# Patient Record
Sex: MALE | Race: WHITE | URBAN - METROPOLITAN AREA
[De-identification: names, ages, dates, MRNs, and addresses within clinical notes are randomized per-mention and may not be internally consistent; named-entity substitution may affect disease eponyms.]

---

## 2017-04-14 ENCOUNTER — TELEPHONE (OUTPATIENT)
Dept: SLEEP MEDICINE | Age: 57
End: 2017-04-14

## 2017-04-14 ENCOUNTER — DOCUMENTATION ONLY (OUTPATIENT)
Dept: SLEEP MEDICINE | Age: 57
End: 2017-04-14

## 2017-04-14 DIAGNOSIS — G47.33 OSA (OBSTRUCTIVE SLEEP APNEA): Primary | ICD-10-CM

## 2017-04-14 NOTE — TELEPHONE ENCOUNTER
Patient called dme advised he will need a repair/replace order to get a part for his cpap, please advise.  Thank you, Lindsay

## 2017-04-17 ENCOUNTER — DOCUMENTATION ONLY (OUTPATIENT)
Dept: SLEEP MEDICINE | Age: 57
End: 2017-04-17

## 2017-05-22 ENCOUNTER — DOCUMENTATION ONLY (OUTPATIENT)
Dept: SLEEP MEDICINE | Age: 57
End: 2017-05-22

## 2017-05-22 DIAGNOSIS — G47.33 OSA (OBSTRUCTIVE SLEEP APNEA): Primary | ICD-10-CM

## 2017-05-23 ENCOUNTER — DOCUMENTATION ONLY (OUTPATIENT)
Dept: SLEEP MEDICINE | Age: 57
End: 2017-05-23

## 2017-06-27 ENCOUNTER — DOCUMENTATION ONLY (OUTPATIENT)
Dept: SLEEP MEDICINE | Age: 57
End: 2017-06-27

## 2017-06-27 DIAGNOSIS — G47.33 OSA (OBSTRUCTIVE SLEEP APNEA): Primary | ICD-10-CM

## 2017-07-28 ENCOUNTER — DOCUMENTATION ONLY (OUTPATIENT)
Dept: SLEEP MEDICINE | Age: 57
End: 2017-07-28

## 2017-07-28 DIAGNOSIS — G47.33 OSA (OBSTRUCTIVE SLEEP APNEA): Primary | ICD-10-CM

## 2017-10-01 ENCOUNTER — HOSPITAL ENCOUNTER (INPATIENT)
Age: 57
LOS: 2 days | Discharge: HOME | DRG: 247 | End: 2017-10-03
Attending: EMERGENCY MEDICINE | Admitting: SPECIALIST
Payer: COMMERCIAL

## 2017-10-01 ENCOUNTER — APPOINTMENT (OUTPATIENT)
Dept: GENERAL RADIOLOGY | Age: 57
DRG: 247 | End: 2017-10-01
Attending: EMERGENCY MEDICINE
Payer: COMMERCIAL

## 2017-10-01 DIAGNOSIS — I21.19 ACUTE MYOCARDIAL INFARCTION OF INFERIOR WALL, INITIAL EPISODE OF CARE (HCC): Primary | ICD-10-CM

## 2017-10-01 PROBLEM — I21.3 STEMI (ST ELEVATION MYOCARDIAL INFARCTION) (HCC): Status: ACTIVE | Noted: 2017-10-01

## 2017-10-01 LAB
ANION GAP BLD CALC-SCNC: 17 MMOL/L (ref 5–15)
APTT PPP: 28 SEC (ref 22.1–32.5)
BASOPHILS # BLD: 0 K/UL (ref 0–0.1)
BASOPHILS NFR BLD: 0 % (ref 0–1)
BUN BLD-MCNC: 17 MG/DL (ref 9–20)
CA-I BLD-MCNC: 1.18 MMOL/L (ref 1.12–1.32)
CHLORIDE BLD-SCNC: 103 MMOL/L (ref 98–107)
CO2 BLD-SCNC: 27 MMOL/L (ref 21–32)
CREAT BLD-MCNC: 1.4 MG/DL (ref 0.6–1.3)
EOSINOPHIL # BLD: 0.3 K/UL (ref 0–0.4)
EOSINOPHIL NFR BLD: 3 % (ref 0–7)
ERYTHROCYTE [DISTWIDTH] IN BLOOD BY AUTOMATED COUNT: 13.1 % (ref 11.5–14.5)
GLUCOSE BLD-MCNC: 118 MG/DL (ref 65–100)
HCT VFR BLD AUTO: 38.2 % (ref 36.6–50.3)
HCT VFR BLD CALC: 38 % (ref 36.6–50.3)
HGB BLD-MCNC: 12.6 G/DL (ref 12.1–17)
HGB BLD-MCNC: 12.9 GM/DL (ref 12.1–17)
INR PPP: 0.9 (ref 0.9–1.1)
LYMPHOCYTES # BLD: 2.1 K/UL (ref 0.8–3.5)
LYMPHOCYTES NFR BLD: 19 % (ref 12–49)
MCH RBC QN AUTO: 29.2 PG (ref 26–34)
MCHC RBC AUTO-ENTMCNC: 33 G/DL (ref 30–36.5)
MCV RBC AUTO: 88.6 FL (ref 80–99)
MONOCYTES # BLD: 1.1 K/UL (ref 0–1)
MONOCYTES NFR BLD: 10 % (ref 5–13)
NEUTS SEG # BLD: 7.3 K/UL (ref 1.8–8)
NEUTS SEG NFR BLD: 68 % (ref 32–75)
PLATELET # BLD AUTO: 227 K/UL (ref 150–400)
POTASSIUM BLD-SCNC: 3.6 MMOL/L (ref 3.5–5.1)
PROTHROMBIN TIME: 9.5 SEC (ref 9–11.1)
RBC # BLD AUTO: 4.31 M/UL (ref 4.1–5.7)
SERVICE CMNT-IMP: ABNORMAL
SODIUM BLD-SCNC: 142 MMOL/L (ref 136–145)
THERAPEUTIC RANGE,PTTT: NORMAL SECS (ref 58–77)
TROPONIN I BLD-MCNC: 6.13 NG/ML (ref 0–0.08)
WBC # BLD AUTO: 10.8 K/UL (ref 4.1–11.1)

## 2017-10-01 PROCEDURE — 74011250636 HC RX REV CODE- 250/636: Performed by: EMERGENCY MEDICINE

## 2017-10-01 PROCEDURE — 85610 PROTHROMBIN TIME: CPT | Performed by: EMERGENCY MEDICINE

## 2017-10-01 PROCEDURE — 65610000006 HC RM INTENSIVE CARE

## 2017-10-01 PROCEDURE — 77030018729 HC ELECTRD DEFIB PAD CARD -B

## 2017-10-01 PROCEDURE — 96375 TX/PRO/DX INJ NEW DRUG ADDON: CPT

## 2017-10-01 PROCEDURE — C1725 CATH, TRANSLUMIN NON-LASER: HCPCS

## 2017-10-01 PROCEDURE — 74011250636 HC RX REV CODE- 250/636: Performed by: SPECIALIST

## 2017-10-01 PROCEDURE — 93454 CORONARY ARTERY ANGIO S&I: CPT

## 2017-10-01 PROCEDURE — 93005 ELECTROCARDIOGRAM TRACING: CPT

## 2017-10-01 PROCEDURE — 85730 THROMBOPLASTIN TIME PARTIAL: CPT | Performed by: EMERGENCY MEDICINE

## 2017-10-01 PROCEDURE — C1894 INTRO/SHEATH, NON-LASER: HCPCS

## 2017-10-01 PROCEDURE — C1769 GUIDE WIRE: HCPCS

## 2017-10-01 PROCEDURE — B2111ZZ FLUOROSCOPY OF MULTIPLE CORONARY ARTERIES USING LOW OSMOLAR CONTRAST: ICD-10-PCS | Performed by: SPECIALIST

## 2017-10-01 PROCEDURE — 77030008543 HC TBNG MON PRSS MRTM -A

## 2017-10-01 PROCEDURE — 96374 THER/PROPH/DIAG INJ IV PUSH: CPT

## 2017-10-01 PROCEDURE — 74011250637 HC RX REV CODE- 250/637: Performed by: SPECIALIST

## 2017-10-01 PROCEDURE — 77030013797 HC KT TRNSDUC PRSSR EDWD -A

## 2017-10-01 PROCEDURE — 80047 BASIC METABLC PNL IONIZED CA: CPT

## 2017-10-01 PROCEDURE — 77030004532 HC CATH ANGI DX IMP BSC -A

## 2017-10-01 PROCEDURE — C1887 CATHETER, GUIDING: HCPCS

## 2017-10-01 PROCEDURE — 84484 ASSAY OF TROPONIN QUANT: CPT

## 2017-10-01 PROCEDURE — 74011636320 HC RX REV CODE- 636/320: Performed by: SPECIALIST

## 2017-10-01 PROCEDURE — 99285 EMERGENCY DEPT VISIT HI MDM: CPT

## 2017-10-01 PROCEDURE — 74011000250 HC RX REV CODE- 250: Performed by: SPECIALIST

## 2017-10-01 PROCEDURE — 96361 HYDRATE IV INFUSION ADD-ON: CPT

## 2017-10-01 PROCEDURE — 94762 N-INVAS EAR/PLS OXIMTRY CONT: CPT

## 2017-10-01 PROCEDURE — 74011250637 HC RX REV CODE- 250/637: Performed by: EMERGENCY MEDICINE

## 2017-10-01 PROCEDURE — 85025 COMPLETE CBC W/AUTO DIFF WBC: CPT | Performed by: EMERGENCY MEDICINE

## 2017-10-01 PROCEDURE — 77030000299 HC FEMSTP COMP GLD STJU -B

## 2017-10-01 PROCEDURE — 36415 COLL VENOUS BLD VENIPUNCTURE: CPT | Performed by: EMERGENCY MEDICINE

## 2017-10-01 PROCEDURE — 74011000258 HC RX REV CODE- 258: Performed by: SPECIALIST

## 2017-10-01 PROCEDURE — 74011000250 HC RX REV CODE- 250: Performed by: EMERGENCY MEDICINE

## 2017-10-01 PROCEDURE — 74011250637 HC RX REV CODE- 250/637: Performed by: INTERNAL MEDICINE

## 2017-10-01 PROCEDURE — 027035Z DILATION OF CORONARY ARTERY, ONE ARTERY WITH TWO DRUG-ELUTING INTRALUMINAL DEVICES, PERCUTANEOUS APPROACH: ICD-10-PCS | Performed by: SPECIALIST

## 2017-10-01 PROCEDURE — C1874 STENT, COATED/COV W/DEL SYS: HCPCS

## 2017-10-01 RX ORDER — GUAIFENESIN 100 MG/5ML
324 LIQUID (ML) ORAL
Status: COMPLETED | OUTPATIENT
Start: 2017-10-01 | End: 2017-10-01

## 2017-10-01 RX ORDER — SODIUM CHLORIDE 0.9 % (FLUSH) 0.9 %
5-10 SYRINGE (ML) INJECTION AS NEEDED
Status: DISCONTINUED | OUTPATIENT
Start: 2017-10-01 | End: 2017-10-03 | Stop reason: HOSPADM

## 2017-10-01 RX ORDER — HEPARIN SODIUM 5000 [USP'U]/ML
5000 INJECTION, SOLUTION INTRAVENOUS; SUBCUTANEOUS
Status: COMPLETED | OUTPATIENT
Start: 2017-10-01 | End: 2017-10-01

## 2017-10-01 RX ORDER — GUAIFENESIN 100 MG/5ML
81 LIQUID (ML) ORAL DAILY
Status: DISCONTINUED | OUTPATIENT
Start: 2017-10-02 | End: 2017-10-01 | Stop reason: HOSPADM

## 2017-10-01 RX ORDER — ATROPINE SULFATE 0.1 MG/ML
.4-1 INJECTION INTRAVENOUS
Status: DISCONTINUED | OUTPATIENT
Start: 2017-10-01 | End: 2017-10-03 | Stop reason: HOSPADM

## 2017-10-01 RX ORDER — CLOPIDOGREL BISULFATE 75 MG/1
600 TABLET ORAL ONCE
Status: COMPLETED | OUTPATIENT
Start: 2017-10-01 | End: 2017-10-01

## 2017-10-01 RX ORDER — ATORVASTATIN CALCIUM 20 MG/1
80 TABLET, FILM COATED ORAL
Status: DISCONTINUED | OUTPATIENT
Start: 2017-10-01 | End: 2017-10-03 | Stop reason: HOSPADM

## 2017-10-01 RX ORDER — FENTANYL CITRATE 50 UG/ML
25-200 INJECTION, SOLUTION INTRAMUSCULAR; INTRAVENOUS
Status: DISCONTINUED | OUTPATIENT
Start: 2017-10-01 | End: 2017-10-01 | Stop reason: HOSPADM

## 2017-10-01 RX ORDER — ASPIRIN 325 MG
325 TABLET ORAL
Status: DISCONTINUED | OUTPATIENT
Start: 2017-10-01 | End: 2017-10-01

## 2017-10-01 RX ORDER — CLOPIDOGREL BISULFATE 75 MG/1
75 TABLET ORAL DAILY
Status: DISCONTINUED | OUTPATIENT
Start: 2017-10-02 | End: 2017-10-03 | Stop reason: HOSPADM

## 2017-10-01 RX ORDER — LIDOCAINE HYDROCHLORIDE 10 MG/ML
1-20 INJECTION INFILTRATION; PERINEURAL
Status: DISCONTINUED | OUTPATIENT
Start: 2017-10-01 | End: 2017-10-01 | Stop reason: HOSPADM

## 2017-10-01 RX ORDER — ATORVASTATIN CALCIUM 20 MG/1
20 TABLET, FILM COATED ORAL
Status: DISCONTINUED | OUTPATIENT
Start: 2017-10-01 | End: 2017-10-01

## 2017-10-01 RX ORDER — SODIUM CHLORIDE 0.9 % (FLUSH) 0.9 %
5-10 SYRINGE (ML) INJECTION EVERY 8 HOURS
Status: DISCONTINUED | OUTPATIENT
Start: 2017-10-01 | End: 2017-10-03 | Stop reason: HOSPADM

## 2017-10-01 RX ORDER — MIDAZOLAM HYDROCHLORIDE 1 MG/ML
.5-1 INJECTION, SOLUTION INTRAMUSCULAR; INTRAVENOUS
Status: DISCONTINUED | OUTPATIENT
Start: 2017-10-01 | End: 2017-10-01 | Stop reason: HOSPADM

## 2017-10-01 RX ORDER — METOPROLOL TARTRATE 5 MG/5ML
5 INJECTION INTRAVENOUS
Status: COMPLETED | OUTPATIENT
Start: 2017-10-01 | End: 2017-10-01

## 2017-10-01 RX ORDER — GUAIFENESIN 100 MG/5ML
81 LIQUID (ML) ORAL DAILY
Status: DISCONTINUED | OUTPATIENT
Start: 2017-10-02 | End: 2017-10-03 | Stop reason: HOSPADM

## 2017-10-01 RX ORDER — HEPARIN SODIUM 200 [USP'U]/100ML
500 INJECTION, SOLUTION INTRAVENOUS
Status: DISCONTINUED | OUTPATIENT
Start: 2017-10-01 | End: 2017-10-01 | Stop reason: HOSPADM

## 2017-10-01 RX ORDER — METOPROLOL TARTRATE 25 MG/1
25 TABLET, FILM COATED ORAL EVERY 6 HOURS
Status: DISCONTINUED | OUTPATIENT
Start: 2017-10-02 | End: 2017-10-02

## 2017-10-01 RX ORDER — NITROGLYCERIN 0.4 MG/1
0.4 TABLET SUBLINGUAL
Status: DISPENSED | OUTPATIENT
Start: 2017-10-01 | End: 2017-10-01

## 2017-10-01 RX ORDER — ATORVASTATIN CALCIUM 20 MG/1
80 TABLET, FILM COATED ORAL
Status: DISCONTINUED | OUTPATIENT
Start: 2017-10-02 | End: 2017-10-01

## 2017-10-01 RX ADMIN — ATORVASTATIN CALCIUM 80 MG: 20 TABLET, FILM COATED ORAL at 22:39

## 2017-10-01 RX ADMIN — MIDAZOLAM HYDROCHLORIDE 3 MG: 1 INJECTION, SOLUTION INTRAMUSCULAR; INTRAVENOUS at 19:50

## 2017-10-01 RX ADMIN — HEPARIN SODIUM IN SODIUM CHLORIDE 1000 UNITS: 200 INJECTION INTRAVENOUS at 19:50

## 2017-10-01 RX ADMIN — FENTANYL CITRATE 25 MCG: 50 INJECTION, SOLUTION INTRAMUSCULAR; INTRAVENOUS at 20:38

## 2017-10-01 RX ADMIN — IOPAMIDOL 50 ML: 755 INJECTION, SOLUTION INTRAVENOUS at 20:10

## 2017-10-01 RX ADMIN — CLOPIDOGREL 600 MG: 75 TABLET, FILM COATED ORAL at 20:38

## 2017-10-01 RX ADMIN — METOPROLOL TARTRATE 5 MG: 5 INJECTION INTRAVENOUS at 19:06

## 2017-10-01 RX ADMIN — LIDOCAINE HYDROCHLORIDE 20 ML: 10 INJECTION, SOLUTION INFILTRATION; PERINEURAL at 19:49

## 2017-10-01 RX ADMIN — NITROGLYCERIN 0.4 MG: 0.4 TABLET SUBLINGUAL at 18:58

## 2017-10-01 RX ADMIN — BIVALIRUDIN 1.75 MG/KG/HR: 250 INJECTION, POWDER, LYOPHILIZED, FOR SOLUTION INTRAVENOUS at 20:30

## 2017-10-01 RX ADMIN — MIDAZOLAM HYDROCHLORIDE 1 MG: 1 INJECTION, SOLUTION INTRAMUSCULAR; INTRAVENOUS at 20:12

## 2017-10-01 RX ADMIN — SODIUM CHLORIDE 1000 ML: 900 INJECTION, SOLUTION INTRAVENOUS at 18:55

## 2017-10-01 RX ADMIN — MIDAZOLAM HYDROCHLORIDE 1 MG: 1 INJECTION, SOLUTION INTRAMUSCULAR; INTRAVENOUS at 20:38

## 2017-10-01 RX ADMIN — ASPIRIN 81 MG CHEWABLE TABLET 324 MG: 81 TABLET CHEWABLE at 19:05

## 2017-10-01 RX ADMIN — FENTANYL CITRATE 50 MCG: 50 INJECTION, SOLUTION INTRAMUSCULAR; INTRAVENOUS at 19:50

## 2017-10-01 RX ADMIN — HEPARIN SODIUM 5000 UNITS: 5000 INJECTION, SOLUTION INTRAVENOUS; SUBCUTANEOUS at 19:12

## 2017-10-01 RX ADMIN — FENTANYL CITRATE 50 MCG: 50 INJECTION, SOLUTION INTRAMUSCULAR; INTRAVENOUS at 19:53

## 2017-10-01 RX ADMIN — MIDAZOLAM HYDROCHLORIDE 1 MG: 1 INJECTION, SOLUTION INTRAMUSCULAR; INTRAVENOUS at 19:59

## 2017-10-01 RX ADMIN — BIVALIRUDIN 1.75 MG/KG/HR: 250 INJECTION, POWDER, LYOPHILIZED, FOR SOLUTION INTRAVENOUS at 20:07

## 2017-10-01 RX ADMIN — IOPAMIDOL 220 ML: 755 INJECTION, SOLUTION INTRAVENOUS at 20:47

## 2017-10-01 RX ADMIN — MIDAZOLAM HYDROCHLORIDE 1 MG: 1 INJECTION, SOLUTION INTRAMUSCULAR; INTRAVENOUS at 19:53

## 2017-10-01 NOTE — H&P
Date of  Admission: 10/1/2017  6:50 PM     Glenys Scott is a 65 y.o. male admitted for STEMI (ST elevation myocardial infarction) ([facility]  Subjective: pt with stuttering chest and epigastric pain for couple days, worse this afternoon while walking. Came to er where ekg shows acute inferior mi. Untreated htn. No known cardiac disease    denies dyspnea, palpitations, syncope, orthopnea, paroxysmal nocturnal dyspnea, exertional chest pressure/discomfort, claudication, lower extremity edema. Cardiac risk factors: male gender, hypertension. Assessment/Plan: currently hemodynamically stable. Has received iv heparin and aspirin, iv metoprolol. Recommend urgent cath and pci if necessary. Procedure, risks, benefits, alternatives and potential outcomes explained to patient and questions answered. No contraindication to dapt    Patient Active Problem List    Diagnosis Date Noted    STEMI (ST elevation myocardial infarction) ([facility] 10/01/2017      Mack Kidd MD  Past Medical History:   Diagnosis Date    HTN (hypertension)     Sleep apnea       History reviewed. No pertinent surgical history.   Allergies   Allergen Reactions    Ampicillin Other (comments)     \"spots throughout body\"        Family History   Problem Relation Age of Onset    Heart Disease Father      cabg in his 66's      Current Facility-Administered Medications   Medication Dose Route Frequency    sodium chloride (NS) flush 5-10 mL  5-10 mL IntraVENous PRN    sodium chloride 0.9 % bolus infusion 1,000 mL  1,000 mL IntraVENous CONTINUOUS    lidocaine (XYLOCAINE) 10 mg/mL (1 %) injection 1-20 mL  1-20 mL SubCUTAneous Multiple    fentaNYL citrate (PF) injection  mcg   mcg IntraVENous Multiple    nitroglycerin 100 mcg/ml compounded injection  1-10 mL IntraCORONary Multiple    atropine injection 0.4-1 mg  0.4-1 mg IntraVENous Multiple    iopamidol (ISOVUE-370) 76 % injection 100-200 mL  100-200 mL IntraarTERial Multiple    iopamidol (ISOVUE-370) 76 % injection 50 mL  50 mL Other RAD ONCE    bivalirudin (ANGIOMAX) BOLUS 64.5 mg  0.75 mg/kg IntraVENous ONCE    Followed by   Micha Marlyn bivalirudin (ANGIOMAX) 250 mg in 0.9% sodium chloride (MBP/ADV) 50 mL  1.75 mg/kg/hr IntraVENous CONTINUOUS    midazolam (VERSED) injection 0.5-10 mg  0.5-10 mg IntraVENous Multiple    heparinized saline 2 units/mL infusion 1,000 Units  500 mL IntraSHEAth Multiple    heparinized saline 2 units/mL infusion 1,000 Units  500 mL IntraarTERial Multiple         Review of Symptoms:  A comprehensive review of systems was negative except for that written in the HPI. Physical Exam    Visit Vitals    /89    Pulse 93    Temp 99.4 °F (37.4 °C)    Resp 13    Ht 5' 10\" (1.778 m)    Wt 190 lb (86.2 kg)    SpO2 98%    BMI 27.26 kg/m2     Neck: supple, symmetrical, trachea midline, no adenopathy, no carotid bruit and no JVD  Heart: regular rate and rhythm, S1, S2 normal, no murmur, click, rub or gallop  Lungs: clear to auscultation bilaterally  Abdomen: soft, non-tender.  Bowel sounds normal. No masses,  no organomegaly  Extremities: extremities normal, atraumatic, no cyanosis or edema  Pulses: 2+ and symmetric  Neurologic: Grossly normal    Cardiographics    Telemetry: normal sinus rhythm  ECG: normal sinus rhythm, acute inf mi  Echocardiogram: Not done    Recent radiology, intake/output and wt reviewed    Labs:   Recent Results (from the past 24 hour(s))   EKG, 12 LEAD, INITIAL    Collection Time: 10/01/17  6:45 PM   Result Value Ref Range    Ventricular Rate 96 BPM    Atrial Rate 96 BPM    P-R Interval 150 ms    QRS Duration 96 ms    Q-T Interval 358 ms    QTC Calculation (Bezet) 452 ms    Calculated P Axis 62 degrees    Calculated R Axis 27 degrees    Calculated T Axis 94 degrees    Diagnosis       Normal sinus rhythm  Inferior infarct , possibly acute  Marked ST abnormality, possible anterolateral subendocardial injury  ** ** ACUTE MI / STEMI ** **  Consider right ventricular involvement in acute inferior infarct  Abnormal ECG  No previous ECGs available     POC TROPONIN-I    Collection Time: 10/01/17  6:58 PM   Result Value Ref Range    Troponin-I (POC) 6.13 (H) 0.00 - 0.08 ng/mL   CBC WITH AUTOMATED DIFF    Collection Time: 10/01/17  6:59 PM   Result Value Ref Range    WBC 10.8 4.1 - 11.1 K/uL    RBC 4.31 4.10 - 5.70 M/uL    HGB 12.6 12.1 - 17.0 g/dL    HCT 38.2 36.6 - 50.3 %    MCV 88.6 80.0 - 99.0 FL    MCH 29.2 26.0 - 34.0 PG    MCHC 33.0 30.0 - 36.5 g/dL    RDW 13.1 11.5 - 14.5 %    PLATELET 079 015 - 507 K/uL    NEUTROPHILS 68 32 - 75 %    LYMPHOCYTES 19 12 - 49 %    MONOCYTES 10 5 - 13 %    EOSINOPHILS 3 0 - 7 %    BASOPHILS 0 0 - 1 %    ABS. NEUTROPHILS 7.3 1.8 - 8.0 K/UL    ABS. LYMPHOCYTES 2.1 0.8 - 3.5 K/UL    ABS. MONOCYTES 1.1 (H) 0.0 - 1.0 K/UL    ABS. EOSINOPHILS 0.3 0.0 - 0.4 K/UL    ABS. BASOPHILS 0.0 0.0 - 0.1 K/UL   PTT    Collection Time: 10/01/17  6:59 PM   Result Value Ref Range    aPTT 28.0 22.1 - 32.5 sec    aPTT, therapeutic range     58.0 - 77.0 SECS   PROTHROMBIN TIME + INR    Collection Time: 10/01/17  6:59 PM   Result Value Ref Range    INR 0.9 0.9 - 1.1      Prothrombin time 9.5 9.0 - 11.1 sec   POC CHEM8    Collection Time: 10/01/17  6:59 PM   Result Value Ref Range    Calcium, ionized (POC) 1.18 1.12 - 1.32 MMOL/L    Sodium (POC) 142 136 - 145 MMOL/L    Potassium (POC) 3.6 3.5 - 5.1 MMOL/L    Chloride (POC) 103 98 - 107 MMOL/L    CO2 (POC) 27 21 - 32 MMOL/L    Anion gap (POC) 17 (H) 5 - 15 mmol/L    Glucose (POC) 118 (H) 65 - 100 MG/DL    BUN (POC) 17 9 - 20 MG/DL    Creatinine (POC) 1.4 (H) 0.6 - 1.3 MG/DL    GFRAA, POC >60 >60 ml/min/1.73m2    GFRNA, POC 52 (L) >60 ml/min/1.73m2    Hemoglobin (POC) 12.9 12.1 - 17.0 GM/DL    Hematocrit (POC) 38 36.6 - 50.3 %    Comment Comment Not Indicated.

## 2017-10-01 NOTE — IP AVS SNAPSHOT
Ruchi Ho 
 
 
 [address] [facility] 
[phone] Patient: Aleksandra Rodriguez MRN: [id number] :1960 You are allergic to the following Allergen Reactions Ampicillin Other (comments) \"spots throughout body\" Recent Documentation Height Weight BMI Smoking Status 1.778 m 87.5 kg 27.68 kg/m2 Light Tobacco Smoker Emergency Contacts Name Discharge Info Relation Home Work Mobile Abby Miller  Spouse [3] [phone] About your hospitalization You were admitted on:  2017 You last received care in the:  [facility] 3 PROG CARE TELE 2 You were discharged on:  October 3, 2017 Unit phone number:  [phone] Why you were hospitalized Your primary diagnosis was:  Not on File Your diagnoses also included:  Stemi (St Elevation Myocardial Infarction) (Hcc), Coronary Artery Disease Involving Native Coronary Artery Providers Seen During Your Hospitalizations Provider Role Specialty Primary office phone Marlo Echeverria MD Attending Provider Emergency Medicine [phone] Ruben Contreras MD Attending Provider Cardiology [phone] Lili Canales MD Attending Provider Cardiology [phone] Your Primary Care Physician (PCP) Primary Care Physician Office Phone Office Fax Stefanie Galdamez [phone] Follow-up Information Follow up With Details Comments Contact Info Lili Canales MD On 10/6/2017 2 pm  [address] [place] [address] [facility] 
[phone] Lisa Marques MD   [address] [place] [address] [address] [place] [address] 
[phone] Your Appointments 2017  2:00 PM EDT  
ESTABLISHED PATIENT with Lili Canales MD  
[facility] ([facility]) [address] [place] [address] [facility]  
[phone] Current Discharge Medication List  
  
START taking these medications Dose & Instructions Dispensing Information Comments Morning Noon Evening Bedtime  
 aspirin 81 mg chewable tablet Your last dose was: Your next dose is:    
   
   
 Dose:  81 mg Take 1 Tab by mouth daily. Refills:  0  
     
   
   
   
  
 atorvastatin 80 mg tablet Commonly known as:  LIPITOR Your last dose was: Your next dose is:    
   
   
 Dose:  80 mg Take 1 Tab by mouth nightly. Quantity:  30 Tab Refills:  1  
     
   
   
   
  
 clopidogrel 75 mg Tab Commonly known as:  PLAVIX Your last dose was: Your next dose is:    
   
   
 Dose:  75 mg Take 1 Tab by mouth daily. Quantity:  30 Tab Refills:  11  
     
   
   
   
  
 isosorbide mononitrate ER 30 mg tablet Commonly known as:  IMDUR Your last dose was: Your next dose is:    
   
   
 Dose:  30 mg Take 1 Tab by mouth daily. Quantity:  30 Tab Refills:  3  
     
   
   
   
  
 lisinopril 5 mg tablet Commonly known as:  Ancelmo Lights Your last dose was: Your next dose is:    
   
   
 Dose:  5 mg Take 1 Tab by mouth daily. Quantity:  30 Tab Refills:  3  
     
   
   
   
  
 metoprolol tartrate 25 mg tablet Commonly known as:  LOPRESSOR Your last dose was: Your next dose is:    
   
   
 Dose:  12.5 mg Take 0.5 Tabs by mouth two (2) times a day. Quantity:  60 Tab Refills:  3 Where to Get Your Medications These medications were sent to [address]  [address], [place] [address] Phone:  [phone]  
  atorvastatin 80 mg tablet  
 clopidogrel 75 mg Tab  
 isosorbide mononitrate ER 30 mg tablet  
 lisinopril 5 mg tablet  
 metoprolol tartrate 25 mg tablet Discharge Instructions [facility] Office: [address] [facility] [place], [address] [facility] [address] [facility] [facility] 
    [phone] [facility] office: [facility]  
                           [phone] Patient Discharge Instructions Balod Hinson / [id number] : 1960 Admitted 10/1/2017 Discharged: 10/3/2017 Cardiologist:  Dr Henok Day     PCP:  Satish Faulkner MD 
 
Diagnosis:  
Patient Active Problem List  
Diagnosis Code  STEMI (ST elevation myocardial infarction) (Former[facility]) I21.3  Coronary artery disease involving native coronary artery I25.10 Procedures/Test: ECHO: Pump function (LVEF) normal 55-60% CATH: Drug stent placed in RCA · It is important that you take the medication exactly as they are prescribed. · Keep your medication in the bottles provided by the pharmacist and keep a list of the medication names, dosages, and times to be taken in your wallet. · Do not take other medications without consulting your doctor. BRING ALL of YOUR MEDICINES TO YOUR OFFICE VISIT with Dr. Tatianna Johnson 
 
Cardiac Catheterization  Discharge Instructions *Check the puncture site frequently for swelling or bleeding. If you see any bleeding, lie down and apply pressure over the area with a clean town or washcloth. Notify your doctor for any redness, swelling, drainage or oozing from the puncture site. Notify your doctor for any fever or chills. *If the leg or arm with the puncture becomes cold, numb or painful, call Dr Tatianna Johnson [phone] *Activity should be limited for the next 48 hours. Climb stairs as little as possible and avoid any stooping, bending or strenuous activity for 48 hours. No strenuous activity or heavy lifting over 10 lbs. for 7 days. Do not return to work until you see Dr Tatianna Johnson in the office. · You may take a shower 24 hours after your cardiac catheterization.   Be sure to get the dressing wet and then remove it; gently wash the area with warm soapy water. Pat dry and leave open to air. To help prevent infections, be sure to keep the cath site clean and dry. No lotions, creams, powders, ointments, etc. in the cath site for approximately 1 week. · Do not take a tub bath, get in a hot tub or swimming pool for approximately 5 days or until the cath site is completely healed. · Drink plenty of fluids for 24-48 hours after your cath to flush the contrast dye from your kidneys. No alcoholic beverages for 24 hours. You may resume your previous diet (low fat, low cholesterol) after your cath. · Do not drive or operate heavy machinery for 48 hours. · You may resume your usual diet. Drink more fluids than usual. 
· Have a responsible person drive you home and stay with you for at least 24 hours after your heart catheterization/angiography. · *After your cath, some bruising or discomfort is common during the healing process. Tylenol, 1-2 tablets every 6 hours as needed, is recommended if you experience any discomfort. If you experience any signs or symptoms of infection such as fever, chills, or poorly healing incision, persistent tenderness or swelling in the groin, redness and/or warmth to the touch, numbness, significant tingling or pain at the groin site or affected extremity, rash, drainage from the cath site, or if the leg feels tight or swollen, call your physician right away. ? If bleeding at the cath site occurs, take a clean gauze pad and apply direct pressure to the groin just above the puncture site. Call 911 immediately, and continue to apply direct pressure until an ambulance gets to your location. Diet: American Heart association, low fat, 1500 mg sodium. Lifestyle changes Do not smoke: go to : PrimeLetters.ca. aspx for text reminders Consider a Vegan diet- read Reversing and Preventing Heart Disease by Dr. J Carlos Saucedo. Watch [place] over NIKE a heart-healthy diet that is low in cholesterol, saturated fat, and salt, and is full of fruits, vegetables and whole-grains. Eat at least two servings of fish each week. You may get more details about how to eat healthy, but these tips can help you get started. Www.aha.com When should you call for help? Call 911 anytime you think you may need emergency care. For example, call if: 
You have signs of a heart attack. These may include: 
Chest pain or pressure. Sweating. Shortness of breath. Nausea or vomiting. Pain that spreads from the chest to the neck, jaw, or one or both shoulders or arms. Dizziness or lightheadedness. A fast or irregular pulse. After calling 911, chew 1 adult-strength aspirin. Wait for an ambulance. Do not try to drive yourself. You passed out (lost consciousness). You feel like you are having another heart attack. Call your doctor now or seek immediate medical care if: 
You have had any chest pain, even if it has gone away. You have new or increased shortness of breath. You are dizzy or lightheaded, or you feel like you may faint. Watch closely for changes in your health, and be sure to contact your doctor if you have any problem Information obtained by : 
I understand that if any problems occur once I am at home I am to contact my physician. I understand and acknowledge receipt of the instructions indicated above. R.N.'s Signature                                                                  Date/Time Patient or Representative Signature Date/Time Discharge Instructions Attachments/References ASPIRIN: HEART ATTACK AND STROKE PREVENTION (ENGLISH) MEFS - CLOPIDOGREL (PLAVIX) - (BY MOUTH) (ENGLISH) MEFS - ATORVASTATIN (LIPITOR) - (BY MOUTH) (ENGLISH) MEFS - METOPROLOL (LOPRESSOR, TOPROL XL) - (BY MOUTH) (ENGLISH) MEFS - ISOSORBIDE MONONITRATE (IMDUR, IMDUR ER, ISMO) - (BY MOUTH) (ENGLISH) MEFS - LISINOPRIL (PRINIVIL, ZESTRIL) - (BY MOUTH) (ENGLISH) Discharge Orders None [facility] Announcement We are excited to announce that we are making your provider's discharge notes available to you in Lover.ly. You will see these notes when they are completed and signed by the physician that discharged you from your recent hospital stay. If you have any questions or concerns about any information you see in Lover.ly, please call the Health Information Department where you were seen or reach out to your Primary Care Provider for more information about your plan of care. Introducing [facility]! [facility] introduces Lover.ly patient portal. Now you can access parts of your medical record, email your doctor's office, and request medication refills online. 1. In your internet browser, go to [email or web address] 2. Click on the First Time User? Click Here link in the Sign In box. You will see the New Member Sign Up page. 3. Enter your Lover.ly Access Code exactly as it appears below. You will not need to use this code after youve completed the sign-up process. If you do not sign up before the expiration date, you must request a new code. · Lover.ly Access Code: [email or web address] Expires: 12/31/2017  2:10 PM 
 
4. Enter the last four digits of your Social Security Number (xxxx) and Date of Birth (mm/dd/yyyy) as indicated and click Submit. You will be taken to the next sign-up page. 5. Create a Lover.ly ID.  This will be your Lover.ly login ID and cannot be changed, so think of one that is secure and easy to remember. 6. Create a Merge Social password. You can change your password at any time. 7. Enter your Password Reset Question and Answer. This can be used at a later time if you forget your password. 8. Enter your e-mail address. You will receive e-mail notification when new information is available in [address] 9. Click Sign Up. You can now view and download portions of your medical record. 10. Click the Download Summary menu link to download a portable copy of your medical information. If you have questions, please visit the Frequently Asked Questions section of the Merge Social website. Remember, Merge Social is NOT to be used for urgent needs. For medical emergencies, dial 911. Now available from your iPhone and Android! General Information Please provide this summary of care documentation to your next provider. Patient Signature:  ____________________________________________________________ Date:  ____________________________________________________________  
  
Rashida Tyler Provider Signature:  ____________________________________________________________ Date:  ____________________________________________________________ More Information Taking Aspirin to Prevent Heart Attack and Stroke: Care Instructions Your Care Instructions Aspirin acts as a \"blood thinner. \" It prevents blood clots from forming. When taken during and after a heart attack, it can reduce your chance of dying. And it's used if you have a stent in your coronary artery. Also, aspirin helps certain people lower their risk of a heart attack or stroke. Be sure you know what dose of aspirin to take and how often to take it. Low-dose aspirin is typically 81 mg. But the dose for daily aspirin can range from 81 mg to 325 mg. Taking aspirin every day can cause bleeding.  It may not be safe if you have stomach ulcers. And it may not be safe if you have high blood pressure that is not controlled. If you take aspirin pills every day, do not take ones that have other ingredients such as caffeine or sodium. Before you start to take aspirin, tell your doctor all the medicines, vitamins, herbal products, and supplements you take. Follow-up care is a key part of your treatment and safety. Be sure to make and go to all appointments, and call your doctor if you are having problems. It's also a good idea to know your test results and keep a list of the medicines you take. How can you care for yourself at home? · Take aspirin with a full glass of water unless your doctor tells you not to. Do not lie down right after you take it. · If you have a stent in your coronary artery, take your aspirin as your heart doctor says to. If another doctor says to stop taking the aspirin for any reason, talk to your heart doctor before you stop. · Do not chew or crush the coated or sustained-release forms of aspirin. · Ask your doctor if you can drink alcohol while you take aspirin. And ask how much you can drink. Too much alcohol with aspirin can cause stomach bleeding. · Do not take aspirin if you are pregnant, unless your doctor says it is okay. · Keep all aspirin out of children's reach. · Throw aspirin away if it starts to smell like vinegar. · Do not take aspirin if you have gout or if you take prescription blood thinners, unless your doctor has told you to. · Do not take prescription or over-the-counter medicines, vitamins, herbal products, or supplements without talking to your doctor first. Mariya Salts the label before you take another over-the-counter medicine. Many contain aspirin. So they could cause you to take too much aspirin. · Talk with your doctor before you take a pain medicine. Ask which type of medicine you can take and how to take it safely with aspirin. · Tell your doctor or dentist before a surgery or procedure that you take aspirin. He or she will tell you if you should stop taking aspirin before your surgery or procedure. Make sure that you understand exactly what your doctor wants you to do. Where can you learn more? Go to http://neema-trena.info/. Enter H363 in the search box to learn more about \"Taking Aspirin to Prevent Heart Attack and Stroke: Care Instructions. \" Current as of: April 3, 2017 Content Version: 11.3 © 1996-5137 [employer]. Care instructions adapted under license by marshallindex (which disclaims liability or warranty for this information). If you have questions about a medical condition or this instruction, always ask your healthcare professional. [address] any warranty or liability for your use of this information. Clopidogrel (Plavix) - (By mouth) Why this medicine is used:  
Helps prevent stroke, heart attack, and other heart problems. Contact a nurse or doctor right away if you have: 
· Sudden or severe headache · Bloody vomit or vomit that looks like coffee grounds; bloody or black, tarry stools · Bleeding that does not stop or bruises that do not heal 
· Dark urine or pale stools, nausea, loss of appetite, stomach pain, · Yellow skin or eyes Common side effects: · Minor bleeding or bruising © 2017 [address] Information is for End User's use only and may not be sold, redistributed or otherwise used for commercial purposes. Atorvastatin (Lipitor) - (By mouth) Why this medicine is used:  
Treats high cholesterol and triglyceride levels. Reduces the risk of angina, stroke, heart attack, or certain heart and blood vessel problems. Contact a nurse or doctor right away if you have: · Severe headache, confusion, trouble speaking · Dark urine or pale stools · Yellow skin or eyes · Nausea, vomiting, loss of appetite, stomach pain · Muscle pain, tenderness, or weakness; unusual tiredness Common side effects: 
· Diarrhea · Joint pain © 2017 [address] Information is for End User's use only and may not be sold, redistributed or otherwise used for commercial purposes. Metoprolol (Lopressor, Toprol XL) - (By mouth) Why this medicine is used:  
Treats high blood pressure, chest pain, and heart failure. Contact a nurse or doctor right away if you have: · Lightheadedness, dizziness, fainting · Rapid weight gain; swelling in your hands, ankles, or feet Common side effects: · Mild dizziness · Tiredness © 2017 [address] Information is for End User's use only and may not be sold, redistributed or otherwise used for commercial purposes. Isosorbide Mononitrate (Imdur, Imdur ER, Ismo) - (By mouth) Why this medicine is used:  
Prevents angina. Contact a nurse or doctor right away if you have: · Severe or ongoing dizziness, lightheadedness, fainting · Slow heartbeat, new or increased chest pain Common side effects: · Mild dizziness, lightheadedness · Headache · Feeling of warmth, redness of the face, neck, arms, and upper chest 
© 2017 [address] Information is for End User's use only and may not be sold, redistributed or otherwise used for commercial purposes. Lisinopril (Prinivil, Zestril) - (By mouth) Why this medicine is used:  
Treats high blood pressure and heart failure. Contact a nurse or doctor right away if you have: · Blistering, peeling, red skin rash · Change in how much or how often you urinate · Confusion, weakness, uneven heartbeat, trouble breathing · Lightheadedness, dizziness, fainting · Numbness or tingling in your hands, feet, or lips Common side effects: · Dry cough © 2017 [address] Information is for End User's use only and may not be sold, redistributed or otherwise used for commercial purposes.

## 2017-10-01 NOTE — IP AVS SNAPSHOT
[address] [facility] 
 
 
 [address] 
[phone] Patient: Shawna Engle MRN: [id number] :1960 Current Discharge Medication List  
  
START taking these medications Dose & Instructions Dispensing Information Comments Morning Noon Evening Bedtime  
 aspirin 81 mg chewable tablet Your last dose was: Your next dose is:    
   
   
 Dose:  81 mg Take 1 Tab by mouth daily. Refills:  0  
     
   
   
   
  
 atorvastatin 80 mg tablet Commonly known as:  LIPITOR Your last dose was: Your next dose is:    
   
   
 Dose:  80 mg Take 1 Tab by mouth nightly. Quantity:  30 Tab Refills:  1  
     
   
   
   
  
 clopidogrel 75 mg Tab Commonly known as:  PLAVIX Your last dose was: Your next dose is:    
   
   
 Dose:  75 mg Take 1 Tab by mouth daily. Quantity:  30 Tab Refills:  11  
     
   
   
   
  
 isosorbide mononitrate ER 30 mg tablet Commonly known as:  IMDUR Your last dose was: Your next dose is:    
   
   
 Dose:  30 mg Take 1 Tab by mouth daily. Quantity:  30 Tab Refills:  3  
     
   
   
   
  
 lisinopril 5 mg tablet Commonly known as:  Margaret Cho Your last dose was: Your next dose is:    
   
   
 Dose:  5 mg Take 1 Tab by mouth daily. Quantity:  30 Tab Refills:  3  
     
   
   
   
  
 metoprolol tartrate 25 mg tablet Commonly known as:  LOPRESSOR Your last dose was: Your next dose is:    
   
   
 Dose:  12.5 mg Take 0.5 Tabs by mouth two (2) times a day. Quantity:  60 Tab Refills:  3 Where to Get Your Medications These medications were sent to [address], [place]  [address], [place] [address] Phone:  [phone]  
  atorvastatin 80 mg tablet  
 clopidogrel 75 mg Tab isosorbide mononitrate ER 30 mg tablet  
 lisinopril 5 mg tablet  
 metoprolol tartrate 25 mg tablet

## 2017-10-01 NOTE — ED PROVIDER NOTES
HPI Comments: Patient is a very pleasant 44-year-old white male who presents to the emergency department with a history of stuttering chest pain for the past 3 days. He states that he developed intermittent chest heaviness that is nonradiating and accompanied with mild shortness of breath. He denies nausea, diaphoresis, lightheadedness, syncope. He has no prior history of cardiac disease. He has hypertension which is controlled with diet and exercise. He is a nonsmoker. Today patient went for a walk and developed chest pain again. He presents with roughly 3 hours of chest pain which is currently 3/10 in severity. The history is provided by the patient. Past Medical History:   Diagnosis Date    HTN (hypertension)     Sleep apnea        History reviewed. No pertinent surgical history. History reviewed. No pertinent family history. Social History     Social History    Marital status:      Spouse name: N/A    Number of children: N/A    Years of education: N/A     Occupational History    Not on file. Social History Main Topics    Smoking status: Never Smoker    Smokeless tobacco: Not on file    Alcohol use No    Drug use: Not on file    Sexual activity: Not on file     Other Topics Concern    Not on file     Social History Narrative         ALLERGIES: Ampicillin    Review of Systems   Constitutional: Negative. Negative for appetite change, fever and unexpected weight change. HENT: Negative. Negative for ear pain, hearing loss, nosebleeds, rhinorrhea, sore throat and trouble swallowing. Respiratory: Positive for shortness of breath. Negative for cough and chest tightness. Cardiovascular: Positive for chest pain. Negative for palpitations. Gastrointestinal: Negative. Negative for abdominal distention, abdominal pain, blood in stool and vomiting. Endocrine: Negative. Genitourinary: Negative for dysuria and hematuria. Musculoskeletal: Negative.   Negative for back pain and myalgias. Skin: Negative. Negative for rash. Allergic/Immunologic: Negative. Neurological: Negative. Negative for dizziness, syncope, weakness and numbness. Hematological: Negative. Psychiatric/Behavioral: Negative. All other systems reviewed and are negative. Vitals:    10/01/17 1911 10/01/17 1912 10/01/17 1915 10/01/17 1918   BP: (!) 162/92  (!) 161/94    Pulse: 88 92 88 93   Resp: 15 12 16 13   Temp:       SpO2: 94% 95% 95% 98%   Weight:       Height:                Physical Exam   Constitutional: He is oriented to person, place, and time. He appears well-developed and well-nourished. He appears distressed. Slightly pale appearing, in mild acute distress. HENT:   Head: Normocephalic and atraumatic. Right Ear: External ear normal.   Left Ear: External ear normal.   Nose: Nose normal.   Mouth/Throat: Oropharynx is clear and moist.   Eyes: Conjunctivae and EOM are normal. Pupils are equal, round, and reactive to light. Neck: Normal range of motion. Neck supple. No JVD present. No thyromegaly present. Cardiovascular: Normal rate, regular rhythm, normal heart sounds and intact distal pulses. No murmur heard. Pulmonary/Chest: Effort normal and breath sounds normal. No respiratory distress. He has no wheezes. He has no rales. Abdominal: Soft. Bowel sounds are normal. He exhibits no distension. There is no tenderness. Musculoskeletal: Normal range of motion. He exhibits no edema. Neurological: He is alert and oriented to person, place, and time. No cranial nerve deficit. Skin: Skin is warm and dry. No rash noted. Psychiatric: He has a normal mood and affect. His behavior is normal. Thought content normal.   Vitals reviewed. MDM  Number of Diagnoses or Management Options  Acute myocardial infarction of inferior wall, initial episode of care [facility]):   Diagnosis management comments:   Upon arrival in the emergency department a code STEMI was called.  I discussed the case on the phone with Dr. Gilberto Chen, cardiologist on call. He is on his way to the hospital to take the patient to the cardiac Cath Lab for revascularization. Patient was hypertensive upon arrival to the emergency department, was given one sublingual nitroglycerin which diminished his pain to 1/10. He was given 3 baby aspirins to chew. He was given a dose of beta blockers. As well as a bolus of heparin. Point of care troponin is elevated. 7:25 PM  Dr. Yola Gil is at bedside along with the team from the cardiac catheter lab. Plan is to urgently go to the lab for revascularization procedure. Patient remains hemodynamically stable at this time and is understanding and in agreement with plan. Critical time spent on this patient independent of procedures 30 minutes. Amount and/or Complexity of Data Reviewed  Clinical lab tests: ordered and reviewed  Tests in the radiology section of CPT®: ordered and reviewed  Tests in the medicine section of CPT®: reviewed and ordered  Discuss the patient with other providers: yes  Independent visualization of images, tracings, or specimens: yes    Risk of Complications, Morbidity, and/or Mortality  Presenting problems: high  Diagnostic procedures: high  Management options: high    Patient Progress  Patient progress: stable    ED Course       Procedures    EKG interpretation: EKG was performed at 1845 in the emergency department. Rate is 96. It is a normal sinus rhythm with a normal axis. There is ST elevation in leads 3 and aVF with reciprocal changes laterally consistent with an acute inferior wall MI. There is no ectopy.

## 2017-10-01 NOTE — ED NOTES
Emergency Department Nursing Plan of Care       The Nursing Plan of Care is developed from the Nursing assessment and Emergency Department Attending provider initial evaluation. The plan of care may be reviewed in the ED Provider note.     The Plan of Care was developed with the following considerations:   Patient / Family readiness to learn indicated by:verbalized understanding  Persons(s) to be included in education: patient and family  Barriers to Learning/Limitations:No    Signed     Suzi Bright RN    10/1/2017   7:11 PM

## 2017-10-01 NOTE — PROGRESS NOTES
[facility]: MED RECONCILIATION    Comments/Recommendations:     Patient is awake and alert  Verifies allergies  The patient denies taking any medications prescription or OTC  The patient confirms he has high blood pressure but has been able to control this without medication    Allergies: Ampicillin    Prior to Admission Medications:     Medication Documentation Review Audit       Reviewed by Alberta Cole PHARMD (Pharmacist) on 10/01/17 at [address] [place] [address] Provider Last Dose Status Taking?                **No Medications to Display**                                Thank you,      Anne Bishop, PriyankaD, BCPS

## 2017-10-02 PROBLEM — I25.10 CORONARY ARTERY DISEASE INVOLVING NATIVE CORONARY ARTERY: Status: ACTIVE | Noted: 2017-10-02

## 2017-10-02 LAB
ANION GAP SERPL CALC-SCNC: 11 MMOL/L (ref 5–15)
ATRIAL RATE: 94 BPM
ATRIAL RATE: 96 BPM
BUN SERPL-MCNC: 16 MG/DL (ref 6–20)
BUN/CREAT SERPL: 12 (ref 12–20)
CALCIUM SERPL-MCNC: 8.5 MG/DL (ref 8.5–10.1)
CALCULATED P AXIS, ECG09: 60 DEGREES
CALCULATED P AXIS, ECG09: 62 DEGREES
CALCULATED R AXIS, ECG10: 27 DEGREES
CALCULATED R AXIS, ECG10: 29 DEGREES
CALCULATED T AXIS, ECG11: 100 DEGREES
CALCULATED T AXIS, ECG11: 94 DEGREES
CHLORIDE SERPL-SCNC: 106 MMOL/L (ref 97–108)
CHOLEST SERPL-MCNC: 151 MG/DL
CO2 SERPL-SCNC: 26 MMOL/L (ref 21–32)
CREAT SERPL-MCNC: 1.33 MG/DL (ref 0.7–1.3)
DIAGNOSIS, 93000: NORMAL
DIAGNOSIS, 93000: NORMAL
ERYTHROCYTE [DISTWIDTH] IN BLOOD BY AUTOMATED COUNT: 13.2 % (ref 11.5–14.5)
EST. AVERAGE GLUCOSE BLD GHB EST-MCNC: 123 MG/DL
GLUCOSE SERPL-MCNC: 127 MG/DL (ref 65–100)
HBA1C MFR BLD: 5.9 % (ref 4.2–6.3)
HCT VFR BLD AUTO: 35.3 % (ref 36.6–50.3)
HDLC SERPL-MCNC: 34 MG/DL
HDLC SERPL: 4.4 {RATIO} (ref 0–5)
HGB BLD-MCNC: 11.4 G/DL (ref 12.1–17)
LDLC SERPL CALC-MCNC: 85.4 MG/DL (ref 0–100)
LIPID PROFILE,FLP: ABNORMAL
MCH RBC QN AUTO: 28.9 PG (ref 26–34)
MCHC RBC AUTO-ENTMCNC: 32.3 G/DL (ref 30–36.5)
MCV RBC AUTO: 89.6 FL (ref 80–99)
P-R INTERVAL, ECG05: 150 MS
P-R INTERVAL, ECG05: 156 MS
PLATELET # BLD AUTO: 213 K/UL (ref 150–400)
POTASSIUM SERPL-SCNC: 4 MMOL/L (ref 3.5–5.1)
Q-T INTERVAL, ECG07: 358 MS
Q-T INTERVAL, ECG07: 370 MS
QRS DURATION, ECG06: 102 MS
QRS DURATION, ECG06: 96 MS
QTC CALCULATION (BEZET), ECG08: 452 MS
QTC CALCULATION (BEZET), ECG08: 462 MS
RBC # BLD AUTO: 3.94 M/UL (ref 4.1–5.7)
SODIUM SERPL-SCNC: 143 MMOL/L (ref 136–145)
TRIGL SERPL-MCNC: 158 MG/DL (ref ?–150)
TROPONIN I SERPL-MCNC: 32.22 NG/ML
TSH SERPL DL<=0.05 MIU/L-ACNC: 1.13 UIU/ML (ref 0.36–3.74)
VENTRICULAR RATE, ECG03: 94 BPM
VENTRICULAR RATE, ECG03: 96 BPM
VLDLC SERPL CALC-MCNC: 31.6 MG/DL
WBC # BLD AUTO: 9.4 K/UL (ref 4.1–11.1)

## 2017-10-02 PROCEDURE — 93306 TTE W/DOPPLER COMPLETE: CPT

## 2017-10-02 PROCEDURE — 65660000000 HC RM CCU STEPDOWN

## 2017-10-02 PROCEDURE — 84484 ASSAY OF TROPONIN QUANT: CPT | Performed by: SPECIALIST

## 2017-10-02 PROCEDURE — 77030027138 HC INCENT SPIROMETER -A

## 2017-10-02 PROCEDURE — 80048 BASIC METABOLIC PNL TOTAL CA: CPT | Performed by: SPECIALIST

## 2017-10-02 PROCEDURE — 74011250637 HC RX REV CODE- 250/637: Performed by: SPECIALIST

## 2017-10-02 PROCEDURE — 36415 COLL VENOUS BLD VENIPUNCTURE: CPT | Performed by: SPECIALIST

## 2017-10-02 PROCEDURE — 74011250637 HC RX REV CODE- 250/637: Performed by: INTERNAL MEDICINE

## 2017-10-02 PROCEDURE — 85027 COMPLETE CBC AUTOMATED: CPT | Performed by: SPECIALIST

## 2017-10-02 PROCEDURE — 84443 ASSAY THYROID STIM HORMONE: CPT | Performed by: SPECIALIST

## 2017-10-02 PROCEDURE — 80061 LIPID PANEL: CPT | Performed by: SPECIALIST

## 2017-10-02 PROCEDURE — 83036 HEMOGLOBIN GLYCOSYLATED A1C: CPT | Performed by: NURSE PRACTITIONER

## 2017-10-02 RX ORDER — METOPROLOL TARTRATE 25 MG/1
12.5 TABLET, FILM COATED ORAL 2 TIMES DAILY
Status: DISCONTINUED | OUTPATIENT
Start: 2017-10-02 | End: 2017-10-03 | Stop reason: HOSPADM

## 2017-10-02 RX ADMIN — METOPROLOL TARTRATE 12.5 MG: 25 TABLET ORAL at 17:29

## 2017-10-02 RX ADMIN — Medication 10 ML: at 22:19

## 2017-10-02 RX ADMIN — CLOPIDOGREL BISULFATE 75 MG: 75 TABLET ORAL at 09:02

## 2017-10-02 RX ADMIN — Medication 10 ML: at 22:20

## 2017-10-02 RX ADMIN — ATORVASTATIN CALCIUM 80 MG: 20 TABLET, FILM COATED ORAL at 22:18

## 2017-10-02 RX ADMIN — ASPIRIN 81 MG 81 MG: 81 TABLET ORAL at 09:02

## 2017-10-02 RX ADMIN — Medication 10 ML: at 17:30

## 2017-10-02 RX ADMIN — METOPROLOL TARTRATE 25 MG: 25 TABLET ORAL at 00:56

## 2017-10-02 NOTE — PROGRESS NOTES
TRANSFER - IN REPORT:    Verbal report received from [facility]) on Misael Nelson  being received from ICU(unit) for routine progression of care      Report consisted of patients Situation, Background, Assessment and   Recommendations(SBAR). Information from the following report(s) SBAR, Kardex, ED Summary, Procedure Summary, Intake/Output, MAR, Accordion, Recent Results, Med Rec Status and Cardiac Rhythm normal sinus rhythm was reviewed with the receiving nurse. Opportunity for questions and clarification was provided. Assessment completed upon patients arrival to unit and care assumed.

## 2017-10-02 NOTE — PROGRESS NOTES
Report received from [facility] in the cath lab. Per RN when patient arrived to floor, sheath  to be pulled 2 hours after angio max drip complete. Pt has sheath in right groin. No bleeding no hematoma. Angiomax still infusing on arrival to ICU. Pt is AAO. Pt family is at bedside. Pastoral Care and  to bedside. Dr. Antony Quinonez to bedside to update patient and family on situation and answer questions. Will continue to monitor. 0130 Pt sheath pulled. Pt achieved hemostasis at 0140. No bleeding no hematoma. Pt tolerated procedure. Instructed on keeping leg straight and lying flat for 2 more hours. 0630 Called troponin to Dr. Antony Quinonez. No new orders.

## 2017-10-02 NOTE — PROGRESS NOTES
NUTRITION education       Nutrition Assessment:   66 yo male admitted with STEMI, s/p cardiac cath. Visited pt this afternoon. Tolerating diet, appetite good. Labs/Meds reviewed. , Chol 151. Pt stated wife his meals and she is \"usually\" prepares healthy foods. Pt did admit to eating [address] cakes/cookies frequently. Discussed heart healthy dietary guidelines including food selection, portion control, & meal planning. Handouts provided for reference along with contact information for further questions/concerns. Encouraged pt to call if needed. Pt verbalized understanding of information discussed. Nutrition Diagnoses:  Nutrition/food-related knowledge deficit [facility] dietary guidelines AEB recent MI, eating high fat/calorie foods    Intervention:   1. Brief Nutrition education (E - 1.2) including identification of high calorie/fat/Na+ foods, reading food labels, and replacing with healthier choices    2. Nutrition Counseling (C-2) including strategies for behavior modification, goal setting, and planning    Monitoring/Evaluation: Pt expressed understanding of education topics and acknowledged ability in applying diet goals. Pt answered questions posed to demonstrate learning.      Pino Simmons RD

## 2017-10-02 NOTE — PROGRESS NOTES
0715 Bedside and Verbal shift change report given to Nguyen Serrano RN  (oncoming nurse) by Carmela Cowden, RN (offgoing nurse).  Report included the following information SBAR, MAR, Recent Results and Cardiac Rhythm SR.

## 2017-10-02 NOTE — PROGRESS NOTES
TRANSFER - OUT REPORT:    Verbal report given to Elmira Wooten RN(name) on Seniat Servin  being transferred to [facility](unit) for routine progression of care       Report consisted of patients Situation, Background, Assessment and   Recommendations(SBAR). Information from the following report(s) SBAR, MAR, Recent Results and Cardiac Rhythm NSR was reviewed with the receiving nurse. Lines:   Peripheral IV 10/01/17 Left Antecubital (Active)   Site Assessment Clean, dry, & intact 10/2/2017  8:00 AM   Phlebitis Assessment 0 10/2/2017  8:00 AM   Infiltration Assessment 0 10/2/2017  8:00 AM   Dressing Status Clean, dry, & intact 10/2/2017  8:00 AM   Dressing Type Transparent 10/2/2017  8:00 AM   Hub Color/Line Status Pink;Capped;Flushed 10/2/2017  8:00 AM   Alcohol Cap Used Yes 10/2/2017  8:00 AM       Peripheral IV 10/01/17 Right Antecubital (Active)   Site Assessment Clean, dry, & intact 10/2/2017  8:00 AM   Phlebitis Assessment 0 10/2/2017  8:00 AM   Infiltration Assessment 0 10/2/2017  8:00 AM   Dressing Status Clean, dry, & intact 10/2/2017  8:00 AM   Dressing Type Transparent 10/2/2017  8:00 AM   Hub Color/Line Status Pink;Flushed;Capped 10/2/2017  8:00 AM   Alcohol Cap Used Yes 10/2/2017  8:00 AM        Opportunity for questions and clarification was provided.       Patient transported with:   Monitor  Registered Nurse

## 2017-10-02 NOTE — PROGRESS NOTES
Follow up visit with . Dawit Velazquez and his wife in [facility]. They are very thankful for all of the care they have received here at [facility]. Provided continued spiritual presence and assurance of prayer.   Visited by: Juanjose Vogel [address] [facility] Terrie ([id number])

## 2017-10-02 NOTE — PROGRESS NOTES
Seen patient. Discussed with wife and daughters in the room. Many question regarding CAD, anatomy and future care. Answered all and provided reassurance. Hitesh Nolan MD, [facility]

## 2017-10-02 NOTE — PROGRESS NOTES
Spiritual Care Assessment/Progress Notes    Luana Torres [id number]  [id number]    1960  65 y.o.  male    Patient Telephone Number: [phone] (home)   Religion Affiliation: Lutheran   Language: English   Extended Emergency Contact Information  Primary Emergency Contact: Zeghabna  Home Phone: [phone]  Relation: Spouse   Patient Active Problem List    Diagnosis Date Noted    STEMI (ST elevation myocardial infarction) ([facility] [address] 10/01/2017        Date: 10/1/2017       Level of Religion/Spiritual Activity:  [x]         Involved in kiersten tradition/spiritual practice    []         Not involved in kiersten tradition/spiritual practice  [x]         Spiritually oriented    []         Claims no spiritual orientation    []         seeking spiritual identity  []         Feels alienated from Yazdanism practice/tradition  []         Feels angry about Yazdanism practice/tradition  [x]         Spirituality/Yazdanism tradition IS a resource for coping at this time.   []         Not able to assess due to medical condition    Services Provided Today:  []         crisis intervention    []         reading Scriptures  [x]         spiritual assessment    [x]         prayer  [x]         empathic listening/emotional support  []         rites and rituals (cite in comments)  [x]         life review     []         Yazdanism support  []         theological development   []         advocacy  []         ethical dialog     []         blessing  []         bereavement support    [x]         support to family  []         anticipatory grief support   []         help with AMD  []         spiritual guidance    []         meditation      Spiritual Care Needs  [x]         Emotional Support  [x]         Spiritual/Religion Care  []         Loss/Adjustment  []         Advocacy/Referral                /Ethics  []         No needs expressed at               this time  []         Other: (note in               comments)  [address]  [] Follow up visits with               pt/family  []         Provide materials  []         Schedule sacraments  []         Contact Community               Clergy  [x]         Follow up as needed  []         Other: (note in               comments)     Comments: Responded to request for  to the ER for Code Stemi. Mr. Toney Villalobos wife was present. He asked for a  as I walked in the room. He appeared to be comfortable with  presence. Escorted his wife Britany Finn to the waiting area on the 2nd floor and he received an emergent procedure. She contacted their three children, two daughters here in [place] and a son at [facility]. They have a strong Buddhist kiersten. Provided calming presence as she shared about their kiersten, life together, children and her concerns. Provided spiritual presence as the Doctor spoke with her after the procedure, escorted her to the ICU waiting area. Her daughters arrived. Touched base with Mr. Rima Benitez who was pleased thing went ok. Provided prayer and Advised of  Availability. Had his Buddhist changed from unknown to [address] with admissions department.     Visited by: Keagan Mcleod [address] [facility] Hossein Falcon ([id number])

## 2017-10-02 NOTE — PROGRESS NOTES
Problem: Patient Education: Go to Patient Education Activity  Goal: Patient/Family Education  Outcome: Laxmi Moore to patient bedside to discuss results of cath and answer questions that patient may have

## 2017-10-02 NOTE — PROGRESS NOTES
I met with patient discuss Case management role. I verified home address, phone. Pt states he lives in a two story home, with 4 entry steps with his wife. Pt drives, and lives with his wife Alan Canas [phone]. I confirmed pharmacy is [facility]   Phone: [phone] Fax: [phone]. Patient PCP is Mallorie De La Rosa. Feliciano Blum MD  Pt has never had home health, No DME. Care Management Interventions  PCP Verified by CM:  Yes  MyChart Signup: No  Discharge Durable Medical Equipment: No  Physical Therapy Consult: No  Occupational Therapy Consult: No  Speech Therapy Consult: No  Current Support Network: Lives with Spouse  Confirm Follow Up Transport: Family  Plan discussed with Pt/Family/Caregiver: Yes  Discharge Location  Discharge Placement: Home

## 2017-10-02 NOTE — PROCEDURES
Cardiac Catheterization Procedure Note   Patient: Misael Nelson  MRN: [id number]  SSN: [id number]   YOB: 1960 Age: 65 y.o. Sex: male    Date of Procedure: 10/1/2017   Pre-procedure Diagnosis: STEMI  Post-procedure Diagnosis: Coronary Artery Disease  Procedure: coronaries, riri times two to prox/mid rca  :  Dr. Riana Jensen MD    Assistant(s):  None  Anesthesia: Moderate Sedation   Estimated Blood Loss: Less than 10 mL   Specimens Removed: None  Findings: mod diffuse lm disease, mild to mod diffuse lad, 60% d1. 80% very prox om1/ramus, 60% mid om2, totally occluded early mid rca with left to right collateral, 70% mid pda. Total rca treated with overlapping 3 by 22 and 2 by 14mm riri to 12 duglas. Excellent result. Could not get wire into origin pda after multiple attempts. No lv gram because of contrast load and elevated creatinine.   Complications: None   Implants:  None  Signed by:  Riana Jensen MD  10/1/2017  8:58 PM

## 2017-10-02 NOTE — CARDIO/PULMONARY
Cardiac Wellness: 10/2/2017 @ 2493:  Received cardiac rehab consult for MI education. MI education folder with catheterization brochure to bedside of Quentin Garcia. Educated using teach back method. Reviewed MI diagnosis definition and purpose of intervention. Pt is aware of MI and stent placement to RCA. Provided stent card. Reviewed post cath instructions via right femoral site, with emphasis on temporary restrictions, signs/symptoms of infection, f/u with MD, what to do if bleeding occurs, and importance of taking all meds as prescribed. Identified pertinent CAD risk factors including HTN, age, male gender, job stress, JONH and encouraged lifestyle modifications. Reviewed importance of medication compliance and follow up appointments with cardiologist.  Discussed purpose of ASA, Plavix, Lipitor, metoprolol and potential side effects, signs and symptoms of angina and what to report to physician after discharge. Smoking history assessed. Patient is a former smoker from college days but has not smoked in many years. Emphasized value of cardiac rehab, discussed Cardiac Wellness Program and encouraged enrollment. Pt reported knowledge of importance in attending Cardiac Wellness but was concerned about time and job responsibilities conflicting. Quentin Garcia verbalized understanding with questions answered. Cardiac Rehab will follow.

## 2017-10-02 NOTE — PROGRESS NOTES
Cardiology Daily Progress Note      Saranya Mosquera is a 65 y.o. male admitted with CAD, STEMI, S/p RCA stent, residual PDA, LCX and LAD dz. Overnight had bradycardia with rate of 50. No VT or NSVT. Today am had to hold Lopressor due to hypotension and bradycardia. Echo pending. Trop peak 33.             Visit Vitals    /55 (BP 1 Location: Right arm, BP Patient Position: At rest)    Pulse 78    Temp 99 °F (37.2 °C)    Resp 21    Ht 5' 10\" (1.778 m)    Wt 202 lb (91.6 kg)    SpO2 94%    BMI 28.98 kg/m2       Intake/Output Summary (Last 24 hours) at 10/02/17 0820  Last data filed at 10/02/17 0352   Gross per 24 hour   Intake            68.45 ml   Output              800 ml   Net          -731.55 ml     General appearance - alert, well appearing, and in no distress  Mental status - affect appropriate to mood  Eyes - sclera anicteric, moist mucous membranes  Neck - supple, no significant adenopathy  Chest - clear to auscultation, no wheezes, rales or rhonchi  Heart - normal rate, regular rhythm, normal S1, S2, no murmurs, rubs, clicks or gallops  Abdomen - soft, nontender, nondistended, no masses or organomegaly  Extremities - peripheral pulses normal, no pedal edema    Current Facility-Administered Medications   Medication Dose Route Frequency    sodium chloride (NS) flush 5-10 mL  5-10 mL IntraVENous PRN    nitroglycerin 100 mcg/ml compounded injection  1-10 mL IntraCORONary Multiple    atropine injection 0.4-1 mg  0.4-1 mg IntraVENous Multiple    bivalirudin (ANGIOMAX) 250 mg in 0.9% sodium chloride (MBP/ADV) 50 mL  1.75 mg/kg/hr IntraVENous CONTINUOUS    sodium chloride (NS) flush 5-10 mL  5-10 mL IntraVENous Q8H    sodium chloride (NS) flush 5-10 mL  5-10 mL IntraVENous PRN    metoprolol tartrate (LOPRESSOR) tablet 25 mg  25 mg Oral Q6H    aspirin chewable tablet 81 mg  81 mg Oral DAILY    clopidogrel (PLAVIX) tablet 75 mg  75 mg Oral DAILY    sodium chloride (NS) flush 5-10 mL 5-10 mL IntraVENous Q8H    sodium chloride (NS) flush 5-10 mL  5-10 mL IntraVENous PRN    atorvastatin (LIPITOR) tablet 80 mg  80 mg Oral QHS        No specialty comments available. Assessment:    - CAD/ STEMI  - HTN  - Dyslipidemia      Plan:     - Continue ASA, Plavix and Stains. Resume Lorpessor at Lower dose of 12.5 BID. - High dose statins per ACS protocol for few days until seen as OP.   - Move out of ICU to  today. PT/ OT. Cardiac rehab consultation.   - Dr. Roopa Briceno to review the cath for PDA lesion.   - Possible DC home tomorrow or later today if all goes well.              ___________________________________________________    Judy Urias.  Quentin Chandler MD, [facility]

## 2017-10-02 NOTE — DISCHARGE SUMMARY
Cardiology Discharge Summary     Patient ID:       Quiana Singh  [id number]  1 y.o.  1960    Admit Date: 10/1/2017    Discharge Date: 10/3/2017     Admitting Physician: No admitting provider for patient encounter. PCP: Denver Dubon MD    Discharge Physician: Dr Dominga Tejada:                     Cardiac Rehab    Admission Diagnoses: STEMI (ST elevation myocardial infarction) [facility])    Discharge Diagnoses:   Patient Active Problem List   Diagnosis Code    STEMI (ST elevation myocardial infarction) ([facility] [address] I21.3    Coronary artery disease involving native coronary artery I25.10       Discharge Condition: Stable    Care Coordination: 60  minutes spent coordinating care, DC planning, arranging follow up, reviewing dx, medications, treatment plan, follow up plans. Cardiology Procedures this Admission:  Left heart catheterization with PCI  EchoCardiogram    Hospital Course:    Quiana Singh is a 66 yo male Hx HTN and JONH pw  stuttering chest and epigastric pain  for a couple of days, worse on the day of admit while walking. PW w/ acute IMI. EKG showed acute IMI. He was tx w/ IV heparin, ASA, IV metoprolol and taken urgently to the cath lab. Cardiac cath demonstrated  : LM: mod, LAD: mild-mod;D1: p60%, OM2:m60%: Ramus p80%, RCA: p100%; L-> R collaterals distally, PDA: m70%  ---- s/p ANNE (Resolute 3x22, 2x15) -> RCA    Dr Montserrat Inman and Dr Meño Rebollar both reviewed reviewed films for PDA lesion. Given that pt is post ANNE and now pain free decision to wait on further eval of additional  Intervention due to need for uninterrupted DAPT with ANNE. This was reviewed at length the pt and his spouse. He  was admitted to  ICU for further observation and medical therapy. He  was treated with DAPT, statin, and betablocker, nitrates, ACE-I. He has some transient bradycardia and hypotensionl so the BB was held and dose adjusted.           ECHO showed LVEF 55-60%      Refer patient to phase II outpatient cardiac rehab once seen in follow un in the office. LIPIDS:high dose statin    Lab Results   Component Value Date/Time    Cholesterol, total 151 10/02/2017 05:22 AM    HDL Cholesterol 34 10/02/2017 05:22 AM    LDL, calculated 85.4 10/02/2017 05:22 AM    Triglyceride 158 10/02/2017 05:22 AM    CHOL/HDL Ratio 4.4 10/02/2017 05:22 AM     Lab Results   Component Value Date/Time    INR 0.9 10/01/2017 06:59 PM    Prothrombin time 9.5 10/01/2017 06:59 PM    PLATELET 408 60/63/3727 05:22 AM         Accel HTN: BP in ED elevated 190 range. Treated with IV betablocker and po, plus topical nitrates. The patient was seen by cardiac rehab for education. Deconditioning: up ambulating independently. Case mangement was consulted for discharge planning. The patient/family was kept apprised of his disease process and treatment plan of care. After receiving maximum benefit from his in-patient hospitalization, he was ready for discharge. At the time of discharge he was up ambulating and hemodynamically stable. Discharge Exam:     Visit Vitals    /77 (BP 1 Location: Right arm, BP Patient Position: At rest)    Pulse 85    Temp 98.8 °F (37.1 °C)    Resp 18    Ht 5' 10\" (1.778 m)    Wt 192 lb 14.4 oz (87.5 kg)    SpO2 99%    BMI 27.68 kg/m2     See Final Progress Note    Disposition: home    Patient Instructions: see AVS       Referenced discharge instructions provided by nursing for diet and activity. Follow-up with Dr. Sofía Bullock up With Details Michelle Rodriguez MD On 10/6/2017 2 pm  [facility] [address] [place]   [address]  [phone]            Signed:    10/3/2017      Cardiology Attending:    Patient personally seen and examined. All the elements of history and examination were personally performed.  Assessment and plan was discussed and agree as written above. Discharge time >30min    Hitesh Chandler MD, [facility] [place]

## 2017-10-02 NOTE — CDMP QUERY
The diagnosis of untreated HTN has been documented for this patient. The following is also noted in the medical record:    * On arrival, /122, 196/117, 176/104. * Admitted for inferior STEMI  * Given Lopressor 5 mg IV, NTG 0.4 mg SL x 1, and Lopressor 25 mg po x 1  * Started on Lopressor 12.5 mg po BID    Based on your medical judgement, could your documentation be further specified as:     =>Hypertensive urgency  =>Hypertensive crisis  =>Hypertensive emergency  =>Other Explanation of clinical findings  =>Unable to Determine (no explanation of clinical findings)      Please clarify and document your clinical opinion in the progress notes and discharge summary including the definitive and/or presumptive diagnosis, (suspected or probable), related to the above clinical findings. Please include clinical findings supporting your diagnosis.      REFERENCE:  -----------------------------------------------  Hypertensive Urgency: SBP > 180 or DBP > 120 in the absence of progressive target organ dysfunction     Hypertensive Crisis: BP elevates rapidly and severely enough to potentially cause organ damage (e.g. severe HA, SOB, nosebleed, severe anxiety, nausea/vomiting, etc.)    Hypertensive Emergency: SBP >180 or DBP > 120 with associated organ dysfunction (e.g. CVA, LOC, memory loss, MI, JOSR, aortic dissection, angina, pulmonary edema, encephalopathy, retinopathy, HELLP, etc.)    Grace Padilla, [facility] [address]  [email or web address]

## 2017-10-02 NOTE — PROGRESS NOTES
Bedside and Verbal shift change report given to Raj German (oncoming nurse) by Robles Huddleston RN (offgoing nurse). Report included the following information SBAR, Kardex, Procedure Summary, Intake/Output, MAR, Accordion, Recent Results, Med Rec Status and Cardiac Rhythm normal sinus rhythm.

## 2017-10-03 VITALS
HEART RATE: 79 BPM | DIASTOLIC BLOOD PRESSURE: 77 MMHG | HEIGHT: 70 IN | SYSTOLIC BLOOD PRESSURE: 159 MMHG | BODY MASS INDEX: 27.62 KG/M2 | RESPIRATION RATE: 18 BRPM | TEMPERATURE: 98.8 F | OXYGEN SATURATION: 99 % | WEIGHT: 192.9 LBS

## 2017-10-03 LAB
ANION GAP SERPL CALC-SCNC: 10 MMOL/L (ref 5–15)
ATRIAL RATE: 75 BPM
BACTERIA SPEC CULT: NORMAL
BACTERIA SPEC CULT: NORMAL
BUN SERPL-MCNC: 17 MG/DL (ref 6–20)
BUN/CREAT SERPL: 14 (ref 12–20)
CALCIUM SERPL-MCNC: 8.7 MG/DL (ref 8.5–10.1)
CALCULATED P AXIS, ECG09: 52 DEGREES
CALCULATED R AXIS, ECG10: -18 DEGREES
CALCULATED T AXIS, ECG11: -40 DEGREES
CHLORIDE SERPL-SCNC: 106 MMOL/L (ref 97–108)
CO2 SERPL-SCNC: 28 MMOL/L (ref 21–32)
CREAT SERPL-MCNC: 1.23 MG/DL (ref 0.7–1.3)
DIAGNOSIS, 93000: NORMAL
GLUCOSE SERPL-MCNC: 94 MG/DL (ref 65–100)
P-R INTERVAL, ECG05: 164 MS
POTASSIUM SERPL-SCNC: 4.1 MMOL/L (ref 3.5–5.1)
Q-T INTERVAL, ECG07: 408 MS
QRS DURATION, ECG06: 100 MS
QTC CALCULATION (BEZET), ECG08: 455 MS
SERVICE CMNT-IMP: NORMAL
SODIUM SERPL-SCNC: 144 MMOL/L (ref 136–145)
VENTRICULAR RATE, ECG03: 75 BPM

## 2017-10-03 PROCEDURE — 80048 BASIC METABOLIC PNL TOTAL CA: CPT | Performed by: NURSE PRACTITIONER

## 2017-10-03 PROCEDURE — 74011250637 HC RX REV CODE- 250/637: Performed by: INTERNAL MEDICINE

## 2017-10-03 PROCEDURE — 93005 ELECTROCARDIOGRAM TRACING: CPT

## 2017-10-03 PROCEDURE — 74011250637 HC RX REV CODE- 250/637: Performed by: NURSE PRACTITIONER

## 2017-10-03 PROCEDURE — 74011250637 HC RX REV CODE- 250/637: Performed by: SPECIALIST

## 2017-10-03 PROCEDURE — 36415 COLL VENOUS BLD VENIPUNCTURE: CPT | Performed by: NURSE PRACTITIONER

## 2017-10-03 RX ORDER — ATORVASTATIN CALCIUM 80 MG/1
80 TABLET, FILM COATED ORAL
Qty: 30 TAB | Refills: 1 | Status: SHIPPED | OUTPATIENT
Start: 2017-10-03 | End: 2017-11-28 | Stop reason: SDUPTHER

## 2017-10-03 RX ORDER — ADENOSINE 3 MG/ML
140 INJECTION, SOLUTION INTRAVENOUS ONCE
Status: DISCONTINUED | OUTPATIENT
Start: 2017-10-03 | End: 2017-10-03

## 2017-10-03 RX ORDER — GUAIFENESIN 100 MG/5ML
81 LIQUID (ML) ORAL DAILY
Status: SHIPPED | COMMUNITY
Start: 2017-10-03

## 2017-10-03 RX ORDER — HEPARIN SODIUM 1000 [USP'U]/ML
5000 INJECTION, SOLUTION INTRAVENOUS; SUBCUTANEOUS ONCE
Status: DISCONTINUED | OUTPATIENT
Start: 2017-10-03 | End: 2017-10-03

## 2017-10-03 RX ORDER — LIDOCAINE HYDROCHLORIDE 10 MG/ML
20 INJECTION INFILTRATION; PERINEURAL
Status: DISCONTINUED | OUTPATIENT
Start: 2017-10-03 | End: 2017-10-03 | Stop reason: HOSPADM

## 2017-10-03 RX ORDER — HEPARIN SODIUM 200 [USP'U]/100ML
500 INJECTION, SOLUTION INTRAVENOUS ONCE
Status: DISCONTINUED | OUTPATIENT
Start: 2017-10-03 | End: 2017-10-03

## 2017-10-03 RX ORDER — ISOSORBIDE MONONITRATE 30 MG/1
30 TABLET, EXTENDED RELEASE ORAL DAILY
Qty: 30 TAB | Refills: 3 | Status: SHIPPED | OUTPATIENT
Start: 2017-10-03 | End: 2018-01-25 | Stop reason: SDUPTHER

## 2017-10-03 RX ORDER — MIDAZOLAM HYDROCHLORIDE 1 MG/ML
.5-1 INJECTION, SOLUTION INTRAMUSCULAR; INTRAVENOUS
Status: DISCONTINUED | OUTPATIENT
Start: 2017-10-03 | End: 2017-10-03 | Stop reason: HOSPADM

## 2017-10-03 RX ORDER — ISOSORBIDE MONONITRATE 30 MG/1
30 TABLET, EXTENDED RELEASE ORAL DAILY
Status: DISCONTINUED | OUTPATIENT
Start: 2017-10-03 | End: 2017-10-03 | Stop reason: HOSPADM

## 2017-10-03 RX ORDER — FENTANYL CITRATE 50 UG/ML
25-200 INJECTION, SOLUTION INTRAMUSCULAR; INTRAVENOUS
Status: DISCONTINUED | OUTPATIENT
Start: 2017-10-03 | End: 2017-10-03 | Stop reason: HOSPADM

## 2017-10-03 RX ORDER — CLOPIDOGREL BISULFATE 75 MG/1
75 TABLET ORAL DAILY
Qty: 30 TAB | Refills: 11 | Status: SHIPPED | OUTPATIENT
Start: 2017-10-03 | End: 2018-09-20 | Stop reason: SDUPTHER

## 2017-10-03 RX ORDER — METOPROLOL TARTRATE 25 MG/1
12.5 TABLET, FILM COATED ORAL 2 TIMES DAILY
Qty: 60 TAB | Refills: 3 | Status: SHIPPED | OUTPATIENT
Start: 2017-10-03 | End: 2018-05-30 | Stop reason: SDUPTHER

## 2017-10-03 RX ORDER — LISINOPRIL 5 MG/1
5 TABLET ORAL DAILY
Qty: 30 TAB | Refills: 3 | Status: SHIPPED | OUTPATIENT
Start: 2017-10-03 | End: 2018-01-25 | Stop reason: SDUPTHER

## 2017-10-03 RX ORDER — LISINOPRIL 5 MG/1
5 TABLET ORAL DAILY
Status: DISCONTINUED | OUTPATIENT
Start: 2017-10-03 | End: 2017-10-03 | Stop reason: HOSPADM

## 2017-10-03 RX ADMIN — CLOPIDOGREL BISULFATE 75 MG: 75 TABLET ORAL at 08:44

## 2017-10-03 RX ADMIN — METOPROLOL TARTRATE 12.5 MG: 25 TABLET ORAL at 08:44

## 2017-10-03 RX ADMIN — Medication 10 ML: at 06:00

## 2017-10-03 RX ADMIN — Medication 10 ML: at 13:20

## 2017-10-03 RX ADMIN — ISOSORBIDE MONONITRATE 30 MG: 30 TABLET ORAL at 13:17

## 2017-10-03 RX ADMIN — Medication 10 ML: at 13:21

## 2017-10-03 RX ADMIN — ASPIRIN 81 MG 81 MG: 81 TABLET ORAL at 08:44

## 2017-10-03 RX ADMIN — LISINOPRIL 5 MG: 5 TABLET ORAL at 13:18

## 2017-10-03 NOTE — ROUTINE PROCESS
Bedside and Verbal shift change report given to Jolanta Rider (oncoming nurse) by Aditya Cordon RN (offgoing nurse). Report included the following information SBAR, Kardex, Intake/Output, MAR, Accordion and Recent Results. Bedside and Verbal shift change report given to Gloria Solomon RN (oncoming nurse) by Terri Zhang RN (offgoing nurse). Report included the following information SBAR, Kardex, Intake/Output, MAR, Accordion and Recent Results.

## 2017-10-03 NOTE — CARDIO/PULMONARY
Cardiac Wellness: 10/3/2017 @ 0930:  Spoke with Torsten Youngblood NP regarding pt's disposition. He will not be d/c today. Pt had PCI /ANNE to RCA done 10/1/17. Due to extended invovlement of the LAD lesion, pt will return to cath lab for possible intervention. Ask not to see pt pre-cath but f/u after cath. 10/3/2017 @ 1400:  Pt was scheduled for second cardiac cath however received call from [employer] NP that cardiac cath has been cancelled. Pt to be d/c home. Met with pt sitting up in chair on [facility]. Pt is aware of no cath and why as well as new meds. Reviewed importance of medication compliance since pt has been started on several new meds including: ASA, Plavix, Lipitor, metoprolol, Imdur and lisinopril. Discussed purpose and side effects. Encourage pt to use BP machine to monitor HR/BP with new meds and to report signs and symptoms of angina and what to report to physician after discharge. Attached new meds information to AVS.  Pt has appt with Dr. Jonathan Roberto on Friday for f/u at which time they will  discuss Cardiac Wellness Program attenence further. Malik Camara verbalized understanding with questions answered.

## 2017-10-03 NOTE — PROGRESS NOTES
Preparing discharge instructions. All care plans and education are complete. RX for atorvastatin, clopidogrel, isosorbide mononitrate, lisinopril, and metoprolol tartrate were sent to [facility] per chart.

## 2017-10-03 NOTE — PROGRESS NOTES
Cardiology Daily Progress Note      Geena Toussaint is a 65 y.o. male admitted with CAD, STEMI, S/p RCA stent, residual PDA, LCX and LAD dz. No further bradycardia or hypotension    Pain free     Up ad franklin in room. Echo shows nml lVEF. Trop peak 33.             Visit Vitals    /77 (BP 1 Location: Right arm, BP Patient Position: At rest)    Pulse 85    Temp 98.8 °F (37.1 °C)    Resp 18    Ht 5' 10\" (1.778 m)    Wt 192 lb 14.4 oz (87.5 kg)    SpO2 99%    BMI 27.68 kg/m2       Intake/Output Summary (Last 24 hours) at 10/03/17 1340  Last data filed at 10/03/17 0847   Gross per 24 hour   Intake              600 ml   Output                0 ml   Net              600 ml     General appearance - alert, well appearing, and in no distress  Mental status - affect appropriate to mood  Eyes - sclera anicteric, moist mucous membranes  Neck - supple, no significant adenopathy  Chest - clear to auscultation, no wheezes, rales or rhonchi  Heart - normal rate, regular rhythm, normal S1, S2, no murmurs, rubs, clicks or gallops  Abdomen - soft, nontender, nondistended, no masses or organomegaly  Extremities - peripheral pulses normal, no pedal edema    Current Facility-Administered Medications   Medication Dose Route Frequency    fentaNYL citrate (PF) injection  mcg   mcg IntraVENous Multiple    lidocaine (XYLOCAINE) 10 mg/mL (1 %) injection 20 mL  20 mL SubCUTAneous Multiple    iopamidol (ISOVUE-370) 76 % injection 200 mL  200 mL IntraVENous Multiple    midazolam (VERSED) injection 0.5-10 mg  0.5-10 mg IntraVENous Multiple    isosorbide mononitrate ER (IMDUR) tablet 30 mg  30 mg Oral DAILY    lisinopril (PRINIVIL, ZESTRIL) tablet 5 mg  5 mg Oral DAILY    metoprolol tartrate (LOPRESSOR) tablet 12.5 mg  12.5 mg Oral BID    sodium chloride (NS) flush 5-10 mL  5-10 mL IntraVENous PRN    nitroglycerin 100 mcg/ml compounded injection  1-10 mL IntraCORONary Multiple    atropine injection 0.4-1 mg  0.4-1 mg IntraVENous Multiple    sodium chloride (NS) flush 5-10 mL  5-10 mL IntraVENous Q8H    sodium chloride (NS) flush 5-10 mL  5-10 mL IntraVENous PRN    aspirin chewable tablet 81 mg  81 mg Oral DAILY    clopidogrel (PLAVIX) tablet 75 mg  75 mg Oral DAILY    sodium chloride (NS) flush 5-10 mL  5-10 mL IntraVENous Q8H    sodium chloride (NS) flush 5-10 mL  5-10 mL IntraVENous PRN    atorvastatin (LIPITOR) tablet 80 mg  80 mg Oral QHS        CATH 10/1/17: LM: mod, LAD: mild-mod;D1: p60%, OM2:m60%: Ramus p80%, RCA: p100%; L-> R collaterals distally, PDA: m70%  ---- s/p ANNE (Resolute 3x22, 2x15) -> RCA    ECHO 10/2/2017 LVEF 5-60% No WMA. Assessment:    - CAD/ STEMI  - HTN  - Dyslipidemia      Plan:     - Continue ASA, Plavix and high dose statins. Cont Lopressor at 12.5 BID. Add Imdur 30 mg every day. Add lisinopril.   - High dose statins per ACS protocol for few days until seen as OP.   - Cardiac rehab consultation.   - Dr. Barney Carlton reviewed cath films. Given the fact that pt has ANNE and is chest pain free will wait on repeat cath for FFR to LAD. If CABG needed he would need to be off plavix for several days pre op. This was discussed at length with pt and his spouse by Dr Barney Carlton and Dr. Adalgisa Mascorro  - Pt ambulated 6 minutes in eng with nurse this afternoon. HR max 90. Ending /77, no chest pain, heaviness or dyspnea. - Pt to remain out of work until he sees Dr. Adalgisa Mascorro in office.  - Plan for cardiac rehab in 2-3 weeks. - instructed to call our office with any concerns/symptoms.   - Home later today.      ___________________________________________________    Heriberto Horn NP    Cardiology Attending:    Patient personally seen and examined. All the elements of history and examination were personally performed. Assessment and plan was discussed and agree as written above. - Extensive medical decision making discussion with Dr. Russell Ruiz, Dr. Barney Carlton and Mr. Luis Tirado.  Reviewed cath films with all of them. Tough situation due to severe disease in left system with very small vessels. Logistically it will be difficult to do CABG currently due to DAPT. - Best option is medical management, f/u closely as OP, cardiac rehab and revisit in 6 months to see if CABG is an option.   - He is agreeable to this plan. Hitesh Maynard MD, [facility] [place]

## 2017-10-03 NOTE — ADVANCED PRACTICE NURSE
L heart cath today with FFR > LAD , possible PCI, 12:30 Dr. Ayden Dolan. Dr [facility].

## 2017-10-03 NOTE — DISCHARGE INSTRUCTIONS
Pa Wang Office: [address] [place] [address], [address] [place] office: Sigrid                               [phone]    Patient Discharge Instructions    Armida Corado / [id number] : 1960    Admitted 10/1/2017 Discharged: 10/3/2017   Cardiologist:  Dr Jany Joel     PCP:  Kimberly Ortiz MD    Diagnosis:   Patient Active Problem List   Diagnosis Code    STEMI (ST elevation myocardial infarction) ([facility] I21.3    Coronary artery disease involving native coronary artery I25.10     Procedures/Test: ECHO: Pump function (LVEF) normal 55-60%                                 CATH: Drug stent placed in RCA     · It is important that you take the medication exactly as they are prescribed. · Keep your medication in the bottles provided by the pharmacist and keep a list of the medication names, dosages, and times to be taken in your wallet. · Do not take other medications without consulting your doctor. BRING ALL of YOUR MEDICINES TO YOUR OFFICE VISIT with Dr. Vilma Lieberman    Cardiac Catheterization  Discharge Instructions  *Check the puncture site frequently for swelling or bleeding. If you see any bleeding, lie down and apply pressure over the area with a clean town or washcloth. Notify your doctor for any redness, swelling, drainage or oozing from the puncture site. Notify your doctor for any fever or chills. *If the leg or arm with the puncture becomes cold, numb or painful, call Dr Vilma Lieberman [phone]    *Activity should be limited for the next 48 hours. Climb stairs as little as possible and avoid any stooping, bending or strenuous activity for 48 hours. No strenuous activity or heavy lifting over 10 lbs. for 7 days. Do not return to work until you see Dr Vilma Lieberman in the office. · You may take a shower 24 hours after your cardiac catheterization.   Be sure to get the dressing wet and then remove it; gently wash the area with warm soapy water. Pat dry and leave open to air. To help prevent infections, be sure to keep the cath site clean and dry. No lotions, creams, powders, ointments, etc. in the cath site for approximately 1 week. · Do not take a tub bath, get in a hot tub or swimming pool for approximately 5 days or until the cath site is completely healed. · Drink plenty of fluids for 24-48 hours after your cath to flush the contrast dye from your kidneys. No alcoholic beverages for 24 hours. You may resume your previous diet (low fat, low cholesterol) after your cath. · Do not drive or operate heavy machinery for 48 hours. · You may resume your usual diet. Drink more fluids than usual.  · Have a responsible person drive you home and stay with you for at least 24 hours after your heart catheterization/angiography. · *After your cath, some bruising or discomfort is common during the healing process. Tylenol, 1-2 tablets every 6 hours as needed, is recommended if you experience any discomfort. If you experience any signs or symptoms of infection such as fever, chills, or poorly healing incision, persistent tenderness or swelling in the groin, redness and/or warmth to the touch, numbness, significant tingling or pain at the groin site or affected extremity, rash, drainage from the cath site, or if the leg feels tight or swollen, call your physician right away.  If bleeding at the cath site occurs, take a clean gauze pad and apply direct pressure to the groin just above the puncture site. Call 911 immediately, and continue to apply direct pressure until an ambulance gets to your location. Diet: American Heart association, low fat, 1500 mg sodium. Lifestyle changes  Do not smoke: go to : PrimeLetters.ca. aspx for text reminders   Consider a Vegan diet- read Reversing and Preventing Heart Disease by Dr. Javier Aragon. Watch [place] over [employer] a heart-healthy diet that is low in cholesterol, saturated fat, and salt, and is full of fruits, vegetables and whole-grains. Eat at least two servings of fish each week. You may get more details about how to eat healthy, but these tips can help you get started. Www.aha.com  When should you call for help? Call 911 anytime you think you may need emergency care. For example, call if:  You have signs of a heart attack. These may include:  Chest pain or pressure. Sweating. Shortness of breath. Nausea or vomiting. Pain that spreads from the chest to the neck, jaw, or one or both shoulders or arms. Dizziness or lightheadedness. A fast or irregular pulse. After calling 911, chew 1 adult-strength aspirin. Wait for an ambulance. Do not try to drive yourself. You passed out (lost consciousness). You feel like you are having another heart attack. Call your doctor now or seek immediate medical care if:  You have had any chest pain, even if it has gone away. You have new or increased shortness of breath. You are dizzy or lightheaded, or you feel like you may faint. Watch closely for changes in your health, and be sure to contact your doctor if you have any problem      Information obtained by :  I understand that if any problems occur once I am at home I am to contact my physician. I understand and acknowledge receipt of the instructions indicated above.                                                                                                                                            R.N.'s Signature                                                                  Date/Time                                                                                                                                              Patient or Representative Signature Date/Time           Heart Attack: Care Instructions  Your Care Instructions    A heart attack (myocardial infarction, or MI) occurs when one or more of the coronary arteries, which supply the heart with oxygen-rich blood, is blocked. A blockage usually occurs when plaque inside the artery breaks open and a blood clot forms in the artery. After a heart attack, you may be worried about your future. Over the next several weeks, your heart will start to heal. Though it can be hard to break old habits, you can prevent another heart attack by making some lifestyle changes and by taking medicines. You may use this information for ideas about what to do at home to speed your recovery. Follow-up care is a key part of your treatment and safety. Be sure to make and go to all appointments, and call your doctor if you are having problems. It's also a good idea to know your test results and keep a list of the medicines you take. How can you care for yourself at home? Activity  · Until your doctor says it is okay, do not do strenuous exercise. And do not lift, pull, or push anything heavy. Ask your doctor what types of activities are safe for you. · If your doctor has not set you up with a cardiac rehabilitation (rehab) program, talk to him or her about whether that is right for you. Cardiac rehab includes supervised exercise. It also includes help with diet and lifestyle changes and emotional support. It may reduce your risk of future heart problems. · Increase your activities slowly. Take short rest breaks when you get tired. · If your doctor recommends it, get more exercise. Walking is a good choice. Bit by bit, increase the amount you walk every day. Try for at least 30 minutes on most days of the week. You also may want to swim, bike, or do other activities. Talk with your doctor before you start an exercise program to make sure it is safe for you.   · Ask your doctor when you can drive, go back to work, and do other daily activities again. · You can have sex as soon as you feel ready for it. Often this means when you can easily walk around or climb stairs. Talk with your doctor if you have any concerns. If you are taking nitroglycerin, do not take erection-enhancing medicine such as sildenafil (Viagra), tadalafil (Cialis), or vardenafil (Levitra) . Lifestyle changes  · Do not smoke. Smoking increases your risk of another heart attack. If you need help quitting, talk to your doctor about stop-smoking programs and medicines. These can increase your chances of quitting for good. · Eat a heart-healthy diet that is low in saturated fat and salt, and is full of fruits, vegetables and whole-grains. Eat at least two servings of fish each week. You may get more details about how to eat healthy. But these tips can help you get started. · Stay at a healthy weight, or lose weight if you need to. Medicines  · Be safe with medicines. Take your medicines exactly as prescribed. Call your doctor if you think you are having a problem with your medicine. You will get more details on the specific medicines your doctor prescribes. Do not stop taking your medicine unless your doctor tells you to. Not taking your medicine might raise your risk of having another heart attack. · You may need several medicines to help lower your risk of another heart attack. These include:  ¨ Blood pressure medicines such as angiotensin-converting enzyme (ACE) inhibitors, ARBs (angiotensin II receptor blockers), and beta-blockers. ¨ Cholesterol medicine called statins. ¨ Aspirin and other blood thinners. These prevent blood clots that can cause a heart attack. · If your doctor has given you nitroglycerin, keep it with you at all times. If you have angina symptoms, such as chest pain or pressure, sit down and rest. Take the first dose of nitroglycerin as directed. If symptoms get worse or are not getting better within 5 minutes, call 911 right away.  Stay on the phone. The emergency  will tell you what to do. · Do not take any over-the-counter medicines, vitamins, or herbal products without talking to your doctor first.  Staying healthy  · Manage other health conditions such as high blood pressure and diabetes. · Avoid colds and flu. Get a pneumococcal vaccine shot. If you have had one before, ask your doctor whether you need another dose. Get the flu vaccine every year. If you must be around people with colds or flu, wash your hands often. · Be sure to tell your doctor about any angina symptoms you have had, even if they went away. Pay attention to your angina symptoms. Know what is typical for you and learn how to control it. Know when to call for help. · Talk to your family, friends, or a counselor about your feelings. It is normal to feel frightened, angry, hopeless, helpless, and even guilty. Talking openly about bad feelings can help you cope. If you have symptoms of depression, talk to your doctor. When should you call for help? Call 911 anytime you think you may need emergency care. For example, call if:  · You have symptoms of a heart attack. These may include:  ¨ Chest pain or pressure, or a strange feeling in the chest.  ¨ Sweating. ¨ Shortness of breath. ¨ Nausea or vomiting. ¨ Pain, pressure, or a strange feeling in the back, neck, jaw, or upper belly or in one or both shoulders or arms. ¨ Lightheadedness or sudden weakness. ¨ A fast or irregular heartbeat. After you call 911, the  may tell you to chew 1 adult-strength or 2 to 4 low-dose aspirin. Wait for an ambulance. Do not try to drive yourself. · You have angina symptoms (such as chest pain or pressure) that do not go away with rest or are not getting better within 5 minutes after you take a dose of nitroglycerin. · You passed out (lost consciousness). · You feel like you are having another heart attack.   Call your doctor now or seek immediate medical care if:  · You are having angina symptoms, such as chest pain or pressure, more often than usual, or the symptoms are different or worse than usual.  · You have new or increased shortness of breath. · You are dizzy or lightheaded, or you feel like you may faint. Watch closely for changes in your health, and be sure to contact your doctor if you have any problems. Where can you learn more? Go to http://neema-trena.info/. Enter [id number] in the search box to learn more about \"Heart Attack: Care Instructions. \"  Current as of: August 8, 2016  Content Version: 11.3  © 5786-8280 [employer]. Care instructions adapted under license by [employer] (which disclaims liability or warranty for this information). If you have questions about a medical condition or this instruction, always ask your healthcare professional. [address] any warranty or liability for your use of this information.

## 2017-10-03 NOTE — PROGRESS NOTES
Pharmacist Discharge Medication Reconciliation    Discharge Provider:  Nikki Contreras NP/Dr. Vasquez       Discharge Medications:      Current Discharge Medication List        START taking these medications         Dose & Instructions Dispensing Information Comments   Morning Noon Evening Bedtime      aspirin 81 mg chewable tablet       Your last dose was: Your next dose is:              Dose:  81 mg   Take 1 Tab by mouth daily. Refills:  0                         atorvastatin 80 mg tablet   Commonly known as:  LIPITOR       Your last dose was: Your next dose is:              Dose:  80 mg   Take 1 Tab by mouth nightly. Quantity:  30 Tab   Refills:  1                         clopidogrel 75 mg Tab   Commonly known as:  PLAVIX       Your last dose was: Your next dose is:              Dose:  75 mg   Take 1 Tab by mouth daily. Quantity:  30 Tab   Refills:  11                         isosorbide mononitrate ER 30 mg tablet   Commonly known as:  IMDUR       Your last dose was: Your next dose is:              Dose:  30 mg   Take 1 Tab by mouth daily. Quantity:  30 Tab   Refills:  3                         lisinopril 5 mg tablet   Commonly known as:  Basilio Cope       Your last dose was: Your next dose is:              Dose:  5 mg   Take 1 Tab by mouth daily. Quantity:  30 Tab   Refills:  3                         metoprolol tartrate 25 mg tablet   Commonly known as:  LOPRESSOR       Your last dose was: Your next dose is:              Dose:  12.5 mg   Take 0.5 Tabs by mouth two (2) times a day.     Quantity:  60 Tab   Refills:  3                                  Where to Get Your Medications        These medications were sent to [place] [address] [place] [facility]  [address], [address]       Phone:  [phone]     atorvastatin 80 mg tablet    clopidogrel 75 mg Tab    isosorbide mononitrate ER 30 mg tablet    lisinopril 5 mg tablet    metoprolol tartrate 25 mg tablet             Patient admitted for CAD/STEMI s/p ANNE   DAPT, BB, ACE-Inhibitor, isosorbide, and high-intensity statin initiated during admission and to be continued upon discharge   Patient had not PTA meds   Follow up with Dr. Aden Sales No further recommendations    The patient's chart, MAR, and AVS were reviewed by   Yolanda Fagan, [address] [place] [facility] 23: [phone]

## 2017-10-06 ENCOUNTER — OFFICE VISIT (OUTPATIENT)
Dept: CARDIOLOGY CLINIC | Age: 57
End: 2017-10-06

## 2017-10-06 VITALS
SYSTOLIC BLOOD PRESSURE: 120 MMHG | HEIGHT: 70 IN | WEIGHT: 192 LBS | OXYGEN SATURATION: 99 % | RESPIRATION RATE: 16 BRPM | BODY MASS INDEX: 27.49 KG/M2 | DIASTOLIC BLOOD PRESSURE: 80 MMHG

## 2017-10-06 DIAGNOSIS — I25.119 CORONARY ARTERY DISEASE INVOLVING NATIVE CORONARY ARTERY OF NATIVE HEART WITH ANGINA PECTORIS (HCC): ICD-10-CM

## 2017-10-06 DIAGNOSIS — I21.3 ST ELEVATION MYOCARDIAL INFARCTION (STEMI), UNSPECIFIED ARTERY (HCC): Primary | ICD-10-CM

## 2017-10-06 NOTE — MR AVS SNAPSHOT
Visit Information Date & Time Provider Department Dept. Phone Encounter #  
 10/6/2017  2:00 PM Praveena Grady MD [facility] [place] [phone] [id number] Your Appointments 10/10/2017 10:00 AM  
STRESS TEST with CARLYN MTZ  
[facility] (TIMOTHY SCHEDULING) Appt Note: reg stress test per dr Breann Kraus [place] [address] [facility]  
[phone]  
  
   
 [address] [facility] [address] [place] [address]  
  
    
 11/9/2017  9:40 AM  
ESTABLISHED PATIENT with Praveena Grady MD  
[facility] ([facility]) Appt Note: 1 mo fu  
 [address] [place] [address] [facility] [facility]  
[address] [place] [address] [facility] [address] Stret Upcoming Health Maintenance Date Due Hepatitis C Screening 1960 Pneumococcal 19-64 Medium Risk (1 of 1 - PPSV23) 3/25/1980 DTaP/Tdap/Td series (1 - Tdap) 3/25/1982 FOBT Q 1 YEAR AGE 50-75 3/25/2011 INFLUENZA AGE 9 TO ADULT 8/1/2017 Allergies as of 10/6/2017  Review Complete On: 10/6/2017 By: Praveena Grady MD  
  
 Severity Noted Reaction Type Reactions Ampicillin  10/01/2017    Other (comments) \"spots throughout body\" Current Immunizations  Never Reviewed No immunizations on file. Not reviewed this visit You Were Diagnosed With   
  
 Codes Comments ST elevation myocardial infarction (STEMI), unspecified artery ([facility]    -  Primary ICD-10-CM: I21.3 ICD-9-CM: 410.90 Coronary artery disease involving native coronary artery of native heart with angina pectoris ([facility]     ICD-10-CM: I25.119 ICD-9-CM: 414.01, 413.9 Vitals BP Resp Height(growth percentile) Weight(growth percentile) SpO2 BMI  
 120/80 (BP 1 Location: Left arm, BP Patient Position: Sitting) 16 5' 10\" (1.778 m) 192 lb (87.1 kg) 99% 27.55 kg/m2 Smoking Status Light Tobacco Smoker BMI and BSA Data Body Mass Index Body Surface Area  
 27.55 kg/m 2 2.07 m 2 Preferred Pharmacy [facility] Phone [facility] [place] [phone] Your Updated Medication List  
  
   
This list is accurate as of: 10/6/17  2:42 PM.  Always use your most recent med list.  
  
  
  
  
 aspirin 81 mg chewable tablet Take 1 Tab by mouth daily. atorvastatin 80 mg tablet Commonly known as:  LIPITOR Take 1 Tab by mouth nightly. clopidogrel 75 mg Tab Commonly known as:  PLAVIX Take 1 Tab by mouth daily. isosorbide mononitrate ER 30 mg tablet Commonly known as:  IMDUR Take 1 Tab by mouth daily. lisinopril 5 mg tablet Commonly known as:  Estefania November Take 1 Tab by mouth daily. metoprolol tartrate 25 mg tablet Commonly known as:  LOPRESSOR Take 0.5 Tabs by mouth two (2) times a day. We Performed the Following REFERRAL TO CARDIAC REHAB [YJG620 Custom] To-Do List   
 10/06/2017 ECG:  STRESS TEST CARDIAC Referral Information Referral ID Referred By Referred To  
  
 [id number] Augusto STEWART Not Available Visits Status Start Date End Date 1 New Request 10/6/17 10/6/18 If your referral has a status of pending review or denied, additional information will be sent to support the outcome of this decision. Referral ID Referred By Referred To  
 [id number] Augusto STEWART Not Available Visits Status Start Date End Date 1 New Request 10/6/17 10/6/18 If your referral has a status of pending review or denied, additional information will be sent to support the outcome of this decision. Introducing [facility]! Denia Albarran introduces [employer] patient portal. Now you can access parts of your medical record, email your doctor's office, and request medication refills online.    
 
1. In your internet browser, go to [email or web address] 2. Click on the First Time User? Click Here link in the Sign In box. You will see the New Member Sign Up page. 3. Enter your [employer] Access Code exactly as it appears below. You will not need to use this code after youve completed the sign-up process. If you do not sign up before the expiration date, you must request a new code. · [employer] Access Code: [email or web address] Expires: 12/31/2017  2:10 PM 
 
4. Enter the last four digits of your Social Security Number (xxxx) and Date of Birth (mm/dd/yyyy) as indicated and click Submit. You will be taken to the next sign-up page. 5. Create a DistalMotiont ID. This will be your [employer] login ID and cannot be changed, so think of one that is secure and easy to remember. 6. Create a [employer] password. You can change your password at any time. 7. Enter your Password Reset Question and Answer. This can be used at a later time if you forget your password. 8. Enter your e-mail address. You will receive e-mail notification when new information is available in [address] 9. Click Sign Up. You can now view and download portions of your medical record. 10. Click the Download Summary menu link to download a portable copy of your medical information. If you have questions, please visit the Frequently Asked Questions section of the [employer] website. Remember, [employer] is NOT to be used for urgent needs. For medical emergencies, dial 911. Now available from your iPhone and Android! Please provide this summary of care documentation to your next provider. Your primary care clinician is listed as Herson Romo. If you have any questions after today's visit, please call [phone].

## 2017-10-06 NOTE — PROGRESS NOTES
Office Follow-up    NAME: Mustapha Ferrara   :  1960  MRM:  [id number]    Date:  10/6/2017            Assessment:     Problem List  Date Reviewed: 10/6/2017          Codes Class Noted    Coronary artery disease involving native coronary artery ICD-10-CM: I25.10  ICD-9-CM: 414.01  10/2/2017        STEMI (ST elevation myocardial infarction) [facility]) ICD-10-CM: I21.3  ICD-9-CM: 410.90  10/1/2017                 Plan:     1. CAD/ST elevation MI inferior wall/RCA stent/residual diagonal and circumflex disease: Stable. No symptoms. Continue aspirin, Plavix, high-dose Lipitor, Imdur, lisinopril and Lopressor. 2. Hypertension: Blood pressure at the time of admission was very high. He is not known to have high blood pressure. Currently he is tolerating Lopressor, lisinopril, Imdur as a guideline treatment for CAD. Subjective:     Mustapha Ferrara, a 65y.o. year-old who presents for followup recent hospital discharge with ST elevation MI with ANNE of the RCAX2. At home he has not been having any symptoms of chest pain, shortness of breath, lightheadedness or dizziness. He has been mobile at home but has not responded return to his work. He is able to tolerate all the medications without any significant side effects. He continues to have mild cough.         Exam:     Physical Exam:  Visit Vitals    /80 (BP 1 Location: Left arm, BP Patient Position: Sitting)    Resp 16    Ht 5' 10\" (1.778 m)    Wt 192 lb (87.1 kg)    SpO2 99%    BMI 27.55 kg/m2     General appearance - alert, well appearing, and in no distress  Mental status - affect appropriate to mood  Eyes - sclera anicteric, moist mucous membranes  Neck - supple, no significant adenopathy  Chest - clear to auscultation, no wheezes, rales or rhonchi  Heart - normal rate, regular rhythm, normal S1, S2, no murmurs, rubs, clicks or gallops  Abdomen - soft, nontender, nondistended, no masses or organomegaly  Extremities - peripheral pulses normal, no pedal edema  Skin - normal coloration  no rashes    Medications:     Current Outpatient Prescriptions   Medication Sig    aspirin 81 mg chewable tablet Take 1 Tab by mouth daily.  atorvastatin (LIPITOR) 80 mg tablet Take 1 Tab by mouth nightly.  clopidogrel (PLAVIX) 75 mg tab Take 1 Tab by mouth daily.  isosorbide mononitrate ER (IMDUR) 30 mg tablet Take 1 Tab by mouth daily.  lisinopril (PRINIVIL, ZESTRIL) 5 mg tablet Take 1 Tab by mouth daily.  metoprolol tartrate (LOPRESSOR) 25 mg tablet Take 0.5 Tabs by mouth two (2) times a day. No current facility-administered medications for this visit. Diagnostic Data Review:       10/1/17: CATH- LM: mod, LAD: mild-mod;D1: p60%, OM2:m60%: Ramus p80%, RCA: p100%; L-> R collaterals distally, PDA: m70%  ---- s/p ANNE (Resolute 3x22, 2x15) -> RCA    10/2/2017: ECHO- LVEF 5-60% No WMA. Lab Review:     Lab Results   Component Value Date/Time    Cholesterol, total 151 10/02/2017 05:22 AM    HDL Cholesterol 34 10/02/2017 05:22 AM    LDL, calculated 85.4 10/02/2017 05:22 AM    Triglyceride 158 10/02/2017 05:22 AM    CHOL/HDL Ratio 4.4 10/02/2017 05:22 AM     Lab Results   Component Value Date/Time    Creatinine (POC) 1.4 10/01/2017 06:59 PM    Creatinine 1.23 10/03/2017 12:14 AM     Lab Results   Component Value Date/Time    BUN 17 10/03/2017 12:14 AM    BUN (POC) 17 10/01/2017 06:59 PM     Lab Results   Component Value Date/Time    Potassium 4.1 10/03/2017 12:14 AM     Lab Results   Component Value Date/Time    Hemoglobin A1c 5.9 10/02/2017 05:22 AM     Lab Results   Component Value Date/Time    Hemoglobin (POC) 12.9 10/01/2017 06:59 PM    HGB 11.4 10/02/2017 05:22 AM     Lab Results   Component Value Date/Time    PLATELET 340 05/27/5608 05:22 AM     No results for input(s): CPK, CKMB, TROIQ in the last 72 hours. No lab exists for component: CKQMB, CPKMB             ___________________________________________________    Cesia Vasquez.  Sophia Hooker MD, [facility] [place]

## 2017-10-06 NOTE — CARDIO/PULMONARY
Telephone encounter:  Cardiac rehab. Received phone machine message from pt regarding low BP. Attempted to call pt back today both home and cell with no answer. Left message on his answering machine regard S/S of low BP and to contact Dr. Berenice Min office with any concerns.

## 2017-10-10 ENCOUNTER — CLINICAL SUPPORT (OUTPATIENT)
Dept: CARDIOLOGY CLINIC | Age: 57
End: 2017-10-10

## 2017-10-10 DIAGNOSIS — I25.119 CORONARY ARTERY DISEASE INVOLVING NATIVE CORONARY ARTERY OF NATIVE HEART WITH ANGINA PECTORIS (HCC): ICD-10-CM

## 2017-10-10 DIAGNOSIS — I21.3 ST ELEVATION MYOCARDIAL INFARCTION (STEMI), UNSPECIFIED ARTERY (HCC): ICD-10-CM

## 2017-10-10 NOTE — PROGRESS NOTES
Explained procedure to patient, monitoring EKG/HR/BP, walking on treadmill,  and risks/discomforts. All concerns and questions addressed. Consent obtained. See scanned report. Dr. Tatianna Johnson ordered and Dr. Tatianna Johnson read study. ID verified per protocol. Patient did state he was \"getting over a head cold\". Pt was able to walk to 9.9 METS, stopped due to overall fatigue and dyspnea. ST depression was noted during exercise and returned to baseline in recovery. Pt denied Chest pain throughout test. Pt did complain in recovery of \"chest discomfort that feels like a chest cold\" that lasted <1min. At 7 min into recovery pt  reported no symptoms at completion of protocol. \"

## 2017-10-16 ENCOUNTER — TELEPHONE (OUTPATIENT)
Dept: CARDIOLOGY CLINIC | Age: 57
End: 2017-10-16

## 2017-10-16 NOTE — TELEPHONE ENCOUNTER
Discussed stress test results. He had inferolateral ST depression at peak and during recovery. Advised him that he can go for cardiac rehab. On day to day activities I asked him to limit to mild to moderate exertion. NO strenuous activities. He voiced understanding. Hitesh Anand MD, [facility] [place]

## 2017-10-18 ENCOUNTER — TELEPHONE (OUTPATIENT)
Dept: CARDIOLOGY CLINIC | Age: 57
End: 2017-10-18

## 2017-10-18 NOTE — TELEPHONE ENCOUNTER
Tiara with cardiac rehab would like the stress test results sent to her, but I didn't see the final result in the system yet.  She also needs the pre, post and peak EKG   Phone: [phone]  Fax: [phone]

## 2017-10-24 NOTE — TELEPHONE ENCOUNTER
Faxed unofficial copy d/t delay in transcription. Will fax signed/corrected copy once completed. Called and spoke with Lisa Crawford. Informed of situation.

## 2017-11-09 ENCOUNTER — OFFICE VISIT (OUTPATIENT)
Dept: CARDIOLOGY CLINIC | Age: 57
End: 2017-11-09

## 2017-11-09 VITALS
RESPIRATION RATE: 16 BRPM | DIASTOLIC BLOOD PRESSURE: 64 MMHG | HEIGHT: 70 IN | BODY MASS INDEX: 28.06 KG/M2 | WEIGHT: 196 LBS | OXYGEN SATURATION: 98 % | HEART RATE: 64 BPM | SYSTOLIC BLOOD PRESSURE: 116 MMHG

## 2017-11-09 DIAGNOSIS — I25.10 CORONARY ARTERY DISEASE INVOLVING NATIVE HEART WITHOUT ANGINA PECTORIS, UNSPECIFIED VESSEL OR LESION TYPE: Primary | ICD-10-CM

## 2017-11-09 DIAGNOSIS — I10 ESSENTIAL HYPERTENSION: ICD-10-CM

## 2017-11-09 NOTE — PROGRESS NOTES
Office Follow-up    NAME: Gisela Cormier   :  1960  MRM:  [id number]    Date:  2017            Assessment:     Problem List  Date Reviewed: 2017          Codes Class Noted    Coronary artery disease involving native coronary artery ICD-10-CM: I25.10  ICD-9-CM: 414.01  10/2/2017        STEMI (ST elevation myocardial infarction) [facility]) ICD-10-CM: I21.3  ICD-9-CM: 410.90  10/1/2017                 Plan:     1. CAD/ST elevation inferior MI/RCA stent/residual diagonal and circumflex disease: Stable. No symptoms. Continue cardiac rehab. Continue aspirin, Plavix, high-dose Lipitor, Imdur, lisinopril and Lopressor. We will be checking his FLP today. 2. Hypertension: Blood pressure is controlled. Continue Lopressor and lisinopril. 3. Follow-up with me in 4 months. CBC, CMP, hemoglobin A1c, FLP ordered for today. Subjective:     Gisela Cormier, a 65y.o. year-old who presents for followup. He has known history of inferior ST elevation MI on 2017 and received an RCA ANNE stent. He has significant disease in the LAD, diagonal, ramus, left circumflex with no significant targets. He is being medically managed. His going to cardiac rehab at [facility].  He denies any complaints of chest pain or shortness of breath. He is able to tolerate modest activities. He is back working at the [employer] in [employer].     Exam:     Physical Exam:  Visit Vitals    /64 (BP 1 Location: Left arm, BP Patient Position: Sitting)    Pulse 64    Resp 16    Ht 5' 10\" (1.778 m)    Wt 196 lb (88.9 kg)    SpO2 98%    BMI 28.12 kg/m2     General appearance - alert, well appearing, and in no distress  Mental status - affect appropriate to mood  Eyes - sclera anicteric, moist mucous membranes  Neck - supple, no significant adenopathy  Chest - clear to auscultation, no wheezes, rales or rhonchi  Heart - normal rate, regular rhythm, normal S1, S2, no murmurs, rubs, clicks or gallops  Abdomen - soft, nontender, nondistended, no masses or organomegaly  Extremities - peripheral pulses normal, no pedal edema  Skin - normal coloration  no rashes    Medications:     Current Outpatient Prescriptions   Medication Sig    aspirin 81 mg chewable tablet Take 1 Tab by mouth daily.  atorvastatin (LIPITOR) 80 mg tablet Take 1 Tab by mouth nightly.  clopidogrel (PLAVIX) 75 mg tab Take 1 Tab by mouth daily.  isosorbide mononitrate ER (IMDUR) 30 mg tablet Take 1 Tab by mouth daily.  lisinopril (PRINIVIL, ZESTRIL) 5 mg tablet Take 1 Tab by mouth daily.  metoprolol tartrate (LOPRESSOR) 25 mg tablet Take 0.5 Tabs by mouth two (2) times a day. No current facility-administered medications for this visit. Diagnostic Data Review:       10/10/17: STRESS- Average exercise tolerance. No angina. Ischemic stress test with stress-induced ischemic changes in infolateral walls. 10/2/2017: ECHO- LVEF 5-60% No WMA.     10/1/17: CATH- LM: mod, LAD: mild-mod;D1: p60%, OM2:m60%: Ramus p80%, RCA: p100%; L-> R collaterals distally, PDA: m70%  ---- s/p ANNE (Resolute 3x22, 2x15) -> RCA      Lab Review:     Lab Results   Component Value Date/Time    Cholesterol, total 151 10/02/2017 05:22 AM    HDL Cholesterol 34 10/02/2017 05:22 AM    LDL, calculated 85.4 10/02/2017 05:22 AM    Triglyceride 158 10/02/2017 05:22 AM    CHOL/HDL Ratio 4.4 10/02/2017 05:22 AM     Lab Results   Component Value Date/Time    Creatinine (POC) 1.4 10/01/2017 06:59 PM    Creatinine 1.23 10/03/2017 12:14 AM     Lab Results   Component Value Date/Time    BUN 17 10/03/2017 12:14 AM    BUN (POC) 17 10/01/2017 06:59 PM     Lab Results   Component Value Date/Time    Potassium 4.1 10/03/2017 12:14 AM     Lab Results   Component Value Date/Time    Hemoglobin A1c 5.9 10/02/2017 05:22 AM     Lab Results   Component Value Date/Time    Hemoglobin (POC) 12.9 10/01/2017 06:59 PM    HGB 11.4 10/02/2017 05:22 AM     Lab Results   Component Value Date/Time    PLATELET 112 68/96/2323 05:22 AM     No results for input(s): CPK, CKMB, TROIQ in the last 72 hours. No lab exists for component: KOBI, EDIE             ___________________________________________________    Delta Carlton.  Cristian Lawrence MD, [facility] [place]

## 2017-11-09 NOTE — MR AVS SNAPSHOT
Visit Information Date & Time Provider Department Dept. Phone Encounter #  
 11/9/2017  9:40 AM Fabien Tierney MD [facility] [place] [phone] [id number] Follow-up Instructions Follow-up and Disposition History Upcoming Health Maintenance Date Due Hepatitis C Screening 1960 Pneumococcal 19-64 Medium Risk (1 of 1 - PPSV23) 3/25/1980 DTaP/Tdap/Td series (1 - Tdap) 3/25/1982 FOBT Q 1 YEAR AGE 50-75 3/25/2011 Influenza Age 5 to Adult 8/1/2017 Allergies as of 11/9/2017  Review Complete On: 11/9/2017 By: Fabien Tierney MD  
  
 Severity Noted Reaction Type Reactions Ampicillin  10/01/2017    Other (comments) \"spots throughout body\" Current Immunizations  Never Reviewed No immunizations on file. Not reviewed this visit You Were Diagnosed With   
  
 Codes Comments Coronary artery disease involving native heart without angina pectoris, unspecified vessel or lesion type    -  Primary ICD-10-CM: I25.10 ICD-9-CM: 414.01 Essential hypertension     ICD-10-CM: I10 
ICD-9-CM: 401.9 Vitals BP Pulse Resp Height(growth percentile) Weight(growth percentile) SpO2  
 116/64 (BP 1 Location: Left arm, BP Patient Position: Sitting) 64 16 5' 10\" (1.778 m) 196 lb (88.9 kg) 98% BMI Smoking Status 28.12 kg/m2 Former Smoker BMI and BSA Data Body Mass Index Body Surface Area  
 28.12 kg/m 2 2.1 m 2 Preferred Pharmacy Pharmacy Name Phone [address] [phone] Your Updated Medication List  
  
   
This list is accurate as of: 11/9/17 10:18 AM.  Always use your most recent med list.  
  
  
  
  
 aspirin 81 mg chewable tablet Take 1 Tab by mouth daily. atorvastatin 80 mg tablet Commonly known as:  LIPITOR Take 1 Tab by mouth nightly. clopidogrel 75 mg Tab Commonly known as:  PLAVIX Take 1 Tab by mouth daily. isosorbide mononitrate ER 30 mg tablet Commonly known as:  IMDUR Take 1 Tab by mouth daily. lisinopril 5 mg tablet Commonly known as:  Olayinka Economy Take 1 Tab by mouth daily. metoprolol tartrate 25 mg tablet Commonly known as:  LOPRESSOR Take 0.5 Tabs by mouth two (2) times a day. We Performed the Following CBC W/O DIFF [93336 CPT(R)] HEMOGLOBIN A1C WITH EAG [04716 CPT(R)] LIPID PANEL [74636 CPT(R)] METABOLIC PANEL, COMPREHENSIVE [65374 CPT(R)] Introducing [address]! Julianne Ly introduces [employer] patient portal. Now you can access parts of your medical record, email your doctor's office, and request medication refills online. 1. In your internet browser, go to [email or web address] 2. Click on the First Time User? Click Here link in the Sign In box. You will see the New Member Sign Up page. 3. Enter your [employer] Access Code exactly as it appears below. You will not need to use this code after youve completed the sign-up process. If you do not sign up before the expiration date, you must request a new code. · [employer] Access Code: [email or web address] Expires: 12/31/2017  1:10 PM 
 
4. Enter the last four digits of your Social Security Number (xxxx) and Date of Birth (mm/dd/yyyy) as indicated and click Submit. You will be taken to the next sign-up page. 5. Create a [employer] ID. This will be your [employer] login ID and cannot be changed, so think of one that is secure and easy to remember. 6. Create a [employer] password. You can change your password at any time. 7. Enter your Password Reset Question and Answer. This can be used at a later time if you forget your password. 8. Enter your e-mail address. You will receive e-mail notification when new information is available in [address] 9. Click Sign Up. You can now view and download portions of your medical record. 10. Click the Download Summary menu link to download a portable copy of your medical information. If you have questions, please visit the Frequently Asked Questions section of the [employer] website. Remember, [employer] is NOT to be used for urgent needs. For medical emergencies, dial 911. Now available from your iPhone and Android! Please provide this summary of care documentation to your next provider. Your primary care clinician is listed as Herson Romo. If you have any questions after today's visit, please call [phone].

## 2017-11-09 NOTE — PROGRESS NOTES
Chief Complaint   Patient presents with    Follow-up     1 month    Coronary Artery Disease    Other     STEMI/Stent     Visit Vitals    /64 (BP 1 Location: Left arm, BP Patient Position: Sitting)    Pulse 64    Resp 16    Ht 5' 10\" (1.778 m)    Wt 196 lb (88.9 kg)    SpO2 98%    BMI 28.12 kg/m2     Pt presents in office today without complaint.

## 2017-11-10 LAB
ALBUMIN SERPL-MCNC: 4.3 G/DL (ref 3.5–5.5)
ALBUMIN/GLOB SERPL: 1.7 {RATIO} (ref 1.2–2.2)
ALP SERPL-CCNC: 127 IU/L (ref 39–117)
ALT SERPL-CCNC: 21 IU/L (ref 0–44)
AST SERPL-CCNC: 15 IU/L (ref 0–40)
BILIRUB SERPL-MCNC: 0.4 MG/DL (ref 0–1.2)
BUN SERPL-MCNC: 19 MG/DL (ref 6–24)
BUN/CREAT SERPL: 15 (ref 9–20)
CALCIUM SERPL-MCNC: 9.5 MG/DL (ref 8.7–10.2)
CHLORIDE SERPL-SCNC: 103 MMOL/L (ref 96–106)
CHOLEST SERPL-MCNC: 109 MG/DL (ref 100–199)
CO2 SERPL-SCNC: 26 MMOL/L (ref 18–29)
CREAT SERPL-MCNC: 1.26 MG/DL (ref 0.76–1.27)
ERYTHROCYTE [DISTWIDTH] IN BLOOD BY AUTOMATED COUNT: 13.8 % (ref 12.3–15.4)
EST. AVERAGE GLUCOSE BLD GHB EST-MCNC: 114 MG/DL
GFR SERPLBLD CREATININE-BSD FMLA CKD-EPI: 63 ML/MIN/1.73
GFR SERPLBLD CREATININE-BSD FMLA CKD-EPI: 73 ML/MIN/1.73
GLOBULIN SER CALC-MCNC: 2.5 G/DL (ref 1.5–4.5)
GLUCOSE SERPL-MCNC: 131 MG/DL (ref 65–99)
HBA1C MFR BLD: 5.6 % (ref 4.8–5.6)
HCT VFR BLD AUTO: 34.2 % (ref 37.5–51)
HDLC SERPL-MCNC: 34 MG/DL
HGB BLD-MCNC: 11.7 G/DL (ref 12.6–17.7)
INTERPRETATION, 910389: NORMAL
LDLC SERPL CALC-MCNC: 49 MG/DL (ref 0–99)
MCH RBC QN AUTO: 29.9 PG (ref 26.6–33)
MCHC RBC AUTO-ENTMCNC: 34.2 G/DL (ref 31.5–35.7)
MCV RBC AUTO: 88 FL (ref 79–97)
PLATELET # BLD AUTO: 177 X10E3/UL (ref 150–379)
POTASSIUM SERPL-SCNC: 4.8 MMOL/L (ref 3.5–5.2)
PROT SERPL-MCNC: 6.8 G/DL (ref 6–8.5)
RBC # BLD AUTO: 3.91 X10E6/UL (ref 4.14–5.8)
SODIUM SERPL-SCNC: 144 MMOL/L (ref 134–144)
TRIGL SERPL-MCNC: 129 MG/DL (ref 0–149)
VLDLC SERPL CALC-MCNC: 26 MG/DL (ref 5–40)
WBC # BLD AUTO: 4.8 X10E3/UL (ref 3.4–10.8)

## 2017-11-10 NOTE — PROGRESS NOTES
Pls notify>> results look normal and better compared to prior results. BS is elevated but normal for random as the sample was not fasting.

## 2017-11-14 ENCOUNTER — TELEPHONE (OUTPATIENT)
Dept: CARDIOLOGY CLINIC | Age: 57
End: 2017-11-14

## 2017-11-14 NOTE — TELEPHONE ENCOUNTER
Call placed to discuss lab results with pt per VO of Dr. Mirella Ramríez. LVM on personal VM of normal and improved results.

## 2017-11-28 RX ORDER — ATORVASTATIN CALCIUM 80 MG/1
80 TABLET, FILM COATED ORAL
Qty: 30 TAB | Refills: 4 | Status: SHIPPED | OUTPATIENT
Start: 2017-11-28 | End: 2018-02-01 | Stop reason: SDUPTHER

## 2018-01-25 RX ORDER — ISOSORBIDE MONONITRATE 30 MG/1
30 TABLET, EXTENDED RELEASE ORAL DAILY
Qty: 30 TAB | Refills: 3 | Status: SHIPPED | OUTPATIENT
Start: 2018-01-25 | End: 2018-04-04 | Stop reason: SDUPTHER

## 2018-01-25 RX ORDER — LISINOPRIL 5 MG/1
5 TABLET ORAL DAILY
Qty: 30 TAB | Refills: 3 | Status: SHIPPED | OUTPATIENT
Start: 2018-01-25 | End: 2018-04-04 | Stop reason: SDUPTHER

## 2018-02-01 RX ORDER — ATORVASTATIN CALCIUM 80 MG/1
80 TABLET, FILM COATED ORAL
Qty: 90 TAB | Refills: 1 | Status: SHIPPED | OUTPATIENT
Start: 2018-02-01 | End: 2018-08-26 | Stop reason: SDUPTHER

## 2018-03-15 ENCOUNTER — OFFICE VISIT (OUTPATIENT)
Dept: CARDIOLOGY CLINIC | Age: 58
End: 2018-03-15

## 2018-03-15 VITALS
DIASTOLIC BLOOD PRESSURE: 80 MMHG | WEIGHT: 201.4 LBS | HEART RATE: 66 BPM | BODY MASS INDEX: 28.83 KG/M2 | OXYGEN SATURATION: 99 % | SYSTOLIC BLOOD PRESSURE: 140 MMHG | HEIGHT: 70 IN

## 2018-03-15 DIAGNOSIS — I21.3 ST ELEVATION MYOCARDIAL INFARCTION (STEMI), UNSPECIFIED ARTERY (HCC): Primary | ICD-10-CM

## 2018-03-15 DIAGNOSIS — I25.119 CORONARY ARTERY DISEASE INVOLVING NATIVE CORONARY ARTERY OF NATIVE HEART WITH ANGINA PECTORIS (HCC): ICD-10-CM

## 2018-03-15 DIAGNOSIS — I25.119 CORONARY ARTERY DISEASE INVOLVING NATIVE CORONARY ARTERY OF NATIVE HEART WITH ANGINA PECTORIS (HCC): Primary | ICD-10-CM

## 2018-03-15 DIAGNOSIS — I21.3 ST ELEVATION MYOCARDIAL INFARCTION (STEMI), UNSPECIFIED ARTERY (HCC): ICD-10-CM

## 2018-03-15 DIAGNOSIS — I10 ESSENTIAL HYPERTENSION: ICD-10-CM

## 2018-03-15 DIAGNOSIS — E78.5 DYSLIPIDEMIA (HIGH LDL; LOW HDL): ICD-10-CM

## 2018-03-15 NOTE — PROGRESS NOTES
Office Follow-up    NAME: Marilyn Maddox   :  1960  MRM:  [id number]    Date:  3/15/2018            Assessment:     Problem List  Date Reviewed: 3/15/2018          Codes Class Noted    Coronary artery disease involving native coronary artery ICD-10-CM: I25.10  ICD-9-CM: 414.01  10/2/2017        STEMI (ST elevation myocardial infarction) [facility]) ICD-10-CM: I21.3  ICD-9-CM: 410.90  10/1/2017                 Plan:     1. CAD status post ST elevation MI in the inferior walls, RCA stent with residual diagonal and circumflex disease as well as left main and LAD moderate disease. He is stable without any significant symptoms. Last stress test did not demonstrate any anterior wall ischemia. Continue current medications including aspirin Plavix. Reassess CAD at 3 year anniversary of his MI. In 2018 we will plan to do a exercise stress nuclear study. 2. Hypertension: Blood pressure is better controlled. Continue dietary restriction of sodium to less than 2000 mg per day. Continue metoprolol as well as lisinopril at current dosages. 3. Dyslipidemia: Continue statin at Lipitor 80 mg p.o. daily. Reassess FLP in 6 months. 4. See me again in 6 months with CBC, CMP, FLP, TSH. Subjective:     Marilyn Maddox, a 65y.o. year-old who presents for followup. He has known prior history of CAD with ST elevation MI back in 2017 status post RCA stent. He has residual disease in diagonal, left main moderate, LAD. He underwent a stress test after his PCI and demonstrated ST depressions in the inferolateral leads but no ischemic changes in the anterior leads. He is doing well without any significant symptoms of chest pain, exertional shortness of breath, lightheadedness, or dizziness. He is active with daily walks in the afternoon at lunchtime with his colleague. He is currently not actively exercising however he went through 6 weeks of cardiac rehab without any problem.   He checks his blood pressure at home and his systolic blood pressure usually ranged between 120 and 130. His blood pressure in the office today slightly elevated at 140/80. This is likely due to white coat hypertension. Exam:     Physical Exam:  Visit Vitals    /80 (BP 1 Location: Left arm, BP Patient Position: Sitting)    Pulse 66    Ht 5' 10\" (1.778 m)    Wt 201 lb 6.4 oz (91.4 kg)    SpO2 99%    BMI 28.9 kg/m2     General appearance - alert, well appearing, and in no distress  Mental status - affect appropriate to mood  Eyes - sclera anicteric, moist mucous membranes  Neck - supple, no significant adenopathy  Chest - clear to auscultation, no wheezes, rales or rhonchi  Heart - normal rate, regular rhythm, normal S1, S2, no murmurs, rubs, clicks or gallops  Abdomen - soft, nontender, nondistended, no masses or organomegaly  Extremities - peripheral pulses normal, no pedal edema  Skin - normal coloration  no rashes    Medications:     Current Outpatient Prescriptions   Medication Sig    atorvastatin (LIPITOR) 80 mg tablet Take 1 Tab by mouth nightly.  isosorbide mononitrate ER (IMDUR) 30 mg tablet Take 1 Tab by mouth daily.  lisinopril (PRINIVIL, ZESTRIL) 5 mg tablet Take 1 Tab by mouth daily.  aspirin 81 mg chewable tablet Take 1 Tab by mouth daily.  clopidogrel (PLAVIX) 75 mg tab Take 1 Tab by mouth daily.  metoprolol tartrate (LOPRESSOR) 25 mg tablet Take 0.5 Tabs by mouth two (2) times a day. No current facility-administered medications for this visit. Diagnostic Data Review:     EKG:    10/10/17: STRESS- Average exercise tolerance. No angina. Ischemic stress test with stress-induced ischemic changes in infolateral walls. 10/2/2017: ECHO- LVEF 5-60% No WMA.     10/1/17: CATH- LM: mod, LAD: mild-mod;D1: p60%, OM2:m60%: Ramus p80%, RCA: p100%; L-> R collaterals distally, PDA: m70%  ---- s/p ANNE (Resolute 3x22, 2x15) -> RCA      Lab Review:     Lab Results   Component Value Date/Time    Cholesterol, total 109 11/09/2017 12:15 PM    HDL Cholesterol 34 (L) 11/09/2017 12:15 PM    LDL, calculated 49 11/09/2017 12:15 PM    Triglyceride 129 11/09/2017 12:15 PM    CHOL/HDL Ratio 4.4 10/02/2017 05:22 AM     Lab Results   Component Value Date/Time    Creatinine (POC) 1.4 (H) 10/01/2017 06:59 PM    Creatinine 1.26 11/09/2017 12:15 PM     Lab Results   Component Value Date/Time    BUN 19 11/09/2017 12:15 PM    BUN (POC) 17 10/01/2017 06:59 PM     Lab Results   Component Value Date/Time    Potassium 4.8 11/09/2017 12:15 PM     Lab Results   Component Value Date/Time    Hemoglobin A1c 5.6 11/09/2017 12:15 PM     Lab Results   Component Value Date/Time    Hemoglobin (POC) 12.9 10/01/2017 06:59 PM    HGB 11.7 (L) 11/09/2017 12:15 PM     Lab Results   Component Value Date/Time    PLATELET 446 99/37/9572 12:15 PM     No results for input(s): CPK, CKMB, TROIQ in the last 72 hours. No lab exists for component: CKQMB, CPKMB             ___________________________________________________    Flavia Mccormick.  Montez Angelucci, MD, [facility] [place]

## 2018-03-15 NOTE — MR AVS SNAPSHOT
[address] [facility] [address] [facility] 
[phone] Patient: Denver Noel MRN: [id number] :1960 Visit Information Date & Time Provider Department Dept. Phone Encounter #  
 3/15/2018  9:20 AM Shavonne Phan MD [facility] Christelchris Correa [phone] [id number] Follow-up Instructions Follow-up and Disposition History Your Appointments 10/12/2018  9:00 AM  
NUCLEAR MEDICINE with NUCLEARPAIGE  
[facility] (TIMOTHY SCHEDULING) Appt Note: 1 day s-card per dr Immanuel Dowling dx  ht 5,10 wt 201 ov at [facility] 600 [address] [facility]  
[phone]  
  
   
 [address] [address]  
  
    
 10/12/2018  1:00 PM  
ESTABLISHED PATIENT with Shavonne Phan MD  
[address] ([address]) Appt Note: 1 day s-card per dr Immanuel Dowling dx  ht 5,10 wt 201 ov at [place] [address] [address] [facility]  
[address] [facility] [address] [address] Upcoming Health Maintenance Date Due Hepatitis C Screening 1960 DTaP/Tdap/Td series (1 - Tdap) 3/25/1982 FOBT Q 1 YEAR AGE 50-75 3/25/2011 Influenza Age 5 to Adult 2017 Allergies as of 3/15/2018  Review Complete On: 3/15/2018 By: Shavonne Phan MD  
  
 Severity Noted Reaction Type Reactions Ampicillin  10/01/2017    Other (comments) \"spots throughout body\" Current Immunizations  Never Reviewed No immunizations on file. Not reviewed this visit You Were Diagnosed With   
  
 Codes Comments Coronary artery disease involving native coronary artery of native heart with angina pectoris ([facility] [address]    -  Primary ICD-10-CM: I25.119 ICD-9-CM: 414.01, 413.9 ST elevation myocardial infarction (STEMI), unspecified artery ([facility]     ICD-10-CM: I21.3 ICD-9-CM: 410.90 Essential hypertension     ICD-10-CM: I10 
ICD-9-CM: 401.9 Dyslipidemia (high LDL; low HDL)     ICD-10-CM: E78.4 ICD-9-CM: 272.4 Vitals BP Pulse Height(growth percentile) Weight(growth percentile) SpO2 BMI  
 140/80 (BP 1 Location: Left arm, BP Patient Position: Sitting) 66 5' 10\" (1.778 m) 201 lb 6.4 oz (91.4 kg) 99% 28.9 kg/m2 Smoking Status Former Smoker Vitals History BMI and BSA Data Body Mass Index Body Surface Area  
 28.9 kg/m 2 2.12 m 2 Preferred Pharmacy Pharmacy Name Phone [facility] [place] [phone] Your Updated Medication List  
  
   
This list is accurate as of 3/15/18 10:59 AM.  Always use your most recent med list.  
  
  
  
  
 aspirin 81 mg chewable tablet Take 1 Tab by mouth daily. atorvastatin 80 mg tablet Commonly known as:  LIPITOR Take 1 Tab by mouth nightly. clopidogrel 75 mg Tab Commonly known as:  PLAVIX Take 1 Tab by mouth daily. isosorbide mononitrate ER 30 mg tablet Commonly known as:  IMDUR Take 1 Tab by mouth daily. lisinopril 5 mg tablet Commonly known as:  Alcario Antonio Take 1 Tab by mouth daily. metoprolol tartrate 25 mg tablet Commonly known as:  LOPRESSOR Take 0.5 Tabs by mouth two (2) times a day. Patient Instructions See Dr. Sofía Rob in 6 months. Check CBC, CMP, FLP, HbA1C, TSH, Vit D levels in 6 months. Introducing [facility]! Nelida Alvarez introduces [employer] patient portal. Now you can access parts of your medical record, email your doctor's office, and request medication refills online. 1. In your internet browser, go to [email or web address] 2. Click on the First Time User? Click Here link in the Sign In box. You will see the New Member Sign Up page. 3. Enter your [employer] Access Code exactly as it appears below.  You will not need to use this code after youve completed the sign-up process. If you do not sign up before the expiration date, you must request a new code. · [employer] Access Code: [email or web address] Expires: 6/13/2018 10:59 AM 
 
4. Enter the last four digits of your Social Security Number (xxxx) and Date of Birth (mm/dd/yyyy) as indicated and click Submit. You will be taken to the next sign-up page. 5. Create a [employer] ID. This will be your [employer] login ID and cannot be changed, so think of one that is secure and easy to remember. 6. Create a [employer] password. You can change your password at any time. 7. Enter your Password Reset Question and Answer. This can be used at a later time if you forget your password. 8. Enter your e-mail address. You will receive e-mail notification when new information is available in [address] 9. Click Sign Up. You can now view and download portions of your medical record. 10. Click the Download Summary menu link to download a portable copy of your medical information. If you have questions, please visit the Frequently Asked Questions section of the [employer] website. Remember, [employer] is NOT to be used for urgent needs. For medical emergencies, dial 911. Now available from your iPhone and Android! Please provide this summary of care documentation to your next provider. Your primary care clinician is listed as Herson Romo. If you have any questions after today's visit, please call [phone].

## 2018-05-30 RX ORDER — METOPROLOL TARTRATE 25 MG/1
TABLET, FILM COATED ORAL
Qty: 60 TAB | Refills: 3 | Status: SHIPPED | OUTPATIENT
Start: 2018-05-30 | End: 2019-02-02 | Stop reason: SDUPTHER

## 2018-08-03 NOTE — PROGRESS NOTES
1320: 6 minute walk completed with patient. /86 at the 3 minute seth. /77 at end of walk. Patient with no complaint of SOB or chest pain. Notified Khalida BURRIS .    3694: EKG complete. Alan Cates NP notified. Statement Selected

## 2018-08-26 RX ORDER — ATORVASTATIN CALCIUM 80 MG/1
TABLET, FILM COATED ORAL
Qty: 90 TAB | Refills: 1 | Status: SHIPPED | OUTPATIENT
Start: 2018-08-26 | End: 2018-12-06 | Stop reason: SDUPTHER

## 2018-09-20 RX ORDER — CLOPIDOGREL BISULFATE 75 MG/1
TABLET ORAL
Qty: 30 TAB | Refills: 11 | Status: SHIPPED | OUTPATIENT
Start: 2018-09-20 | End: 2019-08-23 | Stop reason: SDUPTHER

## 2018-09-20 NOTE — TELEPHONE ENCOUNTER
Pt is calling in regards to requesting a refill for the selected medication. Pt stated he is running out and he would like to have it before he goes on vacation.    Pharmacy confirmed   Phone# [phone]

## 2018-10-09 LAB
25(OH)D3+25(OH)D2 SERPL-MCNC: 35.2 NG/ML (ref 30–100)
ALBUMIN SERPL-MCNC: 4.6 G/DL (ref 3.5–5.5)
ALBUMIN/GLOB SERPL: 2.4 {RATIO} (ref 1.2–2.2)
ALP SERPL-CCNC: 142 IU/L (ref 39–117)
ALT SERPL-CCNC: 13 IU/L (ref 0–44)
AST SERPL-CCNC: 14 IU/L (ref 0–40)
BILIRUB SERPL-MCNC: 0.8 MG/DL (ref 0–1.2)
BUN SERPL-MCNC: 16 MG/DL (ref 6–24)
BUN/CREAT SERPL: 13 (ref 9–20)
CALCIUM SERPL-MCNC: 9.1 MG/DL (ref 8.7–10.2)
CHLORIDE SERPL-SCNC: 107 MMOL/L (ref 96–106)
CHOLEST SERPL-MCNC: 109 MG/DL (ref 100–199)
CO2 SERPL-SCNC: 24 MMOL/L (ref 20–29)
CREAT SERPL-MCNC: 1.27 MG/DL (ref 0.76–1.27)
ERYTHROCYTE [DISTWIDTH] IN BLOOD BY AUTOMATED COUNT: 13.7 % (ref 12.3–15.4)
GLOBULIN SER CALC-MCNC: 1.9 G/DL (ref 1.5–4.5)
GLUCOSE SERPL-MCNC: 111 MG/DL (ref 65–99)
HBA1C MFR BLD: 5.6 % (ref 4.8–5.6)
HCT VFR BLD AUTO: 33.5 % (ref 37.5–51)
HDLC SERPL-MCNC: 32 MG/DL
HGB BLD-MCNC: 11.4 G/DL (ref 13–17.7)
INTERPRETATION, 910389: NORMAL
LDLC SERPL CALC-MCNC: 55 MG/DL (ref 0–99)
MCH RBC QN AUTO: 29.8 PG (ref 26.6–33)
MCHC RBC AUTO-ENTMCNC: 34 G/DL (ref 31.5–35.7)
MCV RBC AUTO: 88 FL (ref 79–97)
PLATELET # BLD AUTO: 170 X10E3/UL (ref 150–379)
POTASSIUM SERPL-SCNC: 4.7 MMOL/L (ref 3.5–5.2)
PROT SERPL-MCNC: 6.5 G/DL (ref 6–8.5)
RBC # BLD AUTO: 3.82 X10E6/UL (ref 4.14–5.8)
SODIUM SERPL-SCNC: 145 MMOL/L (ref 134–144)
TRIGL SERPL-MCNC: 109 MG/DL (ref 0–149)
TSH SERPL DL<=0.005 MIU/L-ACNC: 2.91 UIU/ML (ref 0.45–4.5)
VLDLC SERPL CALC-MCNC: 22 MG/DL (ref 5–40)
WBC # BLD AUTO: 3.8 X10E3/UL (ref 3.4–10.8)

## 2018-10-12 ENCOUNTER — CLINICAL SUPPORT (OUTPATIENT)
Dept: CARDIOLOGY CLINIC | Age: 58
End: 2018-10-12

## 2018-10-12 ENCOUNTER — OFFICE VISIT (OUTPATIENT)
Dept: CARDIOLOGY CLINIC | Age: 58
End: 2018-10-12

## 2018-10-12 VITALS
HEART RATE: 57 BPM | SYSTOLIC BLOOD PRESSURE: 126 MMHG | HEIGHT: 70 IN | DIASTOLIC BLOOD PRESSURE: 74 MMHG | BODY MASS INDEX: 28.77 KG/M2 | WEIGHT: 201 LBS | RESPIRATION RATE: 16 BRPM | OXYGEN SATURATION: 98 %

## 2018-10-12 DIAGNOSIS — E78.5 DYSLIPIDEMIA (HIGH LDL; LOW HDL): ICD-10-CM

## 2018-10-12 DIAGNOSIS — I25.119 CORONARY ARTERY DISEASE INVOLVING NATIVE CORONARY ARTERY OF NATIVE HEART WITH ANGINA PECTORIS (HCC): Primary | ICD-10-CM

## 2018-10-12 DIAGNOSIS — I10 ESSENTIAL HYPERTENSION: ICD-10-CM

## 2018-10-12 NOTE — PROGRESS NOTES
Office Follow-up    NAME: Char Kennedy   :  1960  MRM:  [id number]    Date:  10/12/2018            Assessment:     Problem List  Date Reviewed: 10/12/2018          Codes Class Noted    Coronary artery disease involving native coronary artery ICD-10-CM: I25.10  ICD-9-CM: 414.01  10/2/2017        STEMI (ST elevation myocardial infarction) [facility]) ICD-10-CM: I21.3  ICD-9-CM: 410.90  10/1/2017                 Plan:     1. CAD status post ST elevation MI in the inferior walls, RCA stent with residual diagonal and circumflex disease as well as left main and LAD moderate disease. He is stable without any significant symptoms. Last stress test did not demonstrate any anterior wall ischemia. He underwent a stress nuclear study today as a follow-up for his residual coronary artery disease. Since we do not have power today we did not or were not able to look at his stress study. I will reviewed this when I am able to and get back to him next week. For the meantime continue current medications including aspirin Plavix. 2. Hypertension: Blood pressure is better controlled. Continue dietary restriction of sodium to less than 2000 mg per day. Continue metoprolol as well as lisinopril at current dosages. 3. Dyslipidemia: Most recent lab work from 2018 were personally reviewed. There is no significant change in lipid profile, CMP. Continue statin at Lipitor 80 mg p.o. daily. 4. See me again in 6 months. Continue Plavix. Subjective:     Char Kennedy, a 63y.o. year-old who presents for followup. He has known prior history of CAD with ST elevation MI back in 2017 status post RCA stent. He has residual disease in diagonal, left main moderate, LAD. He underwent a stress test after his PCI and demonstrated ST depressions in the inferolateral leads but no ischemic changes in the anterior leads.   He is doing well without any significant symptoms of chest pain, exertional shortness of breath, lightheadedness, or dizziness. He is active with daily walks in the afternoon at lunchtime with his colleague. Recently he was on a hike in [facility] and did not have any problems on the trails. He has lost about 11 pounds in the last 6 months since I had last seen him. He is exercising on a daily basis and does 30 minutes of treadmill. EKG at baseline demonstrated normal sinus rhythm with sinus bradycardia, normal axis, normal ST segment, poor R wave progression. Exam:     Physical Exam:  Visit Vitals    /74 (BP Patient Position: Sitting)    Pulse (!) 57    Resp 16    Ht 5' 10\" (1.778 m)    Wt 201 lb (91.2 kg)    SpO2 98%    BMI 28.84 kg/m2     General appearance - alert, well appearing, and in no distress  Mental status - affect appropriate to mood  Eyes - sclera anicteric, moist mucous membranes  Neck - supple, no significant adenopathy  Chest - clear to auscultation, no wheezes, rales or rhonchi  Heart - normal rate, regular rhythm, normal S1, S2, no murmurs, rubs, clicks or gallops  Abdomen - soft, nontender, nondistended, no masses or organomegaly  Extremities - peripheral pulses normal, no pedal edema  Skin - normal coloration  no rashes    Medications:     Current Outpatient Prescriptions   Medication Sig    clopidogrel (PLAVIX) 75 mg tab TAKE 1 TABLET BY MOUTH EVERY DAY    atorvastatin (LIPITOR) 80 mg tablet TAKE 1 TABLET BY MOUTH NIGHTLY    metoprolol tartrate (LOPRESSOR) 25 mg tablet TAKE HALF TABLET BY MOUTH TWICE DAILY    lisinopril (PRINIVIL, ZESTRIL) 5 mg tablet Take 1 Tab by mouth daily.  isosorbide mononitrate ER (IMDUR) 30 mg tablet Take 1 Tab by mouth daily.  aspirin 81 mg chewable tablet Take 1 Tab by mouth daily. No current facility-administered medications for this visit. Diagnostic Data Review:         10/10/17: STRESS- Abnml study. Ischemic stress test with stress-induced ischemic changes in infolateral walls. 10/2/2017: ECHO- LVEF 5-60% No WMA. 10/1/17: CATH- LM: mod, LAD: mild-mod;D1: p60%, OM2:m60%: Ramus p80%, RCA: p100%; L-> R collaterals distally, PDA: m70%  ---- s/p ANNE (Resolute 3x22, 2x15) -> RCA      Lab Review:     Lab Results   Component Value Date/Time    Cholesterol, total 109 10/08/2018 09:41 AM    HDL Cholesterol 32 (L) 10/08/2018 09:41 AM    LDL, calculated 55 10/08/2018 09:41 AM    Triglyceride 109 10/08/2018 09:41 AM    CHOL/HDL Ratio 4.4 10/02/2017 05:22 AM     Lab Results   Component Value Date/Time    Creatinine (POC) 1.4 (H) 10/01/2017 06:59 PM    Creatinine 1.27 10/08/2018 09:41 AM     Lab Results   Component Value Date/Time    BUN 16 10/08/2018 09:41 AM    BUN (POC) 17 10/01/2017 06:59 PM     Lab Results   Component Value Date/Time    Potassium 4.7 10/08/2018 09:41 AM     Lab Results   Component Value Date/Time    Hemoglobin A1c 5.6 10/08/2018 09:41 AM     Lab Results   Component Value Date/Time    Hemoglobin (POC) 12.9 10/01/2017 06:59 PM    HGB 11.4 (L) 10/08/2018 09:41 AM     Lab Results   Component Value Date/Time    PLATELET 252 81/60/8721 09:41 AM     No results for input(s): CPK, CKMB, TROIQ in the last 72 hours. No lab exists for component: CKQMB, CPKMB             ___________________________________________________    Armando Ma.  Karren Leyden, MD, [facility]

## 2018-10-12 NOTE — PROGRESS NOTES
See scanned report. Dr. Elaine Castro ordered study and Dr. Elaine Castro read study. ID verified per protocol. Explained procedure and risks to patient. All concerns and questions ansewered prior to obtaining consent test. Patient developed dyspnea and leg discomfort during test. At 3 minutes in recovery, patient without symptoms or complaints voiced.

## 2018-10-12 NOTE — PROGRESS NOTES
Chief Complaint   Patient presents with    Coronary Artery Disease    Hypertension    Other     STEMI     Visit Vitals    /74 (BP Patient Position: Sitting)    Pulse (!) 57    Resp 16    Ht 5' 10\" (1.778 m)    Wt 201 lb (91.2 kg)    SpO2 98%    BMI 28.84 kg/m2     Pt presents in office w/o cardiac complaint. No recent hospital visits. No refills needed at this time.

## 2018-10-19 NOTE — PROGRESS NOTES
Left vm on cell phone about the test results. Small infarct with mild ischemia of RCA territory. Continue current meds.

## 2018-10-24 ENCOUNTER — TELEPHONE (OUTPATIENT)
Dept: CARDIOLOGY CLINIC | Age: 58
End: 2018-10-24

## 2018-10-26 NOTE — TELEPHONE ENCOUNTER
Return call placed to pt (IDx2). Pt would like to speak with Dr. Marleen Sauceda concerning testing results. Will forward.

## 2018-12-06 RX ORDER — ATORVASTATIN CALCIUM 80 MG/1
TABLET, FILM COATED ORAL
Qty: 90 TAB | Refills: 1 | Status: SHIPPED | OUTPATIENT
Start: 2018-12-06 | End: 2019-09-04 | Stop reason: SDUPTHER

## 2018-12-06 NOTE — TELEPHONE ENCOUNTER
Refill of atorvastatin 80mg daily completed per VO of Dr. Brinda Pastrana.     Last OV: 10/2018  Next OV: 4/2019

## 2019-02-04 RX ORDER — METOPROLOL TARTRATE 25 MG/1
TABLET, FILM COATED ORAL
Qty: 60 TAB | Refills: 3 | Status: SHIPPED | OUTPATIENT
Start: 2019-02-04 | End: 2019-10-02 | Stop reason: SDUPTHER

## 2019-04-12 ENCOUNTER — OFFICE VISIT (OUTPATIENT)
Dept: CARDIOLOGY CLINIC | Age: 59
End: 2019-04-12

## 2019-04-12 VITALS
HEART RATE: 65 BPM | BODY MASS INDEX: 29.78 KG/M2 | HEIGHT: 70 IN | SYSTOLIC BLOOD PRESSURE: 140 MMHG | DIASTOLIC BLOOD PRESSURE: 78 MMHG | OXYGEN SATURATION: 98 % | WEIGHT: 208 LBS

## 2019-04-12 DIAGNOSIS — I10 ESSENTIAL HYPERTENSION: ICD-10-CM

## 2019-04-12 DIAGNOSIS — I25.10 CORONARY ARTERY DISEASE INVOLVING NATIVE HEART WITHOUT ANGINA PECTORIS, UNSPECIFIED VESSEL OR LESION TYPE: ICD-10-CM

## 2019-04-12 DIAGNOSIS — E78.5 DYSLIPIDEMIA (HIGH LDL; LOW HDL): ICD-10-CM

## 2019-04-12 DIAGNOSIS — I25.10 CORONARY ARTERY DISEASE INVOLVING NATIVE CORONARY ARTERY OF NATIVE HEART WITHOUT ANGINA PECTORIS: Primary | ICD-10-CM

## 2019-04-12 NOTE — PROGRESS NOTES
Office Follow-up    NAME: Isabelle James   :  1960  MRM:  [id number]    Date:  2019            Assessment:     Problem List  Date Reviewed: 2019          Codes Class Noted    Coronary artery disease involving native coronary artery ICD-10-CM: I25.10  ICD-9-CM: 414.01  10/2/2017        STEMI (ST elevation myocardial infarction) [facility]) ICD-10-CM: I21.3  ICD-9-CM: 410.90  10/1/2017                 Plan:     1. CAD status post ST elevation MI in the inferior walls, RCA stent with residual diagonal and circumflex disease as well as left main and LAD moderate disease. Stress test last year was negative for ischemia and positive for ST depression in the inferolateral leads. Continue Plavix despite completing 1 year of treatment after the stent placement. Continue high-dose statin, isosorbide, aspirin and metoprolol. 2. Hypertension: Blood pressure is better controlled. Continue dietary restriction of sodium to less than 2000 mg per day. Continue metoprolol as well as lisinopril at current dosages. 3. Dyslipidemia: Most recent lab work from 2018 were personally reviewed. There is no significant change in lipid profile, CMP. Continue statin at Lipitor 80 mg p.o. daily. 4. See me again in 1 year. Subjective:     Isabelle James, a 63y.o. year-old who presents for followup. He has known prior history of CAD with ST elevation MI back in 2017 status post RCA stent. He has residual disease in diagonal, left main moderate, LAD. He underwent a stress test after his PCI and demonstrated ST depressions in the inferolateral leads but no ischemic changes in the anterior leads. Returning today for follow-up. Denies any symptoms of chest pain, shortness of breath, lightheadedness or dizziness. Has been significantly active including 30 minutes of treadmill every day.   On previous visit about 6 months ago he had lost about 11 pounds which he appears to have gained again apparently because of slightly reduced activities in the wintertime. EKG in the office today demonstrated normal sinus. Exam:     Physical Exam:  Visit Vitals  /78 (BP 1 Location: Right arm, BP Patient Position: Sitting)   Pulse 65   Ht 5' 10\" (1.778 m)   Wt 208 lb (94.3 kg)   SpO2 98%   BMI 29.84 kg/m²     General appearance - alert, well appearing, and in no distress  Mental status - affect appropriate to mood  Eyes - sclera anicteric, moist mucous membranes  Neck - supple, no significant adenopathy  Chest - clear to auscultation, no wheezes, rales or rhonchi  Heart - normal rate, regular rhythm, normal S1, S2, no murmurs, rubs, clicks or gallops  Abdomen - soft, nontender, nondistended, no masses or organomegaly  Extremities - peripheral pulses normal, no pedal edema  Skin - normal coloration  no rashes    Medications:     Current Outpatient Medications   Medication Sig    metoprolol tartrate (LOPRESSOR) 25 mg tablet TAKE HALF TABLET BY MOUTH TWICE DAILY    atorvastatin (LIPITOR) 80 mg tablet TAKE 1 TABLET BY MOUTH NIGHTLY    clopidogrel (PLAVIX) 75 mg tab TAKE 1 TABLET BY MOUTH EVERY DAY    lisinopril (PRINIVIL, ZESTRIL) 5 mg tablet Take 1 Tab by mouth daily.  isosorbide mononitrate ER (IMDUR) 30 mg tablet Take 1 Tab by mouth daily.  aspirin 81 mg chewable tablet Take 1 Tab by mouth daily. No current facility-administered medications for this visit. Diagnostic Data Review:         10/10/17: STRESS- Abnml study. Ischemic stress test with stress-induced ischemic changes in infolateral walls. 10/2/2017: ECHO- LVEF 5-60% No WMA.     10/1/17: CATH- LM: mod, LAD: mild-mod;D1: p60%, OM2:m60%: Ramus p80%, RCA: p100%; L-> R collaterals distally, PDA: m70%  ---- s/p ANNE (Resolute 3x22, 2x15) -> RCA      Lab Review:     Lab Results   Component Value Date/Time    Cholesterol, total 109 10/08/2018 09:41 AM    HDL Cholesterol 32 (L) 10/08/2018 09:41 AM    LDL, calculated 55 10/08/2018 09:41 AM    Triglyceride 109 10/08/2018 09:41 AM    CHOL/HDL Ratio 4.4 10/02/2017 05:22 AM     Lab Results   Component Value Date/Time    Creatinine (POC) 1.4 (H) 10/01/2017 06:59 PM    Creatinine 1.27 10/08/2018 09:41 AM     Lab Results   Component Value Date/Time    BUN 16 10/08/2018 09:41 AM    BUN (POC) 17 10/01/2017 06:59 PM     Lab Results   Component Value Date/Time    Potassium 4.7 10/08/2018 09:41 AM     Lab Results   Component Value Date/Time    Hemoglobin A1c 5.6 10/08/2018 09:41 AM     Lab Results   Component Value Date/Time    Hemoglobin (POC) 12.9 10/01/2017 06:59 PM    HGB 11.4 (L) 10/08/2018 09:41 AM     Lab Results   Component Value Date/Time    PLATELET 191 12/51/0219 09:41 AM     No results for input(s): CPK, CKMB, TROIQ in the last 72 hours. No lab exists for component: CKQMB, CPKMB             __________________________________________________Jayme Castro MD, [facility]

## 2019-04-23 RX ORDER — ISOSORBIDE MONONITRATE 30 MG/1
TABLET, EXTENDED RELEASE ORAL
Qty: 90 TAB | Refills: 3 | Status: SHIPPED | OUTPATIENT
Start: 2019-04-23 | End: 2020-04-15

## 2019-04-23 RX ORDER — LISINOPRIL 5 MG/1
TABLET ORAL
Qty: 90 TAB | Refills: 3 | Status: SHIPPED | OUTPATIENT
Start: 2019-04-23 | End: 2020-04-15

## 2019-04-23 NOTE — TELEPHONE ENCOUNTER
Refill of Imdur 30mg daily and Lisinopril 5mg daily  completed per VO of Dr. Duke Weber.     Last OV: 4/2019  Next OV: 4/2020

## 2019-08-27 RX ORDER — CLOPIDOGREL BISULFATE 75 MG/1
TABLET ORAL
Qty: 90 TAB | Refills: 3 | Status: SHIPPED | OUTPATIENT
Start: 2019-08-27 | End: 2020-08-23 | Stop reason: SDUPTHER

## 2019-08-27 NOTE — TELEPHONE ENCOUNTER
Refill of Plavix 75 mg daily completed per VO of Dr. Timothy Jesus.     Last OV: 4/2019  Next OV: 4/2020

## 2019-09-06 RX ORDER — ATORVASTATIN CALCIUM 80 MG/1
TABLET, FILM COATED ORAL
Qty: 90 TAB | Refills: 2 | Status: SHIPPED | OUTPATIENT
Start: 2019-09-06 | End: 2020-06-02

## 2019-09-06 NOTE — TELEPHONE ENCOUNTER
Refill of atorvastatin 80 mg daily completed per VO of Dr. Ar Moya.     Last OV: 4/2019  Next OV: 4/2020

## 2019-10-14 RX ORDER — METOPROLOL TARTRATE 25 MG/1
TABLET, FILM COATED ORAL
Qty: 180 TAB | Refills: 1 | Status: SHIPPED | OUTPATIENT
Start: 2019-10-14 | End: 2020-11-05

## 2019-10-14 NOTE — TELEPHONE ENCOUNTER
Refill of metoprolol 25 mg BID completed per VO of Dr. Tracie Salmeron.     Last OV: 4/2019  Next OV: 4/2020

## 2020-01-27 ENCOUNTER — TELEPHONE (OUTPATIENT)
Dept: CARDIOLOGY CLINIC | Age: 60
End: 2020-01-27

## 2020-01-27 NOTE — TELEPHONE ENCOUNTER
Patient is having oral surgery done with Dr. Napoleon Pearson and needs to hold Plavix prior to procedure. Please advise.

## 2020-01-27 NOTE — TELEPHONE ENCOUNTER
Radha from [facility] is following up on clearance request they sent to #[phone]. Please advise.      Phone: [phone]

## 2020-04-15 ENCOUNTER — VIRTUAL VISIT (OUTPATIENT)
Dept: CARDIOLOGY CLINIC | Age: 60
End: 2020-04-15

## 2020-04-15 DIAGNOSIS — I10 ESSENTIAL HYPERTENSION: ICD-10-CM

## 2020-04-15 DIAGNOSIS — I25.10 CORONARY ARTERY DISEASE INVOLVING NATIVE CORONARY ARTERY OF NATIVE HEART WITHOUT ANGINA PECTORIS: Primary | ICD-10-CM

## 2020-04-15 DIAGNOSIS — E78.5 DYSLIPIDEMIA (HIGH LDL; LOW HDL): ICD-10-CM

## 2020-04-15 RX ORDER — LISINOPRIL 5 MG/1
TABLET ORAL
Qty: 90 TAB | Refills: 3 | Status: SHIPPED | OUTPATIENT
Start: 2020-04-15 | End: 2020-07-23 | Stop reason: SDUPTHER

## 2020-04-15 RX ORDER — ISOSORBIDE MONONITRATE 30 MG/1
TABLET, EXTENDED RELEASE ORAL
Qty: 90 TAB | Refills: 3 | Status: SHIPPED | OUTPATIENT
Start: 2020-04-15 | End: 2021-04-12

## 2020-04-15 NOTE — PROGRESS NOTES
Office Follow-up    NAME: Golden Barnes   :  1960  MRM:  [id number]    Date:  4/15/2020       VIRTUAL VISIT DOCUMENTATION     Pursuant to the emergency declaration under the [address] [place] [address] waiver authority and the [employer] Act, this Virtual  Visit was conducted, with patient's consent, to reduce the patient's risk of exposure to COVID-19 and provide continuity of care for an established patient. Services were provided through a video synchronous discussion virtually to substitute for in-person clinic visit. Assessment:     Problem List  Date Reviewed: 2019          Codes Class Noted    Coronary artery disease involving native coronary artery ICD-10-CM: I25.10  ICD-9-CM: 414.01  10/2/2017        STEMI (ST elevation myocardial infarction) [facility]) ICD-10-CM: I21.3  ICD-9-CM: 410.90  10/1/2017                 Plan:     1. CAD status post ST elevation MI in the inferior walls, RCA stent with residual diagonal and circumflex disease as well as moderate disease in the left main and LAD. Continue aspirin, Plavix, isosorbide mononitrate and high-dose Lipitor. Last stress test in 2018. At some time this year we will do another stress test to reevaluate any new ischemia. 2. Hypertension: Home blood pressure readings are normal.  Continue dietary restriction of sodium to less than 2 g/day. Continue metoprolol and lisinopril. 3. Dyslipidemia: There has not been any recent lab work. Last lab work was in 2018. We will perform CBC, CMP, FLP in 3 months prior to his next visit. Continue Lipitor 80 mg p.o. daily. 4. See me again in 3 months. We discussed the expected course, resolution and complications of the diagnosis(es) in detail. Medication risks, benefits, costs, interactions, and alternatives were discussed as indicated.   I advised him to contact the office if his condition worsens, changes or fails to improve as anticipated. He expressed understanding with the diagnosis(es) and plan      Greater than 20 minutes was spent in direct video patient care, planning and chart review. This visit was conducted using [employer] telemedicine services. ATTENTION:   This medical record was transcribed using an electronic medical records/speech recognition system. Although proofread, it may and can contain electronic, spelling and other errors. Corrections may be executed at a later time. Please feel free to contact us for any clarifications as needed. Subjective:     Yasmeen Fitch, a 62y.o. year-old who was seen by synchronous (real-time) audio-video technology on 3/30/2020. Patient is being seen today for follow-up. I had last seen him in office and April 2019. He has known history of prior CAD with ST elevation MI back in October 2017 and is status post RCA stent. He has residual disease in the diagonal, left main, moderate disease in the LAD. He underwent a stress test in 2018 which demonstrated excellent functional capacity with inferolateral ST changes of ischemia at peak exercise but no significant ischemia on imaging. He had a small inferior wall infarct with mild linda-infarct ischemia with an SDS of 2. His LVEF was preserved. Currently he has no symptoms of chest pain, shortness of breath, lightheadedness, dizziness, presyncope or syncope. He is active however in past 6 days because of the coronavirus lockdown he is unable to do any exercises. He was able to work in his yard without any problems. He has gained slight weight. 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 He monitors his blood pressure at home and takes it daily. His blood pressure today was 130/80. He does not recall his pulse rate.     Exam:     Due to this being a TeleHealth evaluation, many elements of the physical examination are unable to be assessed. General: Well developed, in no acute distress, cooperative and alert  HEENT: Pupils equal/round. No marked JVD visible on video. Respiratory: No audible wheezing, no signs of respiratory distress, lips non cyanotic  Extremities:  No edema  Neuro: A&Ox3, speech clear, no facial droop, answering questions appropriately  Skin: Skin color is normal. No rashes or lesions. Non diaphoretic on visible skin during exam    Medications:     Current Outpatient Medications   Medication Sig    lisinopriL (PRINIVIL, ZESTRIL) 5 mg tablet TAKE 1 TABLET BY MOUTH EVERY DAY    isosorbide mononitrate ER (IMDUR) 30 mg tablet TAKE 1 TABLET BY MOUTH EVERY DAY    metoprolol tartrate (LOPRESSOR) 25 mg tablet TAKE HALF TABLET BY MOUTH TWICE DAILY    atorvastatin (LIPITOR) 80 mg tablet TAKE 1 TABLET BY MOUTH EVERY DAY AT NIGHT    clopidogrel (PLAVIX) 75 mg tab TAKE 1 TABLET BY MOUTH EVERY DAY    aspirin 81 mg chewable tablet Take 1 Tab by mouth daily. No current facility-administered medications for this visit. Diagnostic Data Review:       10/10/17: STRESS- Abnml study. Ischemic stress test with stress-induced ischemic changes in infolateral walls. 10/2/2017: ECHO- LVEF 5-60% No WMA.     10/1/17: CATH- LM: mod, LAD: mild-mod;D1: p60%, OM2:m60%: Ramus p80%, RCA: p100%; L-> R collaterals distally, PDA: m70%  ---- s/p ANNE (Resolute 3x22, 2x15) -> RCA      Lab Review:     Lab Results   Component Value Date/Time    Cholesterol, total 109 10/08/2018 09:41 AM    HDL Cholesterol 32 (L) 10/08/2018 09:41 AM    LDL, calculated 55 10/08/2018 09:41 AM    Triglyceride 109 10/08/2018 09:41 AM    CHOL/HDL Ratio 4.4 10/02/2017 05:22 AM     Lab Results   Component Value Date/Time    Creatinine (POC) 1.4 (H) 10/01/2017 06:59 PM    Creatinine 1.27 10/08/2018 09:41 AM     Lab Results   Component Value Date/Time    BUN 16 10/08/2018 09:41 AM    BUN (POC) 17 10/01/2017 06:59 PM     Lab Results   Component Value Date/Time Potassium 4.7 10/08/2018 09:41 AM     Lab Results   Component Value Date/Time    Hemoglobin A1c 5.6 10/08/2018 09:41 AM     Lab Results   Component Value Date/Time    Hemoglobin (POC) 12.9 10/01/2017 06:59 PM    HGB 11.4 (L) 10/08/2018 09:41 AM     Lab Results   Component Value Date/Time    PLATELET 817 47/78/2901 09:41 AM     No results for input(s): CPK, CKMB, TROIQ in the last 72 hours. No lab exists for component: CKQMB, CPKMB             ___________________________________________________    Rocio .  Beata Maya MD, [facility] [place]

## 2020-06-02 RX ORDER — ATORVASTATIN CALCIUM 80 MG/1
TABLET, FILM COATED ORAL
Qty: 90 TAB | Refills: 2 | Status: SHIPPED | OUTPATIENT
Start: 2020-06-02 | End: 2021-04-01

## 2020-07-23 ENCOUNTER — OFFICE VISIT (OUTPATIENT)
Dept: CARDIOLOGY CLINIC | Age: 60
End: 2020-07-23

## 2020-07-23 VITALS
OXYGEN SATURATION: 97 % | SYSTOLIC BLOOD PRESSURE: 188 MMHG | DIASTOLIC BLOOD PRESSURE: 98 MMHG | BODY MASS INDEX: 30.72 KG/M2 | WEIGHT: 214.6 LBS | HEART RATE: 73 BPM | HEIGHT: 70 IN

## 2020-07-23 DIAGNOSIS — E78.5 DYSLIPIDEMIA: ICD-10-CM

## 2020-07-23 DIAGNOSIS — I25.2 HISTORY OF ST ELEVATION MYOCARDIAL INFARCTION (STEMI): ICD-10-CM

## 2020-07-23 DIAGNOSIS — I10 ESSENTIAL HYPERTENSION: Primary | ICD-10-CM

## 2020-07-23 DIAGNOSIS — I25.10 CORONARY ARTERY DISEASE INVOLVING NATIVE CORONARY ARTERY OF NATIVE HEART WITHOUT ANGINA PECTORIS: ICD-10-CM

## 2020-07-23 RX ORDER — LISINOPRIL 10 MG/1
10 TABLET ORAL DAILY
Qty: 90 TAB | Refills: 0 | Status: SHIPPED | OUTPATIENT
Start: 2020-07-23 | End: 2020-08-06 | Stop reason: SDUPTHER

## 2020-07-23 NOTE — LETTER
7/26/20 Patient: Haley Nance YOB: 1960 Date of Visit: 7/23/2020 Kash Encarnacion MD 
[address] [place] [address] [address] [address] VIA Facsimile: [phone] Dear Kash Encarnacion MD, Thank you for referring Mr. Lorenza Ross to [address] [place] [address] for evaluation. My notes for this consultation are attached. If you have questions, please do not hesitate to call me. I look forward to following your patient along with you.  
 
 
Sincerely, 
 
Indira Barrera NP

## 2020-07-23 NOTE — PROGRESS NOTES
Chief Complaint   Patient presents with    Follow-up    Coronary Artery Disease    Hypertension     Visit Vitals  BP (!) 188/98 (BP 1 Location: Right arm, BP Patient Position: Sitting)   Pulse 73   Ht 5' 10\" (1.778 m)   Wt 214 lb 9.6 oz (97.3 kg)   SpO2 97%   BMI 30.79 kg/m²       Chest pain denied  SOB - denied  Dizziness denied  Swelling/Edema -denied  Recent hospital visit denied

## 2020-07-23 NOTE — PROGRESS NOTES
Office Follow-up      NAME: Cassia Desouza   :  1960  MRM:  [id number]    Date:  2020           Assessment:       ICD-10-CM ICD-9-CM    1. Essential hypertension  I10 401.9    2. Coronary artery disease involving native coronary artery of native heart without angina pectoris  I25.10 414.01 AMB POC EKG ROUTINE W/ 12 LEADS, INTER & REP   3. Dyslipidemia  E78.5 272.4    4. History of ST elevation myocardial infarction (STEMI)  I25.2 412           Plan:     CAD status post ST elevation MI in the inferior walls, RCA stent with residual diagonal and circumflex disease as well as moderate disease in the left main and LAD. Continue aspirin, Plavix, isosorbide mononitrate and high-dose Lipitor . No anginal c/o today. Check CBC. Hypertension: Home BP OK; however, in office measurement severely elevated. Increase lisinopril to 10mg/d. Keep home log. Continue dietary restriction of sodium to less than 2 g/day. Continue metoprolol. Check CMP    Dyslipidemia: check updated lipids. Continue Lipitor 80 mg p.o. daily. F/u in 2 weeks                                                     Subjective:     Patient is being seen today for follow-up; last seen by Dr. Marta Shane 4/15/20 by [facility]. He has known history of prior CAD with ST elevation MI back in 2017 and is status post RCA stent. He has residual disease in the diagonal, left main, moderate disease in the LAD. He underwent a stress test in 2018 which demonstrated excellent functional capacity with inferolateral ST changes of ischemia at peak exercise but no significant ischemia on imaging. He had a small inferior wall infarct with mild linda-infarct ischemia with an SDS of 2. His LVEF was preserved.     Patient denies any exertional chest pain, dyspnea, palpitations, syncope, orthopnea, edema, or paroxysmal nocturnal dyspnea.     He is able to work in his yard without any problems.       He monitors his blood pressure at home and takes it daily; reports routinely 130s-140s/60s-80s. Took all meds as prescribed yesterday and today. Unsure why BP so high in office. Has gained some weight during COVID. Not as active. (Positive findings above)  Constitutional: Negative for fever, chills, and diaphoresis. Respiratory: Negative for cough, hemoptysis, sputum production, shortness of breath and wheezing. Cardiovascular: Negative for chest pain, palpitations, orthopnea, leg swelling and PND. Gastrointestinal: Negative for heartburn, nausea, vomiting, blood in stool and melena. Genitourinary: Negative for dysuria and flank pain. Neurological: Negative for focal weakness, seizures, loss of consciousness  Endo/Heme/Allergies: Negative for abnormal bleeding. Psychiatric/Behavioral: Negative for memory loss. Exam:     Physical Exam:  Visit Vitals  BP (!) 188/98 (BP 1 Location: Right arm, BP Patient Position: Sitting) Comment: has not taken BP meds today   Pulse 73   Ht 5' 10\" (1.778 m)   Wt 214 lb 9.6 oz (97.3 kg)   SpO2 97%   BMI 30.79 kg/m²     Repeat /80. Wt Readings from Last 3 Encounters:   07/23/20 214 lb 9.6 oz (97.3 kg)   04/12/19 208 lb (94.3 kg)   10/12/18 201 lb (91.2 kg)     General - well developed well nourished  Neck - JVP normal, neck suple  Cardiac - normal S1, S2, RRR no murmurs, rubs or gallops. No clicks  Vascular -radials and pedal pulses equal bilateral  Lungs - clear to auscultation bilaterals, no rales, wheezing or rhonchi  Abd - soft nontender, non-distended, +BS  Extremities - no edema, warm, well perfused  Neuro - nonfocal  Psych - normal mood and affect      Medications:     Current Outpatient Medications   Medication Sig    lisinopriL (PRINIVIL, ZESTRIL) 10 mg tablet Take 1 Tab by mouth daily.     atorvastatin (LIPITOR) 80 mg tablet TAKE 1 TABLET BY MOUTH EVERY DAY AT NIGHT    isosorbide mononitrate ER (IMDUR) 30 mg tablet TAKE 1 TABLET BY MOUTH EVERY DAY    metoprolol tartrate (LOPRESSOR) 25 mg tablet TAKE HALF TABLET BY MOUTH TWICE DAILY    clopidogrel (PLAVIX) 75 mg tab TAKE 1 TABLET BY MOUTH EVERY DAY    aspirin 81 mg chewable tablet Take 1 Tab by mouth daily. No current facility-administered medications for this visit. Diagnostic Data Review:     10/10/17: STRESS- Abnml study. Ischemic stress test with stress-induced ischemic changes in infolateral walls. 10/2/2017: ECHO- LVEF 5-60% No WMA.     10/1/17: CATH- LM: mod, LAD: mild-mod;D1: p60%, OM2:m60%: Ramus p80%, RCA: p100%; L-> R collaterals distally, PDA: m70%  ---- s/p ANNE (Resolute 3x22, 2x15) -> RCA      Lab Review:     Lab Results   Component Value Date/Time    Sodium 145 (H) 10/08/2018 09:41 AM    Potassium 4.7 10/08/2018 09:41 AM    Chloride 107 (H) 10/08/2018 09:41 AM    CO2 24 10/08/2018 09:41 AM    Anion gap 10 10/03/2017 12:14 AM    Glucose 111 (H) 10/08/2018 09:41 AM    BUN 16 10/08/2018 09:41 AM    Creatinine 1.27 10/08/2018 09:41 AM    BUN/Creatinine ratio 13 10/08/2018 09:41 AM    GFR est AA 72 10/08/2018 09:41 AM    GFR est non-AA 62 10/08/2018 09:41 AM    Calcium 9.1 10/08/2018 09:41 AM     Lab Results   Component Value Date/Time    WBC 3.8 10/08/2018 09:41 AM    Hemoglobin (POC) 12.9 10/01/2017 06:59 PM    HGB 11.4 (L) 10/08/2018 09:41 AM    Hematocrit (POC) 38 10/01/2017 06:59 PM    HCT 33.5 (L) 10/08/2018 09:41 AM    PLATELET 741 20/19/0222 09:41 AM    MCV 88 10/08/2018 09:41 AM     Lab Results   Component Value Date/Time    Hemoglobin A1c 5.6 10/08/2018 09:41 AM     Lab Results   Component Value Date/Time    Cholesterol, total 109 10/08/2018 09:41 AM    HDL Cholesterol 32 (L) 10/08/2018 09:41 AM    LDL, calculated 55 10/08/2018 09:41 AM    VLDL, calculated 22 10/08/2018 09:41 AM    Triglyceride 109 10/08/2018 09:41 AM    CHOL/HDL Ratio 4.4 10/02/2017 05:22 AM     Lab Results   Component Value Date/Time    TSH 2.910 10/08/2018 09:41 AM            _______________________________________________    BLANCA Otero

## 2020-07-23 NOTE — PATIENT INSTRUCTIONS
Increase your lisinopril to 10mg per day  Keep home BP log 2-3 x per day. Get fasting labs in the next week    See me again in 2 weeks.

## 2020-08-05 LAB
ALBUMIN SERPL-MCNC: 4.4 G/DL (ref 3.8–4.9)
ALBUMIN/GLOB SERPL: 2 {RATIO} (ref 1.2–2.2)
ALP SERPL-CCNC: 134 IU/L (ref 39–117)
ALT SERPL-CCNC: 18 IU/L (ref 0–44)
AST SERPL-CCNC: 13 IU/L (ref 0–40)
BILIRUB SERPL-MCNC: 0.6 MG/DL (ref 0–1.2)
BUN SERPL-MCNC: 16 MG/DL (ref 6–24)
BUN/CREAT SERPL: 13 (ref 9–20)
CALCIUM SERPL-MCNC: 10 MG/DL (ref 8.7–10.2)
CHLORIDE SERPL-SCNC: 103 MMOL/L (ref 96–106)
CHOLEST SERPL-MCNC: 113 MG/DL (ref 100–199)
CO2 SERPL-SCNC: 25 MMOL/L (ref 20–29)
CREAT SERPL-MCNC: 1.23 MG/DL (ref 0.76–1.27)
ERYTHROCYTE [DISTWIDTH] IN BLOOD BY AUTOMATED COUNT: 13.1 % (ref 11.6–15.4)
GLOBULIN SER CALC-MCNC: 2.2 G/DL (ref 1.5–4.5)
GLUCOSE SERPL-MCNC: 83 MG/DL (ref 65–99)
HCT VFR BLD AUTO: 36.3 % (ref 37.5–51)
HDLC SERPL-MCNC: 32 MG/DL
HGB BLD-MCNC: 12.1 G/DL (ref 13–17.7)
INTERPRETATION, 910389: NORMAL
LDLC SERPL CALC-MCNC: 43 MG/DL (ref 0–99)
MCH RBC QN AUTO: 30.2 PG (ref 26.6–33)
MCHC RBC AUTO-ENTMCNC: 33.3 G/DL (ref 31.5–35.7)
MCV RBC AUTO: 91 FL (ref 79–97)
PLATELET # BLD AUTO: 202 X10E3/UL (ref 150–450)
POTASSIUM SERPL-SCNC: 4.1 MMOL/L (ref 3.5–5.2)
PROT SERPL-MCNC: 6.6 G/DL (ref 6–8.5)
RBC # BLD AUTO: 4.01 X10E6/UL (ref 4.14–5.8)
SODIUM SERPL-SCNC: 144 MMOL/L (ref 134–144)
TRIGL SERPL-MCNC: 191 MG/DL (ref 0–149)
VLDLC SERPL CALC-MCNC: 38 MG/DL (ref 5–40)
WBC # BLD AUTO: 5.9 X10E3/UL (ref 3.4–10.8)

## 2020-08-06 ENCOUNTER — OFFICE VISIT (OUTPATIENT)
Dept: CARDIOLOGY CLINIC | Age: 60
End: 2020-08-06
Payer: COMMERCIAL

## 2020-08-06 VITALS
HEART RATE: 72 BPM | BODY MASS INDEX: 29.78 KG/M2 | OXYGEN SATURATION: 97 % | SYSTOLIC BLOOD PRESSURE: 148 MMHG | HEIGHT: 70 IN | WEIGHT: 208 LBS | DIASTOLIC BLOOD PRESSURE: 82 MMHG | RESPIRATION RATE: 18 BRPM

## 2020-08-06 DIAGNOSIS — I10 ESSENTIAL HYPERTENSION: Primary | ICD-10-CM

## 2020-08-06 DIAGNOSIS — E78.5 DYSLIPIDEMIA (HIGH LDL; LOW HDL): ICD-10-CM

## 2020-08-06 DIAGNOSIS — E78.5 DYSLIPIDEMIA: ICD-10-CM

## 2020-08-06 DIAGNOSIS — I25.2 HISTORY OF ST ELEVATION MYOCARDIAL INFARCTION (STEMI): ICD-10-CM

## 2020-08-06 DIAGNOSIS — I25.10 CORONARY ARTERY DISEASE INVOLVING NATIVE CORONARY ARTERY OF NATIVE HEART WITHOUT ANGINA PECTORIS: ICD-10-CM

## 2020-08-06 PROCEDURE — 99213 OFFICE O/P EST LOW 20 MIN: CPT | Performed by: NURSE PRACTITIONER

## 2020-08-06 RX ORDER — LISINOPRIL 5 MG/1
5 TABLET ORAL 2 TIMES DAILY
Qty: 60 TAB | Refills: 0
Start: 2020-08-06 | End: 2020-10-29 | Stop reason: SDUPTHER

## 2020-08-06 RX ORDER — LISINOPRIL 5 MG/1
5 TABLET ORAL DAILY
Qty: 30 TAB | Refills: 0
Start: 2020-08-06 | End: 2020-08-06 | Stop reason: SDUPTHER

## 2020-08-06 NOTE — PROGRESS NOTES
Chief Complaint   Patient presents with    Follow-up    Hypertension    Coronary Artery Disease     Visit Vitals  /82 (BP 1 Location: Left arm, BP Patient Position: Sitting)   Pulse 72   Resp 18   Ht 5' 10\" (1.778 m)   Wt 208 lb (94.3 kg)   SpO2 97%   BMI 29.84 kg/m²     Pt presents in office w/o cardiac complain. He does have a log with him today. He states that he increased his Lisinopril to 10 mg and was dizzy. He has reduced back to Lisinopril 5 mg daily and feels better. No recent hospital visits. May need refills.

## 2020-08-06 NOTE — PATIENT INSTRUCTIONS
Change lisinopril to 5mg twice daily; take evening dose right before bed. If return of dizziness please call me. I can make other changes. Goal BP <135/80 (most of the time)

## 2020-08-06 NOTE — PROGRESS NOTES
Office Follow-up      NAME: Ed Valdez   :  1960  MRM:  [id number]    Date:  2020         Assessment:       ICD-10-CM ICD-9-CM    1. Essential hypertension  I10 401.9    2. Coronary artery disease involving native coronary artery of native heart without angina pectoris  I25.10 414.01    3. Dyslipidemia  E78.5 272.4    4. History of ST elevation myocardial infarction (STEMI)  I25.2 412    5. Dyslipidemia (high LDL; low HDL)  E78.5 272.4           Plan:     CAD status post ST elevation MI in the inferior walls, RCA stent with residual diagonal and circumflex disease as well as moderate disease in the left main and LAD. Continue aspirin, Plavix, isosorbide mononitrate and high-dose Lipitor . No anginal c/o today. Hypertension: Still elevated; dizziness with lisinopril 10mg/d; will try 5mg BID. Keep home log; goal BP <130/80. Continue dietary restriction of sodium to less than 2 g/day. Continue metoprolol. Pt will call if return of dizziness. Dyslipidemia:  Continue Lipitor 80 mg p.o. daily. Discussed increased exercise / weight loss. F/u in 6 months or PRN                                                  Subjective:     Patient is being seen today for follow-up; last seen by Dr. Judit Sanders 4/15/20 by [facility]. He has known history of prior CAD with ST elevation MI back in 2017 and is status post RCA stent. He has residual disease in the diagonal, left main, moderate disease in the LAD. He underwent a stress test in 2018 which demonstrated excellent functional capacity with inferolateral ST changes of ischemia at peak exercise but no significant ischemia on imaging. He had a small inferior wall infarct with mild linda-infarct ischemia with an SDS of 2. His LVEF was preserved.     Patient denies any exertional chest pain, dyspnea, palpitations, syncope, orthopnea, edema, or paroxysmal nocturnal dyspnea.     He is able to work in his yard without any problems.       He monitors his blood pressure at home and takes it daily; reports routinely 130s-150s/70s-80s. Took lisinopril 10mg/d for a few days but went back to 5mg 2/2 dizziness. Had gained some weight during COVID. Not as active. Has lost weight since last visit; working to get active again. (Positive findings above)  Constitutional: Negative for fever, chills, and diaphoresis. Respiratory: Negative for cough, hemoptysis, sputum production, shortness of breath and wheezing. Cardiovascular: Negative for chest pain, palpitations, orthopnea, leg swelling and PND. Gastrointestinal: Negative for heartburn, nausea, vomiting, blood in stool and melena. Genitourinary: Negative for dysuria and flank pain. Neurological: Negative for focal weakness, seizures, loss of consciousness  Endo/Heme/Allergies: Negative for abnormal bleeding. Psychiatric/Behavioral: Negative for memory loss. Exam:     Physical Exam:  Visit Vitals  /82 (BP 1 Location: Left arm, BP Patient Position: Sitting)   Pulse 72   Resp 18   Ht 5' 10\" (1.778 m)   Wt 208 lb (94.3 kg)   SpO2 97%   BMI 29.84 kg/m²     Wt Readings from Last 3 Encounters:   08/06/20 208 lb (94.3 kg)   07/23/20 214 lb 9.6 oz (97.3 kg)   04/12/19 208 lb (94.3 kg)     General - well developed well nourished  Neck - JVP normal, neck suple  Cardiac - normal S1, S2, RRR no murmurs, rubs or gallops. No clicks  Vascular -radials and pedal pulses equal bilateral  Lungs - clear to auscultation bilaterals, no rales, wheezing or rhonchi  Abd - soft nontender, non-distended, +BS  Extremities - no edema, warm, well perfused  Neuro - nonfocal  Psych - normal mood and affect      Medications:     Current Outpatient Medications   Medication Sig    lisinopriL (PRINIVIL, ZESTRIL) 5 mg tablet Take 1 Tab by mouth two (2) times a day.     atorvastatin (LIPITOR) 80 mg tablet TAKE 1 TABLET BY MOUTH EVERY DAY AT NIGHT    isosorbide mononitrate ER (IMDUR) 30 mg tablet TAKE 1 TABLET BY MOUTH EVERY DAY    metoprolol tartrate (LOPRESSOR) 25 mg tablet TAKE HALF TABLET BY MOUTH TWICE DAILY    clopidogrel (PLAVIX) 75 mg tab TAKE 1 TABLET BY MOUTH EVERY DAY    aspirin 81 mg chewable tablet Take 1 Tab by mouth daily. No current facility-administered medications for this visit. Diagnostic Data Review:     10/10/17: STRESS- Abnml study. Ischemic stress test with stress-induced ischemic changes in infolateral walls. 10/2/2017: ECHO- LVEF 5-60% No WMA.     10/1/17: CATH- LM: mod, LAD: mild-mod;D1: p60%, OM2:m60%: Ramus p80%, RCA: p100%; L-> R collaterals distally, PDA: m70%  ---- s/p ANNE (Resolute 3x22, 2x15) -> RCA      Lab Review:     Lab Results   Component Value Date/Time    Sodium 144 08/04/2020 04:04 PM    Potassium 4.1 08/04/2020 04:04 PM    Chloride 103 08/04/2020 04:04 PM    CO2 25 08/04/2020 04:04 PM    Anion gap 10 10/03/2017 12:14 AM    Glucose 83 08/04/2020 04:04 PM    BUN 16 08/04/2020 04:04 PM    Creatinine 1.23 08/04/2020 04:04 PM    BUN/Creatinine ratio 13 08/04/2020 04:04 PM    GFR est AA 74 08/04/2020 04:04 PM    GFR est non-AA 64 08/04/2020 04:04 PM    Calcium 10.0 08/04/2020 04:04 PM     Lab Results   Component Value Date/Time    WBC 5.9 08/04/2020 04:04 PM    Hemoglobin (POC) 12.9 10/01/2017 06:59 PM    HGB 12.1 (L) 08/04/2020 04:04 PM    Hematocrit (POC) 38 10/01/2017 06:59 PM    HCT 36.3 (L) 08/04/2020 04:04 PM    PLATELET 905 06/48/2723 04:04 PM    MCV 91 08/04/2020 04:04 PM     Lab Results   Component Value Date/Time    Hemoglobin A1c 5.6 10/08/2018 09:41 AM     Lab Results   Component Value Date/Time    Cholesterol, total 113 08/04/2020 04:04 PM    HDL Cholesterol 32 (L) 08/04/2020 04:04 PM    LDL, calculated 43 08/04/2020 04:04 PM    VLDL, calculated 38 08/04/2020 04:04 PM    Triglyceride 191 (H) 08/04/2020 04:04 PM    CHOL/HDL Ratio 4.4 10/02/2017 05:22 AM     Lab Results   Component Value Date/Time    TSH 2.910 10/08/2018 09:41 AM _______________________________________________    AnnTietonry Proffer, ANP

## 2020-08-06 NOTE — LETTER
8/6/20 Patient: Asim Dumont YOB: 1960 Date of Visit: 8/6/2020 Solange Quintero MD 
[address] [address] [address] VIA Facsimile: [phone] Dear Solange Quintero MD, Thank you for referring Mr. Charito Ochoa to [address] [place] [address] for evaluation. My notes for this consultation are attached. If you have questions, please do not hesitate to call me. I look forward to following your patient along with you.  
 
 
Sincerely, 
 
José Medina NP

## 2020-08-24 RX ORDER — CLOPIDOGREL BISULFATE 75 MG/1
75 TABLET ORAL DAILY
Qty: 90 TAB | Refills: 3 | Status: SHIPPED | OUTPATIENT
Start: 2020-08-24 | End: 2021-08-27

## 2020-10-23 RX ORDER — METOPROLOL TARTRATE 25 MG/1
TABLET, FILM COATED ORAL
Qty: 90 TAB | Refills: 3 | OUTPATIENT
Start: 2020-10-23

## 2020-11-05 RX ORDER — METOPROLOL TARTRATE 25 MG/1
TABLET, FILM COATED ORAL
Qty: 90 TAB | Refills: 1 | Status: SHIPPED | OUTPATIENT
Start: 2020-11-05 | End: 2021-05-04

## 2020-11-05 NOTE — TELEPHONE ENCOUNTER
Per VO of Dr. Abarca Northern: 7/23/2020    Future Appointments   Date Time Provider David Morin   2/11/2021  8:40 AM Sidra Vasquez MD [facility]       Requested Prescriptions     Pending Prescriptions Disp Refills    metoprolol tartrate (LOPRESSOR) 25 mg tablet [Pharmacy Med Name: METOPROLOL TARTRATE 25 MG TAB] 90 Tab 3     Sig: TAKE 1/2 TABLET BY MOUTH TWICE DAILY

## 2021-02-11 ENCOUNTER — OFFICE VISIT (OUTPATIENT)
Dept: CARDIOLOGY CLINIC | Age: 61
End: 2021-02-11
Payer: COMMERCIAL

## 2021-02-11 VITALS
WEIGHT: 207 LBS | HEART RATE: 77 BPM | HEIGHT: 70 IN | BODY MASS INDEX: 29.63 KG/M2 | DIASTOLIC BLOOD PRESSURE: 90 MMHG | SYSTOLIC BLOOD PRESSURE: 142 MMHG | OXYGEN SATURATION: 98 %

## 2021-02-11 DIAGNOSIS — E78.5 DYSLIPIDEMIA: ICD-10-CM

## 2021-02-11 DIAGNOSIS — I25.10 CORONARY ARTERY DISEASE INVOLVING NATIVE CORONARY ARTERY OF NATIVE HEART WITHOUT ANGINA PECTORIS: Primary | ICD-10-CM

## 2021-02-11 DIAGNOSIS — I25.2 HISTORY OF ST ELEVATION MYOCARDIAL INFARCTION (STEMI): ICD-10-CM

## 2021-02-11 DIAGNOSIS — I10 ESSENTIAL HYPERTENSION: ICD-10-CM

## 2021-02-11 PROCEDURE — 99214 OFFICE O/P EST MOD 30 MIN: CPT | Performed by: INTERNAL MEDICINE

## 2021-02-11 NOTE — PROGRESS NOTES
Office Follow-up    NAME: Fco Middleton   :  1960  MRM:  [id number]    Date:  2021            Assessment:     Problem List  Date Reviewed: 4/15/2020          Codes Class Noted    Coronary artery disease involving native coronary artery ICD-10-CM: I25.10  ICD-9-CM: 414.01  10/2/2017        STEMI (ST elevation myocardial infarction) [facility]) ICD-10-CM: I21.3  ICD-9-CM: 410.90  10/1/2017                 Plan:     1. CAD status post ST elevation MI in the inferior walls, RCA stent with residual diagonal and circumflex disease as well as moderate disease in the left main and LAD. Continue aspirin, Plavix, isosorbide mononitrate and high-dose Lipitor. Last stress test in 2018 did not show ischemia   2. Hypertension: Home blood pressure readings are normal.  Continue dietary restriction of sodium to less than 2 g/day. Continue metoprolol and lisinopril. 3. Dyslipidemia: Last labs from Aug 2020 reviewed. Has low HDL. Continue Lipitor. Advised to drink 1 glass of red wine daily. 4. See me again in 6 months. ATTENTION:   This medical record was transcribed using an electronic medical records/speech recognition system. Although proofread, it may and can contain electronic, spelling and other errors. Corrections may be executed at a later time. Please feel free to contact us for any clarifications as needed. Subjective:     Fco Middleton, a 62y.o. year-old who presents for followup. He has history of CAD, STEMI, Diffuse CAD, HTN, Dyslipidemia. Has currently no symptoms of chest pain, sob, LH or dizziness.      Exam:     Physical Exam:  Visit Vitals  BP (!) 142/90 (BP 1 Location: Left upper arm, BP Patient Position: Sitting, BP Cuff Size: Adult)   Pulse 77   Ht 5' 10\" (1.778 m)   Wt 207 lb (93.9 kg)   SpO2 98%   BMI 29.70 kg/m²     General appearance - alert, well appearing, and in no distress  Mental status - affect appropriate to mood  Eyes - sclera anicteric, moist mucous membranes  Neck - supple, no significant adenopathy  Chest - clear to auscultation, no wheezes, rales or rhonchi  Heart - normal rate, regular rhythm, normal S1, S2, no murmurs, rubs, clicks or gallops  Abdomen - soft, nontender, nondistended, no masses or organomegaly  Extremities - peripheral pulses normal, no pedal edema  Skin - normal coloration  no rashes    Medications:     Current Outpatient Medications   Medication Sig    metoprolol tartrate (LOPRESSOR) 25 mg tablet TAKE 1/2 TABLET BY MOUTH TWICE DAILY    lisinopriL (PRINIVIL, ZESTRIL) 5 mg tablet Take 1 Tab by mouth two (2) times a day.  clopidogreL (PLAVIX) 75 mg tab Take 1 Tab by mouth daily.  atorvastatin (LIPITOR) 80 mg tablet TAKE 1 TABLET BY MOUTH EVERY DAY AT NIGHT    isosorbide mononitrate ER (IMDUR) 30 mg tablet TAKE 1 TABLET BY MOUTH EVERY DAY    aspirin 81 mg chewable tablet Take 1 Tab by mouth daily. No current facility-administered medications for this visit. Diagnostic Data Review:       10/10/17: STRESS- Abnml study. Ischemic stress test with stress-induced ischemic changes in infolateral walls. 10/2/2017: ECHO- LVEF 5-60% No WMA.     10/1/17: CATH- LM: mod, LAD: mild-mod;D1: p60%, OM2:m60%: Ramus p80%, RCA: p100%; L-> R collaterals distally, PDA: m70%  ---- s/p ANNE (Resolute 3x22, 2x15) -> RCA      Lab Review:     Lab Results   Component Value Date/Time    Cholesterol, total 113 08/04/2020 04:04 PM    HDL Cholesterol 32 (L) 08/04/2020 04:04 PM    LDL, calculated 43 08/04/2020 04:04 PM    Triglyceride 191 (H) 08/04/2020 04:04 PM    CHOL/HDL Ratio 4.4 10/02/2017 05:22 AM     Lab Results   Component Value Date/Time    Creatinine (POC) 1.4 (H) 10/01/2017 06:59 PM    Creatinine 1.23 08/04/2020 04:04 PM     Lab Results   Component Value Date/Time    BUN 16 08/04/2020 04:04 PM    BUN (POC) 17 10/01/2017 06:59 PM     Lab Results   Component Value Date/Time    Potassium 4.1 08/04/2020 04:04 PM     Lab Results   Component Value Date/Time Hemoglobin A1c 5.6 10/08/2018 09:41 AM     Lab Results   Component Value Date/Time    Hemoglobin (POC) 12.9 10/01/2017 06:59 PM    HGB 12.1 (L) 08/04/2020 04:04 PM     Lab Results   Component Value Date/Time    PLATELET 163 83/98/2867 04:04 PM     No results for input(s): CPK, CKMB, TROIQ in the last 72 hours. No lab exists for component: CKQMB, CPKMB             ___________________________________________________    Cinthya Hodges.  Isabelle Vincent MD, [facility] [place]

## 2021-02-11 NOTE — PROGRESS NOTES
Darshana Chavez is a 62 y.o. male    Visit Vitals  BP (!) 142/90 (BP 1 Location: Left upper arm, BP Patient Position: Sitting, BP Cuff Size: Adult)   Pulse 77   Ht 5' 10\" (1.778 m)   Wt 207 lb (93.9 kg)   SpO2 98%   BMI 29.70 kg/m²       Chief Complaint   Patient presents with    Hypertension    Coronary Artery Disease    Cholesterol Problem       Chest pain no  SOB no  Dizziness no  Swelling no  Recent hospital visit no  Refills no

## 2021-04-01 RX ORDER — ATORVASTATIN CALCIUM 80 MG/1
TABLET, FILM COATED ORAL
Qty: 90 TAB | Refills: 2 | Status: SHIPPED | OUTPATIENT
Start: 2021-04-01 | End: 2022-04-18 | Stop reason: SDUPTHER

## 2021-04-08 ENCOUNTER — TELEPHONE (OUTPATIENT)
Dept: CARDIOLOGY CLINIC | Age: 61
End: 2021-04-08

## 2021-04-08 NOTE — TELEPHONE ENCOUNTER
Returned call Harriett at  to [place] [address] [facility], informed them Dr. Basil Talbot is out of the office all week. Asked them to fax their medication instruction request to us, and I will have him address it next week.  She agreed

## 2021-04-08 NOTE — TELEPHONE ENCOUNTER
Andrew Gold with [facility] is calling to get clearance to hold Plavix prior to a tooth extraction. Please advise.     Phone #: [phone]  Fax #: [phone]  Thanks

## 2021-04-12 RX ORDER — ISOSORBIDE MONONITRATE 30 MG/1
TABLET, EXTENDED RELEASE ORAL
Qty: 90 TAB | Refills: 3 | Status: SHIPPED | OUTPATIENT
Start: 2021-04-12 | End: 2022-05-09 | Stop reason: SDUPTHER

## 2021-05-04 RX ORDER — METOPROLOL TARTRATE 25 MG/1
TABLET, FILM COATED ORAL
Qty: 90 TAB | Refills: 1 | Status: SHIPPED | OUTPATIENT
Start: 2021-05-04 | End: 2021-11-04

## 2021-05-04 NOTE — TELEPHONE ENCOUNTER
Per VO of Dr. Fara Stanley: 7/23/2020    Future Appointments   Date Time Provider David Morin   8/12/2021  9:20 AM Adriane Vasquez MD [facility]       Requested Prescriptions     Pending Prescriptions Disp Refills    metoprolol tartrate (LOPRESSOR) 25 mg tablet [Pharmacy Med Name: METOPROLOL TARTRATE 25 MG TAB] 90 Tab 1     Sig: TAKE 1/2 TABLET BY MOUTH TWICE DAILY

## 2021-08-27 RX ORDER — CLOPIDOGREL BISULFATE 75 MG/1
TABLET ORAL
Qty: 90 TABLET | Refills: 1 | Status: SHIPPED | OUTPATIENT
Start: 2021-08-27 | End: 2022-01-11

## 2021-08-27 NOTE — TELEPHONE ENCOUNTER
Refill per VO of Dr. Yvette Chamorro  Last appt: 2/11/21  Future Appointments   Date Time Provider David Morin   9/16/2021  8:40 AM Tim Vasquez MD [facility]       Requested Prescriptions     Signed Prescriptions Disp Refills    clopidogreL (PLAVIX) 75 mg tab 90 Tablet 1     Sig: TAKE 1 TABLET BY MOUTH EVERY DAY     Authorizing Provider: Moises Wolf     Ordering User: Hyun Sheikh

## 2021-09-16 ENCOUNTER — OFFICE VISIT (OUTPATIENT)
Dept: CARDIOLOGY CLINIC | Age: 61
End: 2021-09-16
Payer: COMMERCIAL

## 2021-09-16 VITALS
WEIGHT: 215 LBS | SYSTOLIC BLOOD PRESSURE: 130 MMHG | RESPIRATION RATE: 18 BRPM | HEIGHT: 70 IN | HEART RATE: 51 BPM | BODY MASS INDEX: 30.78 KG/M2 | DIASTOLIC BLOOD PRESSURE: 78 MMHG | OXYGEN SATURATION: 98 %

## 2021-09-16 DIAGNOSIS — I25.10 CORONARY ARTERY DISEASE INVOLVING NATIVE CORONARY ARTERY OF NATIVE HEART WITHOUT ANGINA PECTORIS: Primary | ICD-10-CM

## 2021-09-16 DIAGNOSIS — I21.3 ST ELEVATION MYOCARDIAL INFARCTION (STEMI), UNSPECIFIED ARTERY (HCC): ICD-10-CM

## 2021-09-16 PROCEDURE — 99214 OFFICE O/P EST MOD 30 MIN: CPT | Performed by: INTERNAL MEDICINE

## 2021-09-16 NOTE — PROGRESS NOTES
All orders entered per verbal orders of Dr. Daija Meek FLP, CMP, CBC, Vit D levels, TSH. Lab orders given to the pt  Exercise stress Nuclear- CAD, History of STEMI. See Dr. Kenyatta Mantilla in 6 months. Pt was verbally given the following instructions:    Wear comfortable clothing (shorts or pants with a shirt or blouse- no underwire bras) and walking or athletic shoes. Do not eat or drink anything, except water, for at least 4 hours prior to your appointment. Avoid tobacco products for at least 6 hour prior to your test.    Do not drink or eat anything containing caffeine, including but not limited to the following, chocolate, regular and decaffeinated coffee, soft drinks, or tea for at least 24 hours prior to your test.    Do not hold your scheduled Lopressor medication the morning prior to your test. If you are a diabetic, please ask your physician how to adjust your food and insulin prior to your test. Please bring all medications you are currently taking. You will need to inform our office if you are pregnant, nursing, or think you may be pregnant. Your test will be performed on a 1 day protocol. This is determined by your height, weight, and other risk factors. For a 2 day test, please allow for 2 hours in the office each day. For a 1 day test, please allow for 4 hours in the office that day. The radioactive isotope used for your testing is different from any of the dyes that are commonly used in x-ray procedures, and is ordered specially for your test.     Please call to cancel or reschedule your appointment at least 24 hours prior to your scheduled appointment to avoid being billed for the expensive isotope. Pt given the \"green\" instructions sheet, and told to bring it with them to their appointment. The pt verbalized understanding and had no further questions at this time.

## 2021-09-16 NOTE — PROGRESS NOTES
Chief Complaint   Patient presents with    Follow-up     6 mo f/u; CAD, HTN, Chol      Chief Complaint   Patient presents with    Follow-up     6 mo f/u; CAD, HTN, Chol      Visit Vitals  /78   Pulse (!) 51   Resp 18   Ht 5' 10\" (1.778 m)   Wt 215 lb (97.5 kg)   SpO2 98%   BMI 30.85 kg/m²     Chest pain denied   Palpations denied  SOB denied  Dizziness denied  Swelling in hands/feet denied   Recent hospital stays denied

## 2021-09-16 NOTE — PROGRESS NOTES
Office Follow-up    NAME: Asmita Serrano   :  1960  MRM:  [id number]    Date:  2021            Assessment:     Problem List  Date Reviewed: 4/15/2020        Codes Class Noted    Coronary artery disease involving native coronary artery ICD-10-CM: I25.10  ICD-9-CM: 414.01  10/2/2017        STEMI (ST elevation myocardial infarction) [facility]) ICD-10-CM: I21.3  ICD-9-CM: 410.90  10/1/2017                 Plan:     1. CAD status post ST elevation MI in the inferior walls, RCA stent () with residual diagonal and circumflex disease as well as moderate disease in the left main and LAD. Continue aspirin, Plavix, isosorbide mononitrate and high-dose Lipitor. Last stress test in 2018 did not show ischemia. Since he has residual moderate disease in LM and LAD will proceed with a screening exercise stress Nuc as it has been 3 years. 2. Hypertension: Continue dietary restriction of sodium to less than 2 g/day. Continue metoprolol and lisinopril. 3. Dyslipidemia: Last labs from Aug 2020 reviewed. Has low HDL. Continue Lipitor. Advised to drink 1 glass of red wine daily. 4. Bradycardia: Monitor. On Metoprolol   5. JONH- Uses CPAP   6. Check FLP, CMP, CBC, Vit D levels, TSH.   7. See me again in 6 months. He is a  at [employer]. ATTENTION:   This medical record was transcribed using an electronic medical records/speech recognition system. Although proofread, it may and can contain electronic, spelling and other errors. Corrections may be executed at a later time. Please feel free to contact us for any clarifications as needed. Subjective:     Asmita Serraon, a 62y.o. year-old who presents for followup. He has known history of CAD, stimulation MI, diffuse CAD, moderate disease of the LAD and left main. Background history of hypertension dyslipidemia. He is here for follow-up. Denies any symptoms of chest pain, shortness of breath, lightheadedness or dizziness. Remains active and exercises. Has regained some of his weight that he had lost last year. Exam:     Physical Exam:  Visit Vitals  /78   Pulse (!) 51   Resp 18   Ht 5' 10\" (1.778 m)   Wt 215 lb (97.5 kg)   SpO2 98%   BMI 30.85 kg/m²     General appearance - alert, well appearing, and in no distress  Mental status - affect appropriate to mood  Eyes - sclera anicteric, moist mucous membranes  Neck - supple, no significant adenopathy  Chest - clear to auscultation, no wheezes, rales or rhonchi  Heart - normal rate, regular rhythm, normal S1, S2, no murmurs, rubs, clicks or gallops  Abdomen - soft, nontender, nondistended, no masses or organomegaly  Extremities - peripheral pulses normal, no pedal edema  Skin - normal coloration  no rashes    Medications:     Current Outpatient Medications   Medication Sig    clopidogreL (PLAVIX) 75 mg tab TAKE 1 TABLET BY MOUTH EVERY DAY    metoprolol tartrate (LOPRESSOR) 25 mg tablet TAKE 1/2 TABLET BY MOUTH TWICE DAILY    isosorbide mononitrate ER (IMDUR) 30 mg tablet TAKE 1 TABLET BY MOUTH EVERY DAY    atorvastatin (LIPITOR) 80 mg tablet TAKE 1 TABLET BY MOUTH EVERY DAY AT NIGHT    lisinopriL (PRINIVIL, ZESTRIL) 5 mg tablet Take 1 Tab by mouth two (2) times a day.  aspirin 81 mg chewable tablet Take 1 Tab by mouth daily. No current facility-administered medications for this visit. Diagnostic Data Review:       10/10/17: STRESS- Abnml study. Ischemic stress test with stress-induced ischemic changes in infolateral walls. 10/2/2017: ECHO- LVEF 5-60% No WMA.     10/1/17: CATH- LM: mod, LAD: mild-mod;D1: p60%, OM2:m60%: Ramus p80%, RCA: p100%; L-> R collaterals distally, PDA: m70%  ---- s/p ANNE (Resolute 3x22, 2x15) -> RCA      Lab Review:     Lab Results   Component Value Date/Time    Cholesterol, total 113 08/04/2020 04:04 PM    HDL Cholesterol 32 (L) 08/04/2020 04:04 PM    LDL, calculated 43 08/04/2020 04:04 PM    Triglyceride 191 (H) 08/04/2020 04:04 PM CHOL/HDL Ratio 4.4 10/02/2017 05:22 AM     Lab Results   Component Value Date/Time    Creatinine (POC) 1.4 (H) 10/01/2017 06:59 PM    Creatinine 1.23 08/04/2020 04:04 PM     Lab Results   Component Value Date/Time    BUN 16 08/04/2020 04:04 PM    BUN (POC) 17 10/01/2017 06:59 PM     Lab Results   Component Value Date/Time    Potassium 4.1 08/04/2020 04:04 PM     Lab Results   Component Value Date/Time    Hemoglobin A1c 5.6 10/08/2018 09:41 AM     Lab Results   Component Value Date/Time    Hemoglobin (POC) 12.9 10/01/2017 06:59 PM    HGB 12.1 (L) 08/04/2020 04:04 PM     Lab Results   Component Value Date/Time    PLATELET 283 87/38/7912 04:04 PM     No results for input(s): CPK, CKMB, TROIQ in the last 72 hours. No lab exists for component: CKQMB, CPKMB             ___________________________________________________    Jean-Claude Barley.  Sheron Aburto MD, [facility] [place]

## 2021-09-16 NOTE — PATIENT INSTRUCTIONS
Check FLP, CMP, CBC, Vit D levels, TSH. Exercise stress Nuclear- CAD, History of STEMI. See Dr. Kelli Xiong in 6 months.

## 2021-10-14 LAB
25(OH)D3+25(OH)D2 SERPL-MCNC: 48.5 NG/ML (ref 30–100)
ALBUMIN SERPL-MCNC: 4.6 G/DL (ref 3.8–4.9)
ALBUMIN/GLOB SERPL: 2.1 {RATIO} (ref 1.2–2.2)
ALP SERPL-CCNC: 157 IU/L (ref 44–121)
ALT SERPL-CCNC: 28 IU/L (ref 0–44)
AST SERPL-CCNC: 22 IU/L (ref 0–40)
BILIRUB SERPL-MCNC: 0.7 MG/DL (ref 0–1.2)
BUN SERPL-MCNC: 17 MG/DL (ref 8–27)
BUN/CREAT SERPL: 13 (ref 10–24)
CALCIUM SERPL-MCNC: 9.2 MG/DL (ref 8.6–10.2)
CHLORIDE SERPL-SCNC: 105 MMOL/L (ref 96–106)
CHOLEST SERPL-MCNC: 112 MG/DL (ref 100–199)
CO2 SERPL-SCNC: 26 MMOL/L (ref 20–29)
CREAT SERPL-MCNC: 1.26 MG/DL (ref 0.76–1.27)
ERYTHROCYTE [DISTWIDTH] IN BLOOD BY AUTOMATED COUNT: 12.6 % (ref 11.6–15.4)
GLOBULIN SER CALC-MCNC: 2.2 G/DL (ref 1.5–4.5)
GLUCOSE SERPL-MCNC: 124 MG/DL (ref 65–99)
HCT VFR BLD AUTO: 36.5 % (ref 37.5–51)
HDLC SERPL-MCNC: 29 MG/DL
HGB BLD-MCNC: 12.9 G/DL (ref 13–17.7)
IMP & REVIEW OF LAB RESULTS: NORMAL
LDLC SERPL CALC-MCNC: 58 MG/DL (ref 0–99)
MCH RBC QN AUTO: 31.2 PG (ref 26.6–33)
MCHC RBC AUTO-ENTMCNC: 35.3 G/DL (ref 31.5–35.7)
MCV RBC AUTO: 88 FL (ref 79–97)
PLATELET # BLD AUTO: 187 X10E3/UL (ref 150–450)
POTASSIUM SERPL-SCNC: 4.4 MMOL/L (ref 3.5–5.2)
PROT SERPL-MCNC: 6.8 G/DL (ref 6–8.5)
RBC # BLD AUTO: 4.13 X10E6/UL (ref 4.14–5.8)
SODIUM SERPL-SCNC: 144 MMOL/L (ref 134–144)
TRIGL SERPL-MCNC: 141 MG/DL (ref 0–149)
TSH SERPL DL<=0.005 MIU/L-ACNC: 2.46 UIU/ML (ref 0.45–4.5)
VLDLC SERPL CALC-MCNC: 25 MG/DL (ref 5–40)
WBC # BLD AUTO: 5.1 X10E3/UL (ref 3.4–10.8)

## 2021-10-18 NOTE — PROGRESS NOTES
Pls notify>>   Other than blood sugar slightly elevated, all other labs are normal.   Continue exercise, diet and proper sleep.

## 2021-10-21 ENCOUNTER — ANCILLARY PROCEDURE (OUTPATIENT)
Dept: CARDIOLOGY CLINIC | Age: 61
End: 2021-10-21
Payer: COMMERCIAL

## 2021-10-21 VITALS — BODY MASS INDEX: 30.78 KG/M2 | HEIGHT: 70 IN | WEIGHT: 215 LBS

## 2021-10-21 DIAGNOSIS — I25.10 CORONARY ARTERY DISEASE INVOLVING NATIVE CORONARY ARTERY OF NATIVE HEART WITHOUT ANGINA PECTORIS: ICD-10-CM

## 2021-10-21 DIAGNOSIS — I21.3 ST ELEVATION MYOCARDIAL INFARCTION (STEMI), UNSPECIFIED ARTERY (HCC): ICD-10-CM

## 2021-10-21 PROCEDURE — 78452 HT MUSCLE IMAGE SPECT MULT: CPT | Performed by: INTERNAL MEDICINE

## 2021-10-21 PROCEDURE — 93015 CV STRESS TEST SUPVJ I&R: CPT | Performed by: INTERNAL MEDICINE

## 2021-10-21 PROCEDURE — A9500 TC99M SESTAMIBI: HCPCS | Performed by: INTERNAL MEDICINE

## 2021-10-21 RX ORDER — TETRAKIS(2-METHOXYISOBUTYLISOCYANIDE)COPPER(I) TETRAFLUOROBORATE 1 MG/ML
25.8 INJECTION, POWDER, LYOPHILIZED, FOR SOLUTION INTRAVENOUS ONCE
Status: COMPLETED | OUTPATIENT
Start: 2021-10-21 | End: 2021-10-21

## 2021-10-21 RX ORDER — TETRAKIS(2-METHOXYISOBUTYLISOCYANIDE)COPPER(I) TETRAFLUOROBORATE 1 MG/ML
7.8 INJECTION, POWDER, LYOPHILIZED, FOR SOLUTION INTRAVENOUS ONCE
Status: COMPLETED | OUTPATIENT
Start: 2021-10-21 | End: 2021-10-21

## 2021-10-21 RX ADMIN — TETRAKIS(2-METHOXYISOBUTYLISOCYANIDE)COPPER(I) TETRAFLUOROBORATE 25.8 MILLICURIE: 1 INJECTION, POWDER, LYOPHILIZED, FOR SOLUTION INTRAVENOUS at 09:50

## 2021-10-21 RX ADMIN — TETRAKIS(2-METHOXYISOBUTYLISOCYANIDE)COPPER(I) TETRAFLUOROBORATE 7.8 MILLICURIE: 1 INJECTION, POWDER, LYOPHILIZED, FOR SOLUTION INTRAVENOUS at 08:15

## 2021-10-22 ENCOUNTER — DOCUMENTATION ONLY (OUTPATIENT)
Dept: CARDIOLOGY CLINIC | Age: 61
End: 2021-10-22

## 2021-10-22 LAB
STRESS ANGINA INDEX: 0
STRESS BASELINE DIAS BP: 70 MMHG
STRESS BASELINE HR: 64 BPM
STRESS BASELINE SYS BP: 148 MMHG
STRESS ESTIMATED WORKLOAD: 11.6 METS
STRESS EXERCISE DUR MIN: NORMAL
STRESS O2 SAT PEAK: 98 %
STRESS O2 SAT REST: 98 %
STRESS PEAK DIAS BP: 80 MMHG
STRESS PEAK SYS BP: 212 MMHG
STRESS PERCENT HR ACHIEVED: 97 %
STRESS POST PEAK HR: 155 BPM
STRESS RATE PRESSURE PRODUCT: NORMAL BPM*MMHG
STRESS ST DEPRESSION: 4 MM
STRESS ST ELEVATION: 1 MM
STRESS TARGET HR: 160 BPM

## 2021-10-22 NOTE — PROGRESS NOTES
Per Dr. Evelyn العراقي, \"Discussed at length. Continue to watch BP. Wt loss is going to be the key.    \"

## 2021-11-04 RX ORDER — METOPROLOL TARTRATE 25 MG/1
TABLET, FILM COATED ORAL
Qty: 90 TABLET | Refills: 4 | Status: SHIPPED | OUTPATIENT
Start: 2021-11-04 | End: 2022-09-28 | Stop reason: SDUPTHER

## 2021-11-04 NOTE — TELEPHONE ENCOUNTER
Refill per VO of Dr. Leonor Wang  Last appt: 7/23/2020  No future appointments.     Requested Prescriptions     Pending Prescriptions Disp Refills    metoprolol tartrate (LOPRESSOR) 25 mg tablet [Pharmacy Med Name: METOPROLOL TARTRATE 25 MG TAB] 90 Tablet 1     Sig: TAKE 1/2 TABLET BY MOUTH TWICE DAILY

## 2021-12-21 RX ORDER — LISINOPRIL 5 MG/1
TABLET ORAL
Qty: 180 TABLET | Refills: 3 | Status: SHIPPED | OUTPATIENT
Start: 2021-12-21 | End: 2021-12-29 | Stop reason: SDUPTHER

## 2021-12-21 NOTE — TELEPHONE ENCOUNTER
Refill per VO of Dr. Dontae Guillen  Last appt: 7/23/2020  No future appointments.     Requested Prescriptions     Pending Prescriptions Disp Refills    lisinopriL (PRINIVIL, ZESTRIL) 5 mg tablet [Pharmacy Med Name: LISINOPRIL 5 MG TABLET] 180 Tablet 1     Sig: TAKE 1 TABLET BY MOUTH TWICE A DAY

## 2021-12-29 ENCOUNTER — TELEPHONE (OUTPATIENT)
Dept: CARDIOLOGY CLINIC | Age: 61
End: 2021-12-29

## 2021-12-29 RX ORDER — LISINOPRIL 10 MG/1
5 TABLET ORAL 2 TIMES DAILY
Qty: 45 TABLET | Refills: 4 | Status: SHIPPED | OUTPATIENT
Start: 2021-12-29 | End: 2022-08-29

## 2021-12-29 NOTE — TELEPHONE ENCOUNTER
Per Delilah Zamora:  Note received from [facility] that lisinopril 5mg BID not covered by insurance. Vesna Ludwig you please change to 10mg - take 1/2 tab BID?       Please let pt know that tab dosage is being changed.  Thanks!       Unable to reach pt. Message left \"for a call back    Refill per VO of Delilah Zamora NP  Last appt: 9/16/2021  No future appointments. Requested Prescriptions     Pending Prescriptions Disp Refills    lisinopriL (PRINIVIL, ZESTRIL) 10 mg tablet 45 Tablet 4     Sig: Take 0.5 Tablets by mouth two (2) times a day.

## 2021-12-30 ENCOUNTER — TELEPHONE (OUTPATIENT)
Dept: CARDIOLOGY CLINIC | Age: 61
End: 2021-12-30

## 2021-12-30 NOTE — TELEPHONE ENCOUNTER
Spoke with the pt's spouse, Luis Kirkland, identified the pt with name and . I informed her of the change in medication due to insurance coverage. Luis Kirkland    expressed understanding and agreement. Pt has no further questions or concerns at this time.

## 2022-01-11 RX ORDER — CLOPIDOGREL BISULFATE 75 MG/1
TABLET ORAL
Qty: 90 TABLET | Refills: 3 | Status: SHIPPED | OUTPATIENT
Start: 2022-01-11 | End: 2022-10-18 | Stop reason: SDUPTHER

## 2022-01-11 NOTE — TELEPHONE ENCOUNTER
Refill per VO of Dr. Rosa Unger  Last appt: 7/23/2020  No future appointments.     Requested Prescriptions     Pending Prescriptions Disp Refills    clopidogreL (PLAVIX) 75 mg tab [Pharmacy Med Name: CLOPIDOGREL 75 MG TABLET] 90 Tablet 1     Sig: TAKE 1 TABLET BY MOUTH EVERY DAY

## 2022-03-19 PROBLEM — I21.3 STEMI (ST ELEVATION MYOCARDIAL INFARCTION) (HCC): Status: ACTIVE | Noted: 2017-10-01

## 2022-03-19 PROBLEM — I25.10 CORONARY ARTERY DISEASE INVOLVING NATIVE CORONARY ARTERY: Status: ACTIVE | Noted: 2017-10-02

## 2022-04-18 RX ORDER — ATORVASTATIN CALCIUM 80 MG/1
80 TABLET, FILM COATED ORAL
Qty: 90 TABLET | Refills: 2 | Status: SHIPPED | OUTPATIENT
Start: 2022-04-18 | End: 2022-09-28 | Stop reason: SDUPTHER

## 2022-04-18 NOTE — TELEPHONE ENCOUNTER
Refill per VO of Dr. Sim Vogel:    Last appt: 9/16/2021    No future appointments. Requested Prescriptions     Pending Prescriptions Disp Refills    atorvastatin (LIPITOR) 80 mg tablet 90 Tablet 2     Sig: Take 1 Tablet by mouth nightly.

## 2022-05-09 RX ORDER — ISOSORBIDE MONONITRATE 30 MG/1
30 TABLET, EXTENDED RELEASE ORAL DAILY
Qty: 90 TABLET | Refills: 0 | Status: SHIPPED | OUTPATIENT
Start: 2022-05-09 | End: 2022-08-11 | Stop reason: SDUPTHER

## 2022-05-09 NOTE — TELEPHONE ENCOUNTER
Refill per VO of Dr. Smith Hy:    Last appt: Visit date not found    No future appointments. Requested Prescriptions     Pending Prescriptions Disp Refills    isosorbide mononitrate ER (IMDUR) 30 mg tablet 90 Tablet 3     Sig: Take 1 Tablet by mouth daily.

## 2022-05-09 NOTE — TELEPHONE ENCOUNTER
Patient requesting refill, patient states he is out of this med    [facility] pharmacy [phone]

## 2022-08-04 RX ORDER — ISOSORBIDE MONONITRATE 30 MG/1
TABLET, EXTENDED RELEASE ORAL
Qty: 90 TABLET | Refills: 0 | OUTPATIENT
Start: 2022-08-04

## 2022-08-04 NOTE — TELEPHONE ENCOUNTER
Refill per VO of Dr. Eduardo De La Fuente:    Last appt: Visit date not found    No future appointments.     Requested Prescriptions     Pending Prescriptions Disp Refills    isosorbide mononitrate ER (IMDUR) 30 mg tablet [Pharmacy Med Name: ISOSORBIDE MONONIT ER 30 MG TB] 90 Tablet 0     Sig: TAKE 1 TABLET BY MOUTH EVERY DAY       Refill was denied because,  PT NEEDS AN APPOINTMENT

## 2022-08-11 RX ORDER — ISOSORBIDE MONONITRATE 30 MG/1
30 TABLET, EXTENDED RELEASE ORAL DAILY
Qty: 30 TABLET | Refills: 0 | Status: SHIPPED | OUTPATIENT
Start: 2022-08-11 | End: 2022-09-14

## 2022-08-11 NOTE — TELEPHONE ENCOUNTER
Pt requested a refill on isosorbide mononitrate ER 30mg      Future Appointments   Date Time Provider David Patience   9/2/2022  3:20 PM Rocio Vasquez MD [facility] [phone]

## 2022-08-11 NOTE — TELEPHONE ENCOUNTER
Refill per VO of Dr. Carmen De La O:    Last appt: Visit date not found    Future Appointments   Date Time Provider David Albertoi   9/2/2022  3:20 PM Stewart Vasquez MD [facility]       Requested Prescriptions     Pending Prescriptions Disp Refills    isosorbide mononitrate ER (IMDUR) 30 mg tablet 90 Tablet 0     Sig: Take 1 Tablet by mouth in the morning.

## 2022-08-17 ENCOUNTER — TELEPHONE (OUTPATIENT)
Dept: CARDIOLOGY CLINIC | Age: 62
End: 2022-08-17

## 2022-08-29 RX ORDER — LISINOPRIL 10 MG/1
TABLET ORAL
Qty: 45 TABLET | Refills: 4 | Status: SHIPPED | OUTPATIENT
Start: 2022-08-29 | End: 2022-10-18 | Stop reason: SDUPTHER

## 2022-09-14 RX ORDER — ISOSORBIDE MONONITRATE 30 MG/1
TABLET, EXTENDED RELEASE ORAL
Qty: 30 TABLET | Refills: 0 | Status: SHIPPED | OUTPATIENT
Start: 2022-09-14 | End: 2022-09-28 | Stop reason: SDUPTHER

## 2022-09-14 NOTE — TELEPHONE ENCOUNTER
Refill per VO of Dr. Pricilla Vera:    Last appt: Visit date not found    Future Appointments   Date Time Provider David Patience   9/28/2022  4:20 PM Gladis Vasquez MD [facility]       Requested Prescriptions     Pending Prescriptions Disp Refills    isosorbide mononitrate ER (IMDUR) 30 mg tablet [Pharmacy Med Name: ISOSORBIDE MONONIT ER 30 MG TB] 30 Tablet 0     Sig: TAKE 1 TABLET BY MOUTH EVERY DAY IN THE MORNING

## 2022-09-28 ENCOUNTER — OFFICE VISIT (OUTPATIENT)
Dept: CARDIOLOGY CLINIC | Age: 62
End: 2022-09-28
Payer: COMMERCIAL

## 2022-09-28 VITALS
BODY MASS INDEX: 30.15 KG/M2 | OXYGEN SATURATION: 98 % | WEIGHT: 210.6 LBS | HEIGHT: 70 IN | DIASTOLIC BLOOD PRESSURE: 80 MMHG | HEART RATE: 78 BPM | SYSTOLIC BLOOD PRESSURE: 138 MMHG

## 2022-09-28 DIAGNOSIS — I10 ESSENTIAL HYPERTENSION: ICD-10-CM

## 2022-09-28 DIAGNOSIS — E78.5 DYSLIPIDEMIA: ICD-10-CM

## 2022-09-28 DIAGNOSIS — I21.3 ST ELEVATION MYOCARDIAL INFARCTION (STEMI), UNSPECIFIED ARTERY (HCC): ICD-10-CM

## 2022-09-28 DIAGNOSIS — I25.10 CORONARY ARTERY DISEASE INVOLVING NATIVE CORONARY ARTERY OF NATIVE HEART WITHOUT ANGINA PECTORIS: Primary | ICD-10-CM

## 2022-09-28 PROCEDURE — 99214 OFFICE O/P EST MOD 30 MIN: CPT | Performed by: INTERNAL MEDICINE

## 2022-09-28 PROCEDURE — 93000 ELECTROCARDIOGRAM COMPLETE: CPT | Performed by: INTERNAL MEDICINE

## 2022-09-28 RX ORDER — ISOSORBIDE MONONITRATE 30 MG/1
30 TABLET, EXTENDED RELEASE ORAL DAILY
Qty: 90 TABLET | Refills: 4 | Status: SHIPPED | OUTPATIENT
Start: 2022-09-28

## 2022-09-28 RX ORDER — ATORVASTATIN CALCIUM 80 MG/1
80 TABLET, FILM COATED ORAL
Qty: 90 TABLET | Refills: 4 | Status: SHIPPED | OUTPATIENT
Start: 2022-09-28

## 2022-09-28 RX ORDER — METOPROLOL TARTRATE 25 MG/1
TABLET, FILM COATED ORAL
Qty: 90 TABLET | Refills: 4 | Status: SHIPPED | OUTPATIENT
Start: 2022-09-28

## 2022-09-28 NOTE — PROGRESS NOTES
Chief Complaint   Patient presents with    Follow-up     Medication refill     Coronary Artery Disease     Stemi        Vitals:    09/28/22 1627   BP: 138/80   Pulse: 78   Height: 5' 10\" (1.778 m)   Weight: 210 lb 9.6 oz (95.5 kg)   SpO2: 98%         Chest pain: no    SOB: no    Palpitations: no    Dizziness: no    Swelling: no    Refills: all medication       Have you been to the ER, urgent care clinic since your last visit? Hospitalized since your last visit? no    Have you sen or consulted other health care providers outside of the [facility] since your last visit?  (Include any pap smears or colon screening.)

## 2022-09-28 NOTE — PROGRESS NOTES
All orders entered per verbal orders of Dr. Keke Dubois. MD Pedro    Check FLP, CMP, CBC, Vit D levels, TSH.   Vahe Mendiola PT  See Dr. Lorena aRy in 6 months. Refill per VO of Dr. Duarte Spotted:    Last appt: 9/16/2021    Future Appointments   Date Time Provider David Morin   3/30/2023  8:40 AM Keke Vasquez MD [facility]       Requested Prescriptions     Pending Prescriptions Disp Refills    atorvastatin (LIPITOR) 80 mg tablet 90 Tablet 4     Sig: Take 1 Tablet by mouth nightly. metoprolol tartrate (LOPRESSOR) 25 mg tablet 90 Tablet 4     Sig: TAKE 1/2 TABLET BY MOUTH TWICE DAILY    isosorbide mononitrate ER (IMDUR) 30 mg tablet 90 Tablet 4     Sig: Take 1 Tablet by mouth daily.

## 2022-09-28 NOTE — PROGRESS NOTES
Office Follow-up    NAME: Bo Nava   :  1960  MRM:  [id number]    Date:  2022            Assessment:     Problem List  Date Reviewed: 4/15/2020            Codes Class Noted    Coronary artery disease involving native coronary artery ICD-10-CM: I25.10  ICD-9-CM: 414.01  10/2/2017        STEMI (ST elevation myocardial infarction) [facility]) ICD-10-CM: I21.3  ICD-9-CM: 410.90  10/1/2017              Plan:     CAD status post ST elevation MI in the inferior walls, RCA stent () with residual diagonal and circumflex disease as well as moderate disease in the left main and LAD. Continue aspirin, Plavix, isosorbide mononitrate and high-dose Lipitor. Last stress test in  and repeat in  show excellent functional capacity with hypertensive BP response and EKG ischemia but no perfusion ischemia. Repeat Stress Nuc in . Hypertension: Continue dietary restriction of sodium to less than 2 g/day. Continue metoprolol and lisinopril. Dyslipidemia: Last labs from Aug 2020 reviewed. Has low HDL. Continue Lipitor. Advised to drink 1 glass of red wine daily. Bradycardia: Monitor. On Metoprolol   JONH- Uses CPAP   Check FLP, CMP, CBC, Vit D levels, TSH. See me again in 6 months. He is a  at [employer]. ATTENTION:   This medical record was transcribed using an electronic medical records/speech recognition system. Although proofread, it may and can contain electronic, spelling and other errors. Corrections may be executed at a later time. Please feel free to contact us for any clarifications as needed. Subjective:     Bo Nava, a 65y.o. year-old who presents for followup. He has known history of CAD, stimulation MI, diffuse CAD, moderate disease of the LAD and left main. Background history of hypertension dyslipidemia. He is here for follow-up. Denies any symptoms of chest pain, shortness of breath, lightheadedness or dizziness.   Remains active and exercises. Has regained some of his weight that he had lost last year. Exam:     Physical Exam:  Visit Vitals  /80   Pulse 78   Ht 5' 10\" (1.778 m)   Wt 210 lb 9.6 oz (95.5 kg)   SpO2 98%   BMI 30.22 kg/m²     General appearance - alert, well appearing, and in no distress  Mental status - affect appropriate to mood  Eyes - sclera anicteric, moist mucous membranes  Neck - supple, no significant adenopathy  Chest - clear to auscultation, no wheezes, rales or rhonchi  Heart - normal rate, regular rhythm, normal S1, S2, no murmurs, rubs, clicks or gallops  Abdomen - soft, nontender, nondistended, no masses or organomegaly  Extremities - peripheral pulses normal, no pedal edema  Skin - normal coloration  no rashes    Medications:     Current Outpatient Medications   Medication Sig    isosorbide mononitrate ER (IMDUR) 30 mg tablet TAKE 1 TABLET BY MOUTH EVERY DAY IN THE MORNING    lisinopriL (PRINIVIL, ZESTRIL) 10 mg tablet TAKE 1/2 A TABLETS BY MOUTH TWO (2) TIMES A DAY. atorvastatin (LIPITOR) 80 mg tablet Take 1 Tablet by mouth nightly. clopidogreL (PLAVIX) 75 mg tab TAKE 1 TABLET BY MOUTH EVERY DAY    metoprolol tartrate (LOPRESSOR) 25 mg tablet TAKE 1/2 TABLET BY MOUTH TWICE DAILY    aspirin 81 mg chewable tablet Take 1 Tab by mouth daily. No current facility-administered medications for this visit. Diagnostic Data Review:       10/10/17: STRESS- Abnml study. Ischemic stress test with stress-induced ischemic changes in infolateral walls. 10/2/2017: ECHO- LVEF 5-60% No WMA.     10/1/17: CATH- LM: mod, LAD: mild-mod;D1: p60%, OM2:m60%: Ramus p80%, RCA: p100%; L-> R collaterals distally, PDA: m70%  ---- s/p ANNE (Resolute 3x22, 2x15) -> RCA      Lab Review:     Lab Results   Component Value Date/Time    Cholesterol, total 112 10/13/2021 11:21 AM    HDL Cholesterol 29 (L) 10/13/2021 11:21 AM    LDL, calculated 58 10/13/2021 11:21 AM    LDL, calculated 43 08/04/2020 04:04 PM    Triglyceride 141 10/13/2021 11:21 AM    CHOL/HDL Ratio 4.4 10/02/2017 05:22 AM     Lab Results   Component Value Date/Time    Creatinine (POC) 1.4 (H) 10/01/2017 06:59 PM    Creatinine 1.26 10/13/2021 11:21 AM     Lab Results   Component Value Date/Time    BUN 17 10/13/2021 11:21 AM    BUN (POC) 17 10/01/2017 06:59 PM     Lab Results   Component Value Date/Time    Potassium 4.4 10/13/2021 11:21 AM     Lab Results   Component Value Date/Time    Hemoglobin A1c 5.6 10/08/2018 09:41 AM     Lab Results   Component Value Date/Time    Hemoglobin (POC) 12.9 10/01/2017 06:59 PM    HGB 12.9 (L) 10/13/2021 11:21 AM     Lab Results   Component Value Date/Time    PLATELET 739 74/24/0290 11:21 AM     No results for input(s): CPK, CKMB, TROIQ in the last 72 hours. No lab exists for component: CKQMB, CPKMB             ___________________________________________________    Valeriy Vivas.  Jw Pierre MD, [facility] [place]

## 2022-10-18 RX ORDER — LISINOPRIL 10 MG/1
TABLET ORAL
Qty: 90 TABLET | Refills: 4 | Status: SHIPPED | OUTPATIENT
Start: 2022-10-18

## 2022-10-18 RX ORDER — CLOPIDOGREL BISULFATE 75 MG/1
TABLET ORAL
Qty: 90 TABLET | Refills: 3 | OUTPATIENT
Start: 2022-10-18

## 2022-10-18 RX ORDER — CLOPIDOGREL BISULFATE 75 MG/1
75 TABLET ORAL DAILY
Qty: 90 TABLET | Refills: 4 | Status: SHIPPED | OUTPATIENT
Start: 2022-10-18

## 2022-10-18 NOTE — TELEPHONE ENCOUNTER
Pharmacy is calling to request a refill for Lisinopril 10mg + Clopidogrel 75mg. Please Advise.      Pharmacy Verified     [phone]

## 2022-10-18 NOTE — TELEPHONE ENCOUNTER
Refill per VO of Dr. Ivan Monday:    Last appt: Visit date not found    Future Appointments   Date Time Provider David Morin   3/30/2023  8:40 AM Rathi, Darylene Speedy, MD [facility] AMB       Requested Prescriptions     Pending Prescriptions Disp Refills    clopidogreL (PLAVIX) 75 mg tab [Pharmacy Med Name: CLOPIDOGREL 75 MG TABLET] 90 Tablet 3     Sig: TAKE 1 TABLET BY MOUTH EVERY DAY       Refill was denied because,  DUPLICATE REQUEST

## 2022-10-18 NOTE — TELEPHONE ENCOUNTER
Refill per VO of Dr. Abraham Kumar:    Last appt: Visit date not found    Future Appointments   Date Time Provider David Albertoi   3/30/2023  8:40 AM Karlos Vasquez MD [facility]       Requested Prescriptions     Pending Prescriptions Disp Refills    clopidogreL (PLAVIX) 75 mg tab 90 Tablet 3     Sig: Take 1 Tablet by mouth daily.     lisinopriL (PRINIVIL, ZESTRIL) 10 mg tablet 45 Tablet 4

## 2023-03-02 NOTE — PROGRESS NOTES
[address] [place]. [place], [address]   Tel.  [phone]   Fax. [address]   [place], [address] [facility]   Tel.  [phone]   Fax. [phone] [address]   Tel.  [phone]   Fax. [address] (: 1960) is a 65 y.o. male, new patient, seen for positive airway pressure follow-up and evaluation. He was last seen by Dr. Jan Aguilera on 3/2014, prior notes reviewed in detail. Home sleep test  showed AHI of 59/hr. A subsequent titration study showed events responding to CPAP 14 cmH2O. He is seen today for follow up. ASSESSMENT/PLAN:    ICD-10-CM ICD-9-CM    1. JONH (obstructive sleep apnea)  G47.33 327.23 AMB SUPPLY ORDER      2. Primary hypertension  I10 401.9       3. BMI 30.0-30.9,adult  Z68.30 V85.30         AHI = 59 (). On CPAP :  14 cmH2O. Set up . He is adherent with PAP therapy and PAP continues to benefit patient and remains necessary for control of his sleep apnea. Sleep Apnea - He notes he was unaware of the Constance recall. Information on how to register his device for replacement. He is eligible for a new device through insurance and would like to upgrade. New device order placed. He reports sleeping well at his current device settings. Orders Placed This Encounter    AMB SUPPLY ORDER     Primary Encounter Diagnosis: Obstructive Sleep Apnea  (G47.33)    ResMed Device with Heated Humidifer [id number] / [id number]. Eligible for new device - current machine >5 years. Positive Airway Pressure Therapy: Duration of need: 99 months. Set Pressure: 14 cmH2O     Nasal Cushion (Replace) 2 per month.  Nasal Interface Mask 1 every 3 months.  Headgear 1 every 6 months.  Filter(s) Disposable 2 per month.  Filter(s) Non-Disposable 1 every 6 months. [address] for [place] (Replace) 1 every 6 months.    Tubing with heating element 1 every 3 months. Perform Mask Fitting per patient preference and comfort - replace as above. Troy Sheth, [facility], [facility] NPI: [id number]  Electronically signed. 03/03/23     * Counseling was provided regarding the importance of regular PAP use with emphasis on ensuring sufficient total sleep time, proper sleep hygiene, and safe driving. * Re-enforced proper and regular cleaning for the device. * He was asked to contact our office for any problems regarding PAP therapy. 2. Hypertension -  continue on his current regimen, he will continue to monitor his BP and follow up with his PMD for reevaluation/adjustment of medications if warranted. I have reviewed the relationship between hypertension as it relates to sleep-disordered breathing. 3. Recommended a dedicated weight loss program through appropriate diet and exercise regimen as significant weight reduction has been shown to reduce severity of obstructive sleep apnea. SUBJECTIVE/OBJECTIVE:    He  is seen today for follow up on PAP device and reports no problems using the device. The following concerns identified:    Drowsiness no Problems exhaling no   Snoring no Forget to put on no   Mask Comfortable yes Can't fall asleep no   Dry Mouth no Mask falls off no   Air Leaking no Frequent awakenings no     He admits that his sleep has improved on PAP therapy using nasal mask and heated tubing. Review of device download indicated:  CPAP pressure: 14 cmH2O  Average % Night in Large Leak:  0  % Used Days >= 4 hours: 100. Avg hours used:  7 hours 56 minutes. Therapy Apnea Index averaged over PAP use: 0.1 /hr which reflects significantly improved sleep breathing condition. [place] Sleepiness Score: 3 and Modified F.O.S.Q. Score Total / 2: 20 which reflects improved sleep quality over therapy time. Sleep Review of Systems: notable for Negative difficulty falling asleep;  Negative awakenings at night; Negative early morning headaches; Negative memory problems; Negative concentration issues; Negative chest pain; Negative shortness of breath; Negative significant joint pain at night; Negative significant muscle pain at night; Negative rashes or itching; Negative heartburn; Negative significant mood issues; 0 afternoon naps per week      Visit Vitals  BP (!) 151/84 (BP 1 Location: Left upper arm, BP Patient Position: Sitting)   Pulse (!) 57   Ht 5' 10\" (1.778 m)   Wt 210 lb (95.3 kg)   SpO2 99%   BMI 30.13 kg/m²          General:   Alert, oriented, not in acute distress   Eyes:  Anicteric Sclerae; no obvious strabismus   Nose:  No obvious nasal septum deviation    Neck:   Midline trachea   Chest/Lungs:  Symmetrical lung expansion   CVS:  Normal rate, no JVD   Extremities:  No obvious rashes, no edema    Neuro:  No focal deficits; No obvious tremor    Psych:  Normal affect,  normal countenance     Patient's phone number [phone] (home) [phone] (work) was reviewed and confirmed for accuracy. He gives permission for messages regarding results and appointments to be left at that number. On this date 03/03/2023 I have spent 30 minutes reviewing previous notes, test results and face to face with the patient discussing the diagnosis and importance of compliance with the treatment plan as well as documenting on the day of the visit. An electronic signature was used to authenticate this note.     -- Paramjit Serra NP, [facility]  03/03/23

## 2023-03-03 ENCOUNTER — OFFICE VISIT (OUTPATIENT)
Dept: SLEEP MEDICINE | Age: 63
End: 2023-03-03

## 2023-03-03 VITALS
BODY MASS INDEX: 30.06 KG/M2 | HEART RATE: 57 BPM | SYSTOLIC BLOOD PRESSURE: 151 MMHG | DIASTOLIC BLOOD PRESSURE: 84 MMHG | HEIGHT: 70 IN | WEIGHT: 210 LBS | OXYGEN SATURATION: 99 %

## 2023-03-03 DIAGNOSIS — G47.33 OSA (OBSTRUCTIVE SLEEP APNEA): Primary | ICD-10-CM

## 2023-03-03 DIAGNOSIS — I10 PRIMARY HYPERTENSION: ICD-10-CM

## 2023-03-03 NOTE — PATIENT INSTRUCTIONS
[address] [place]. [place], [address]  Tel.  [phone]  Fax. [address]  [place], [address] [facility]  Tel.  [phone]  Fax. [phone] [address]  Tel.  [phone]  Fax. [phone]     Learning About CPAP for Sleep Apnea  What is CPAP? CPAP is a small machine that you use at home every night while you sleep. It increases air pressure in your throat to keep your airway open. When you have sleep apnea, this can help you sleep better so you feel much better. CPAP stands for \"continuous positive airway pressure. \"  The CPAP machine will have one of the following:  A mask that covers your nose and mouth  Prongs that fit into your nose  A mask that covers your nose only, the most common type. This type is called NCPAP. The N stands for \"nasal.\"  Why is it done? CPAP is usually the best treatment for obstructive sleep apnea. It is the first treatment choice and the most widely used. Your doctor may suggest CPAP if you have: Moderate to severe sleep apnea. Sleep apnea and coronary artery disease (CAD) or heart failure. How does it help? CPAP can help you have more normal sleep, so you feel less sleepy and more alert during the daytime. CPAP may help keep heart failure or other heart problems from getting worse. NCPAP may help lower your blood pressure. If you use CPAP, your bed partner may also sleep better because you are not snoring or restless. What are the side effects? Some people who use CPAP have:  A dry or stuffy nose and a sore throat. Irritated skin on the face. Sore eyes. Bloating. If you have any of these problems, work with your doctor to fix them. Here are some things you can try:  Be sure the mask or nasal prongs fit well. See if your doctor can adjust the pressure of your CPAP. If your nose is dry, try a humidifier.   If your nose is runny or stuffy, try decongestant medicine or a steroid nasal spray. If these things do not help, you might try a different type of machine. Some machines have air pressure that adjusts on its own. Others have air pressures that are different when you breathe in than when you breathe out. This may reduce discomfort caused by too much pressure in your nose. Where can you learn more? Go to [employer].be  Enter Karl Angulo in the search box to learn more about \"Learning About CPAP for Sleep Apnea. \"   © 1167-1072 Healthwise, Incorporated. Care instructions adapted under license by [address] [place] [address] [employer] (which disclaims liability or warranty for this information). This care instruction is for use with your licensed healthcare professional. If you have questions about a medical condition or this instruction, always ask your healthcare professional. [address] any warranty or liability for your use of this information. Content Version: 2.0.71469; Last Revised: January 11, 2010  PROPER SLEEP HYGIENE    What to avoid  Do not have drinks with caffeine, such as coffee or black tea, for 8 hours before bed. Do not smoke or use other types of tobacco near bedtime. Nicotine is a stimulant and can keep you awake. Avoid drinking alcohol late in the evening, because it can cause you to wake in the middle of the night. Do not eat a big meal close to bedtime. If you are hungry, eat a light snack. Do not drink a lot of water close to bedtime, because the need to urinate may wake you up during the night. Do not read or watch TV in bed. Use the bed only for sleeping and sexual activity. What to try  Go to bed at the same time every night, and wake up at the same time every morning. Do not take naps during the day. Keep your bedroom quiet, dark, and cool. Get regular exercise, but not within 3 to 4 hours of your bedtime. .  Sleep on a comfortable pillow and mattress.   If watching the clock makes you anxious, turn it facing away from you so you cannot see the time. If you worry when you lie down, start a worry book. Well before bedtime, write down your worries, and then set the book and your concerns aside. Try meditation or other relaxation techniques before you go to bed. If you cannot fall asleep, get up and go to another room until you feel sleepy. Do something relaxing. Repeat your bedtime routine before you go to bed again. Make your house quiet and calm about an hour before bedtime. Turn down the lights, turn off the TV, log off the computer, and turn down the volume on music. This can help you relax after a busy day. Drowsy Driving: The [facility] cites drowsiness as a causing factor in more than 050,524 police reported crashes annually, resulting in 76,000 injuries and 1,500 deaths. Other surveys suggest 55% of people polled have driven while drowsy in the past year, 23% had fallen asleep but not crashed, 3% crashed, and 2% had and accident due to drowsy driving. Who is at risk? Young Drivers: One study of drowsy driving accidents states that 55% of the drivers were under 25 years. Of those, 75% were male. Shift Workers and Travelers: People who work overnight or travel across time zones frequently are at higher risk of experiencing Circadian Rhythm Disorders. They are trying to work and function when their body is programed to sleep. Sleep Deprived: Lack of sleep has a serious impact on your ability to pay attention or focus on a task. Consistently getting less than the average of 8 hours your body needs creates partial or cumulative sleep deprivation. Untreated Sleep Disorders: Sleep Apnea, Narcolepsy, R.L.S., and other sleep disorders (untreated) prevent a person from getting enough restful sleep. This leads to excessive daytime sleepiness and increases the risk for drowsy driving accidents by up to 7 times.   Medications / Alcohol: Even over the counter medications can cause drowsiness. Medications that impair a drivers attention should have a warning label. Alcohol naturally makes you sleepy and on its own can cause accidents. Combined with excessive drowsiness its effects are amplified. Signs of Drowsy Driving:   * You don't remember driving the last few miles   * You may drift out of your ronna   * You are unable to focus and your thoughts wander   * You may yawn more often than normal   * You have difficulty keeping your eyes open / nodding off   * Missing traffic signs, speeding, or tailgating  Prevention-   Good sleep hygiene, lifestyle and behavioral choices have the most impact on drowsy driving. There is no substitute for sleep and the average person requires 8 hours nightly. If you find yourself driving drowsy, stop and sleep. Consider the sleep hygiene tips provided during your visit as well. Medication Refill Policy: Refills for all medications require 1 week advance notice. Please have your pharmacy fax a refill request. We are unable to fax, or call in \"controled substance\" medications and you will need to pick these prescriptions up from our office. [employer] Activation    Thank you for requesting access to [employer]. Please follow the instructions below to securely access and download your online medical record. [employer] allows you to send messages to your doctor, view your test results, renew your prescriptions, schedule appointments, and more. How Do I Sign Up? In your internet browser, go to [email or web address]. Click on the First Time User? Click Here link in the Sign In box. You will see the New Member Sign Up page. Enter your [employer] Access Code exactly as it appears below. You will not need to use this code after youve completed the sign-up process. If you do not sign up before the expiration date, you must request a new code. [employer] Access Code:  Activation code not generated  Current [employer] Status: Active (This is the date your [employer] access code will )    Enter the last four digits of your Social Security Number (xxxx) and Date of Birth (mm/dd/yyyy) as indicated and click Submit. You will be taken to the next sign-up page. Create a [employer] ID. This will be your [employer] login ID and cannot be changed, so think of one that is secure and easy to remember. Create a [employer] password. You can change your password at any time. Enter your Password Reset Question and Answer. This can be used at a later time if you forget your password. Enter your e-mail address. You will receive e-mail notification when new information is available in [address] Click Sign Up. You can now view and download portions of your medical record. Click the [employer] link to download a portable copy of your medical information. Additional Information    If you have questions, please call [phone]. Remember, [employer] is NOT to be used for urgent needs. For medical emergencies, dial 911.

## 2023-03-06 ENCOUNTER — TELEPHONE (OUTPATIENT)
Dept: SLEEP MEDICINE | Age: 63
End: 2023-03-06

## 2023-03-06 NOTE — TELEPHONE ENCOUNTER
Spoke with patient and informed him that we were sending his order to [facility] and he should hear from them within 7-10 business days or he may give them a call to follow up on the status of his order.

## 2023-03-18 LAB
25(OH)D3+25(OH)D2 SERPL-MCNC: 79.1 NG/ML (ref 30–100)
ALBUMIN SERPL-MCNC: 4.4 G/DL (ref 3.8–4.8)
ALBUMIN/GLOB SERPL: 2.2 {RATIO} (ref 1.2–2.2)
ALP SERPL-CCNC: 135 IU/L (ref 44–121)
ALT SERPL-CCNC: 21 IU/L (ref 0–44)
AST SERPL-CCNC: 15 IU/L (ref 0–40)
BILIRUB SERPL-MCNC: 0.5 MG/DL (ref 0–1.2)
BUN SERPL-MCNC: 17 MG/DL (ref 8–27)
BUN/CREAT SERPL: 13 (ref 10–24)
CALCIUM SERPL-MCNC: 9.6 MG/DL (ref 8.6–10.2)
CHLORIDE SERPL-SCNC: 105 MMOL/L (ref 96–106)
CHOLEST SERPL-MCNC: 109 MG/DL (ref 100–199)
CO2 SERPL-SCNC: 25 MMOL/L (ref 20–29)
CREAT SERPL-MCNC: 1.35 MG/DL (ref 0.76–1.27)
EGFRCR SERPLBLD CKD-EPI 2021: 60 ML/MIN/1.73
ERYTHROCYTE [DISTWIDTH] IN BLOOD BY AUTOMATED COUNT: 12.4 % (ref 11.6–15.4)
GLOBULIN SER CALC-MCNC: 2 G/DL (ref 1.5–4.5)
GLUCOSE SERPL-MCNC: 99 MG/DL (ref 70–99)
HCT VFR BLD AUTO: 35.6 % (ref 37.5–51)
HDLC SERPL-MCNC: 30 MG/DL
HGB BLD-MCNC: 12.6 G/DL (ref 13–17.7)
IMP & REVIEW OF LAB RESULTS: NORMAL
LDLC SERPL CALC-MCNC: 61 MG/DL (ref 0–99)
MCH RBC QN AUTO: 31.6 PG (ref 26.6–33)
MCHC RBC AUTO-ENTMCNC: 35.4 G/DL (ref 31.5–35.7)
MCV RBC AUTO: 89 FL (ref 79–97)
PLATELET # BLD AUTO: 187 X10E3/UL (ref 150–450)
POTASSIUM SERPL-SCNC: 4.7 MMOL/L (ref 3.5–5.2)
PROT SERPL-MCNC: 6.4 G/DL (ref 6–8.5)
RBC # BLD AUTO: 3.99 X10E6/UL (ref 4.14–5.8)
SODIUM SERPL-SCNC: 143 MMOL/L (ref 134–144)
TRIGL SERPL-MCNC: 93 MG/DL (ref 0–149)
TSH SERPL DL<=0.005 MIU/L-ACNC: 2.79 UIU/ML (ref 0.45–4.5)
VLDLC SERPL CALC-MCNC: 18 MG/DL (ref 5–40)
WBC # BLD AUTO: 5.7 X10E3/UL (ref 3.4–10.8)

## 2023-03-30 ENCOUNTER — OFFICE VISIT (OUTPATIENT)
Dept: CARDIOLOGY CLINIC | Age: 63
End: 2023-03-30

## 2023-03-30 VITALS
BODY MASS INDEX: 30.64 KG/M2 | SYSTOLIC BLOOD PRESSURE: 138 MMHG | HEIGHT: 70 IN | HEART RATE: 57 BPM | OXYGEN SATURATION: 97 % | WEIGHT: 214 LBS | DIASTOLIC BLOOD PRESSURE: 70 MMHG

## 2023-03-30 DIAGNOSIS — I10 PRIMARY HYPERTENSION: ICD-10-CM

## 2023-03-30 DIAGNOSIS — E78.5 DYSLIPIDEMIA: ICD-10-CM

## 2023-03-30 DIAGNOSIS — I21.3 ST ELEVATION MYOCARDIAL INFARCTION (STEMI), UNSPECIFIED ARTERY (HCC): Primary | ICD-10-CM

## 2023-03-30 DIAGNOSIS — I25.10 CORONARY ARTERY DISEASE INVOLVING NATIVE CORONARY ARTERY OF NATIVE HEART WITHOUT ANGINA PECTORIS: ICD-10-CM

## 2023-03-30 NOTE — PROGRESS NOTES
Chief Complaint   Patient presents with    Follow-up     6 mo   STEMI    Coronary Artery Disease    Cholesterol Problem    Hypertension     Vitals:    03/30/23 0854   BP: 138/70   BP 1 Location: Left upper arm   BP Patient Position: Sitting   Pulse: (!) 57   Height: 5' 10\" (1.778 m)   Weight: 214 lb (97.1 kg)   SpO2: 97%       Chest pain denied     SOB denied     Palpitations denied     Swelling in hands/feet denied     Dizziness denied     Recent hospital stays denied     Refills denied Banner Transposition Flap Text: Because of orientation and full-thickness nature of the defect, a long rhombic transposition flap (banner flap) was planned. After prep and anesthesia, the rhombic flap was carefully incised to straddle relaxed skin tension lines and the anticipated Burow's triangle removed. The flap was raised and the wound edges were all undermined in the subcutaneous plane. After hemostasis, the flap was transposed into the defect and sutured in a layered fashion.

## 2023-05-26 RX ORDER — CLOPIDOGREL BISULFATE 75 MG/1
75 TABLET ORAL DAILY
COMMUNITY
Start: 2022-10-18

## 2023-05-26 RX ORDER — ISOSORBIDE MONONITRATE 30 MG/1
30 TABLET, EXTENDED RELEASE ORAL DAILY
COMMUNITY
Start: 2022-09-28

## 2023-05-26 RX ORDER — ASPIRIN 81 MG/1
81 TABLET, CHEWABLE ORAL DAILY
COMMUNITY
Start: 2017-10-03

## 2023-05-26 RX ORDER — ATORVASTATIN CALCIUM 80 MG/1
1 TABLET, FILM COATED ORAL NIGHTLY
COMMUNITY
Start: 2022-09-28

## 2023-05-26 RX ORDER — LISINOPRIL 10 MG/1
TABLET ORAL
COMMUNITY
Start: 2022-10-18

## 2023-06-06 ENCOUNTER — OFFICE VISIT (OUTPATIENT)
Age: 63
End: 2023-06-06
Payer: COMMERCIAL

## 2023-06-06 VITALS
DIASTOLIC BLOOD PRESSURE: 64 MMHG | HEART RATE: 60 BPM | BODY MASS INDEX: 29.94 KG/M2 | HEIGHT: 70 IN | OXYGEN SATURATION: 97 % | WEIGHT: 209.1 LBS | SYSTOLIC BLOOD PRESSURE: 117 MMHG

## 2023-06-06 DIAGNOSIS — I10 ESSENTIAL (PRIMARY) HYPERTENSION: ICD-10-CM

## 2023-06-06 DIAGNOSIS — G47.33 OBSTRUCTIVE SLEEP APNEA (ADULT) (PEDIATRIC): Primary | ICD-10-CM

## 2023-06-06 PROCEDURE — 3078F DIAST BP <80 MM HG: CPT | Performed by: NURSE PRACTITIONER

## 2023-06-06 PROCEDURE — 3074F SYST BP LT 130 MM HG: CPT | Performed by: NURSE PRACTITIONER

## 2023-06-06 PROCEDURE — 99213 OFFICE O/P EST LOW 20 MIN: CPT | Performed by: NURSE PRACTITIONER

## 2023-06-06 ASSESSMENT — SLEEP AND FATIGUE QUESTIONNAIRES
HOW LIKELY ARE YOU TO NOD OFF OR FALL ASLEEP WHILE SITTING QUIETLY AFTER LUNCH WITHOUT ALCOHOL: 0
HOW LIKELY ARE YOU TO NOD OFF OR FALL ASLEEP WHILE WATCHING TV: 1
ESS TOTAL SCORE: 3
HOW LIKELY ARE YOU TO NOD OFF OR FALL ASLEEP WHILE SITTING AND TALKING TO SOMEONE: 0
HOW LIKELY ARE YOU TO NOD OFF OR FALL ASLEEP WHILE LYING DOWN TO REST IN THE AFTERNOON WHEN CIRCUMSTANCES PERMIT: 1
HOW LIKELY ARE YOU TO NOD OFF OR FALL ASLEEP WHEN YOU ARE A PASSENGER IN A CAR FOR AN HOUR WITHOUT A BREAK: 0
HOW LIKELY ARE YOU TO NOD OFF OR FALL ASLEEP WHILE SITTING INACTIVE IN A PUBLIC PLACE: 0
HOW LIKELY ARE YOU TO NOD OFF OR FALL ASLEEP IN A CAR, WHILE STOPPED FOR A FEW MINUTES IN TRAFFIC: 0
HOW LIKELY ARE YOU TO NOD OFF OR FALL ASLEEP WHILE SITTING AND READING: 1

## 2023-06-06 NOTE — PATIENT INSTRUCTIONS
[address] [facility] [place]. [place], [address]  Tel.  [phone]  Fax. [address] [facility] [address]  [place], [address] [facility]  Tel.  [phone]  Fax. [phone] [address]  Tel.  [phone]  Fax. [phone]     Learning About CPAP for Sleep Apnea  What is CPAP? CPAP is a small machine that you use at home every night while you sleep. It increases air pressure in your throat to keep your airway open. When you have sleep apnea, this can help you sleep better so you feel much better. CPAP stands for \"continuous positive airway pressure. \"  The CPAP machine will have one of the following:  A mask that covers your nose and mouth  Prongs that fit into your nose  A mask that covers your nose only, the most common type. This type is called NCPAP. The N stands for \"nasal.\"  Why is it done? CPAP is usually the best treatment for obstructive sleep apnea. It is the first treatment choice and the most widely used. Your doctor may suggest CPAP if you have: Moderate to severe sleep apnea. Sleep apnea and coronary artery disease (CAD) or heart failure. How does it help? CPAP can help you have more normal sleep, so you feel less sleepy and more alert during the daytime. CPAP may help keep heart failure or other heart problems from getting worse. NCPAP may help lower your blood pressure. If you use CPAP, your bed partner may also sleep better because you are not snoring or restless. What are the side effects? Some people who use CPAP have:  A dry or stuffy nose and a sore throat. Irritated skin on the face. Sore eyes. Bloating. If you have any of these problems, work with your doctor to fix them. Here are some things you can try:  Be sure the mask or nasal prongs fit well. See if your doctor can adjust the pressure of your CPAP. If your nose is dry, try a humidifier.   If your nose is runny or stuffy, try decongestant medicine or a steroid

## 2023-09-21 ENCOUNTER — OFFICE VISIT (OUTPATIENT)
Age: 63
End: 2023-09-21
Payer: COMMERCIAL

## 2023-09-21 VITALS
WEIGHT: 205 LBS | OXYGEN SATURATION: 97 % | HEART RATE: 68 BPM | SYSTOLIC BLOOD PRESSURE: 158 MMHG | HEIGHT: 70 IN | DIASTOLIC BLOOD PRESSURE: 100 MMHG | BODY MASS INDEX: 29.35 KG/M2

## 2023-09-21 DIAGNOSIS — E78.5 HYPERLIPIDEMIA, UNSPECIFIED HYPERLIPIDEMIA TYPE: ICD-10-CM

## 2023-09-21 DIAGNOSIS — I25.10 ATHEROSCLEROSIS OF NATIVE CORONARY ARTERY OF NATIVE HEART WITHOUT ANGINA PECTORIS: ICD-10-CM

## 2023-09-21 DIAGNOSIS — I10 ESSENTIAL (PRIMARY) HYPERTENSION: ICD-10-CM

## 2023-09-21 DIAGNOSIS — I21.11 ST ELEVATION MYOCARDIAL INFARCTION INVOLVING RIGHT CORONARY ARTERY (HCC): Primary | ICD-10-CM

## 2023-09-21 PROCEDURE — 3077F SYST BP >= 140 MM HG: CPT | Performed by: INTERNAL MEDICINE

## 2023-09-21 PROCEDURE — 99214 OFFICE O/P EST MOD 30 MIN: CPT | Performed by: INTERNAL MEDICINE

## 2023-09-21 PROCEDURE — 3080F DIAST BP >= 90 MM HG: CPT | Performed by: INTERNAL MEDICINE

## 2023-09-21 ASSESSMENT — PATIENT HEALTH QUESTIONNAIRE - PHQ9
SUM OF ALL RESPONSES TO PHQ QUESTIONS 1-9: 0
1. LITTLE INTEREST OR PLEASURE IN DOING THINGS: 0
SUM OF ALL RESPONSES TO PHQ9 QUESTIONS 1 & 2: 0
SUM OF ALL RESPONSES TO PHQ QUESTIONS 1-9: 0
2. FEELING DOWN, DEPRESSED OR HOPELESS: 0
SUM OF ALL RESPONSES TO PHQ QUESTIONS 1-9: 0
SUM OF ALL RESPONSES TO PHQ QUESTIONS 1-9: 0

## 2023-09-21 NOTE — PATIENT INSTRUCTIONS
Exercise stress Nuc- CAD, STEMI    Increase the dose of Lisinopril to 10mg po daily. See Dr. Ann Jj in 6 months.

## 2023-09-21 NOTE — PROGRESS NOTES
Office Follow-up    NAME: Kristopher Pickett   :  1960  MRM:  [id number]    Date:  3/30/2023            Assessment:     Problem List  Date Reviewed: 4/15/2020            Codes Class Noted    Coronary artery disease involving native coronary artery ICD-10-CM: I25.10  ICD-9-CM: 414.01  10/2/2017        STEMI (ST elevation myocardial infarction) [facility]) ICD-10-CM: I21.3  ICD-9-CM: 410.90  10/1/2017              Plan:     CAD status post ST elevation MI in the inferior walls, RCA stent () with residual diagonal and circumflex disease as well as moderate disease in the left main and LAD. Continue aspirin, Plavix, isosorbide mononitrate and high-dose Lipitor. Last stress test in  and repeat in  show excellent functional capacity with hypertensive BP response and EKG ischemia but and perfusion defect in the inferior wall which was mostly infarct. Will repeat Stress Nuc to evaluate moderate LM and LAD disease. Hypertension: Continue dietary restriction of sodium to less than 2 g/day. BP today is 150 range. He has lost 15 lb in past 6 months. May need tomasz increase Lisinopril to 10mg full tablet. Continue metoprolol and lisinopril. Dyslipidemia: Last labs from 2023 reviewed. Has low HDL. Continue Lipitor. Bradycardia: Monitor. On Metoprolol   TIA- Uses CPAP   See me again in 6 months. He is a  at [employer]. ATTENTION:   This medical record was transcribed using an electronic medical records/speech recognition system. Although proofread, it may and can contain electronic, spelling and other errors. Corrections may be executed at a later time. Please feel free to contact us for any clarifications as needed. Subjective:     No chest pain, SOB, palpitations, or dizziness. ----------------  Kristopher Pickett, a 59y.o. year-old who presents for followup. He has known history of CAD, stimulation MI, diffuse CAD, moderate disease of the LAD and left main.

## 2023-09-21 NOTE — PROGRESS NOTES
Chief Complaint   Patient presents with    Follow-up     6 mo   STEMI     Coronary Artery Disease     Vitals:    09/21/23 0838 09/21/23 0851   BP: (!) 150/90 (!) 158/100   Site: Left Upper Arm Right Upper Arm   Position: Sitting Sitting   Pulse: 68    SpO2: 97%    Weight: 205 lb (93 kg)    Height: 5' 10\" (1.778 m)          Chest pain denied     SOB denied     Palpitations denied     Swelling in hands/feet denied     Dizziness denied     Recent hospital stays denied     Refills denied

## 2023-09-21 NOTE — PROGRESS NOTES
Received VO from Dr. Eileen aRmirez:    Exercise stress Nuc- CAD, STEMI   pt given green sheet instructions  Increase the dose of Lisinopril to 10mg po daily. See Dr. Silke Gallegos in 6 months.

## 2023-09-28 ENCOUNTER — TELEPHONE (OUTPATIENT)
Age: 63
End: 2023-09-28

## 2023-09-28 DIAGNOSIS — I25.10 ATHEROSCLEROTIC HEART DISEASE OF NATIVE CORONARY ARTERY WITHOUT ANGINA PECTORIS: ICD-10-CM

## 2023-09-28 DIAGNOSIS — I21.3 ST ELEVATION (STEMI) MYOCARDIAL INFARCTION OF UNSPECIFIED SITE (HCC): ICD-10-CM

## 2023-09-28 DIAGNOSIS — E78.5 HYPERLIPIDEMIA, UNSPECIFIED HYPERLIPIDEMIA TYPE: Primary | ICD-10-CM

## 2023-09-28 NOTE — TELEPHONE ENCOUNTER
Pt. Calling to schedule 1 day Nuclear Stress Test Per Dr. Lakia Morin, no order in system, Pt. Will have his  call back tomorrow afternoon or Monday to schedule. No Call Back Needed.

## 2023-10-17 RX ORDER — ISOSORBIDE MONONITRATE 30 MG/1
30 TABLET, EXTENDED RELEASE ORAL DAILY
Qty: 90 TABLET | Refills: 4 | Status: SHIPPED | OUTPATIENT
Start: 2023-10-17

## 2023-10-17 RX ORDER — ATORVASTATIN CALCIUM 80 MG/1
TABLET, FILM COATED ORAL NIGHTLY
Qty: 90 TABLET | Refills: 4 | Status: SHIPPED | OUTPATIENT
Start: 2023-10-17

## 2023-10-17 NOTE — TELEPHONE ENCOUNTER
Refill per VO of Dr. Funez Daron:    9/21/2023    Future Appointments   Date Time Provider [address]   11/22/2023  8:00 AM [facility]   4/19/2024  9:20 AM Tere Phillips MD [facility] BS AMB   6/11/2024  9:10 AM Delfino Soto, APRN - NP [facility] BS AMB       Requested Prescriptions     Pending Prescriptions Disp Refills    atorvastatin (LIPITOR) 80 MG tablet [Pharmacy Med Name: ATORVASTATIN 80 MG TABLET] 90 tablet 4     Sig: TAKE 1 TABLET BY MOUTH NIGHTLY    metoprolol tartrate (LOPRESSOR) 25 MG tablet [Pharmacy Med Name: METOPROLOL TARTRATE 25 MG TAB] 90 tablet 4     Sig: TAKE 1/2 TABLET BY MOUTH TWICE A DAY    isosorbide mononitrate (IMDUR) 30 MG extended release tablet [Pharmacy Med Name: ISOSORBIDE MONONIT ER 30 MG TB] 90 tablet 4     Sig: TAKE 1 TABLET BY MOUTH EVERY DAY

## 2023-10-24 RX ORDER — CLOPIDOGREL BISULFATE 75 MG/1
75 TABLET ORAL DAILY
Qty: 90 TABLET | Refills: 3 | Status: SHIPPED | OUTPATIENT
Start: 2023-10-24

## 2023-10-24 NOTE — TELEPHONE ENCOUNTER
Refill per VO of Dr. Pruitt Han2023    Future Appointments   Date Time Provider [address]   2023  8:00 AM [facility] NUC[facility] 1 [facility] BS AMB   2024  9:20 AM Simone Phillips MD [facility] BS AMB   2024  9:10 AM Mlia Soto, APRN - NP [facility] BS AMB       Requested Prescriptions     Pending Prescriptions Disp Refills    clopidogrel (PLAVIX) 75 MG tablet [Pharmacy Med Name: CLOPIDOGREL 75 MG TABLET] 90 tablet 4     Sig: TAKE 1 TABLET BY MOUTH EVERY DAY

## 2023-11-06 ENCOUNTER — TELEPHONE (OUTPATIENT)
Age: 63
End: 2023-11-06

## 2023-11-06 NOTE — TELEPHONE ENCOUNTER
Pt has a question about speed for the nuclear test.  Would like a call back. Unable to reach pt.  Message left with HIPPA compliant message and call back number

## 2023-11-06 NOTE — TELEPHONE ENCOUNTER
Patient returned Nurse call would like callback       Spoke with the pt,, identified the pt with name and . He's asking about the speed and incline we put the treadmill at for our stress test, he wants to practice. I explained to him there is no set numbers, they start at 10% incline @ 1.7 MPH and increase both a little at a time every 3 minutes until he reaches 85% of his ideal top heart rate. The pt expressed understanding and agreement. Pt has no further questions or concerns at this time.

## 2023-11-06 NOTE — TELEPHONE ENCOUNTER
Pt has a question about speed for the nuclear test.  Would like a call back.          Pt# [phone]

## 2023-11-15 ENCOUNTER — TELEPHONE (OUTPATIENT)
Age: 63
End: 2023-11-15

## 2023-11-15 DIAGNOSIS — I25.10 ATHEROSCLEROSIS OF NATIVE CORONARY ARTERY OF NATIVE HEART WITHOUT ANGINA PECTORIS: ICD-10-CM

## 2023-11-15 DIAGNOSIS — E78.5 HYPERLIPIDEMIA, UNSPECIFIED HYPERLIPIDEMIA TYPE: ICD-10-CM

## 2023-11-15 DIAGNOSIS — I10 ESSENTIAL (PRIMARY) HYPERTENSION: ICD-10-CM

## 2023-11-15 DIAGNOSIS — I21.02 ACUTE ST ELEVATION MYOCARDIAL INFARCTION (STEMI) DUE TO OCCLUSION OF DISTAL PORTION OF LEFT ANTERIOR DESCENDING (LAD) CORONARY ARTERY (HCC): ICD-10-CM

## 2023-11-15 DIAGNOSIS — I21.3 ST ELEVATION (STEMI) MYOCARDIAL INFARCTION OF UNSPECIFIED SITE (HCC): ICD-10-CM

## 2023-11-15 DIAGNOSIS — I21.11 ST ELEVATION MYOCARDIAL INFARCTION INVOLVING RIGHT CORONARY ARTERY (HCC): Primary | ICD-10-CM

## 2023-11-15 DIAGNOSIS — I25.10 LEFT MAIN CORONARY ARTERY DISEASE: ICD-10-CM

## 2023-11-15 NOTE — TELEPHONE ENCOUNTER
Peer to peer needed today, 11/15/2023 for scheduled nuclear stress test on 11/22/2023 at 8:00. Please call lucila, phone [phone], order id #[id number]. I have sent today, 11/15/2023 to lucila the office note from [address] 9/21/2023 and 3/30/2023 along with results of last nuclear stress test on 10/21/2021. Clinical Rationale: Your doctor told us that you have heart disease. Your doctor ordered a special heart test. This test measures blood flow to the heart. This test is needed when there are new or worsening symptoms of heart disease despite optimal treatment. These symptoms might include chest pain or trouble breathing. We reviewed the notes we received. The notes do not show that you have new or worsening heart symptoms. So, we cannot approve this request as medically necessary. We used [facility] Medical Benefits Management Clinical Guideline titled Imaging of the Heart, Myocardial Perfusion Imaging to make this decision. You may view this guideline at [email or web address].     Thank you,     Hay Huitron

## 2023-11-15 NOTE — TELEPHONE ENCOUNTER
Case is now closed even with the additional information that I sent earlier today, 11/15/2023.     Please call patient to cancel nuclear stress test.     Thank you,     Anuj Schrader

## 2023-11-15 NOTE — TELEPHONE ENCOUNTER
New order opened. Pending for peer to peer, please call lucila at phone [phone], order id #[id number].      Thank you,     Shante Kemp

## 2023-11-20 ENCOUNTER — TELEPHONE (OUTPATIENT)
Age: 63
End: 2023-11-20

## 2024-01-15 RX ORDER — LISINOPRIL 10 MG/1
TABLET ORAL
Qty: 90 TABLET | Refills: 2 | Status: SHIPPED | OUTPATIENT
Start: 2024-01-15

## 2024-01-15 NOTE — TELEPHONE ENCOUNTER
Refill per VO of Dr. Stef Phillips:    9/21/2023    Future Appointments   Date Time Provider Department Center   4/19/2024  9:20 AM Stef Phillips MD [facility] BS AMB   6/11/2024  9:10 AM Maria Guadalupe Soto, APRN - NP [facility] BS AMB       Requested Prescriptions     Pending Prescriptions Disp Refills    lisinopril (PRINIVIL;ZESTRIL) 10 MG tablet [Pharmacy Med Name: LISINOPRIL 10 MG TABLET] 90 tablet 4     Sig: TAKE ONE-HALF TABLET BY MOUTH TWO (2) TIMES A DAY

## 2024-06-10 ASSESSMENT — SLEEP AND FATIGUE QUESTIONNAIRES
HOW LIKELY ARE YOU TO NOD OFF OR FALL ASLEEP WHILE WATCHING TV: WOULD NEVER DOZE
HAS YOUR MOOD BEEN AFFECTED BECAUSE YOU ARE SLEEPY OR TIRED: NO
DO YOU HAVE DIFFICULTY VISITING YOUR FAMILY OR FRIENDS IN THEIR HOME BECAUSE YOU BECOME SLEEPY OR TIRED: NO
DO YOU HAVE DIFFICULTY WATCHING A MOVIE OR VIDEO BECAUSE YOU BECOME SLEEPY OR TIRED: NO
ESS TOTAL SCORE: 2
HOW LIKELY ARE YOU TO NOD OFF OR FALL ASLEEP WHEN YOU ARE A PASSENGER IN A CAR FOR AN HOUR WITHOUT A BREAK: WOULD NEVER DOZE
HOW LIKELY ARE YOU TO NOD OFF OR FALL ASLEEP IN A CAR, WHILE STOPPED FOR A FEW MINUTES IN TRAFFIC: WOULD NEVER DOZE
DO YOU HAVE DIFFICULTY OPERATING A MOTOR VEHICLE FOR LONG DISTANCES (GREATER THAN 100 MILES) BECAUSE YOU BECOME SLEEPY: NO
HOW LIKELY ARE YOU TO NOD OFF OR FALL ASLEEP WHILE LYING DOWN TO REST IN THE AFTERNOON WHEN CIRCUMSTANCES PERMIT: SLIGHT CHANCE OF DOZING
HAS YOUR RELATIONSHIP WITH FAMILY, FRIENDS OR WORK COLLEAGUES BEEN AFFECTED BECAUSE YOU ARE SLEEPY OR TIRED: NO
DO YOU HAVE DIFFICULTY BEING AS ACTIVE AS YOU WANT TO BE IN THE MORNING BECAUSE YOU ARE SLEEPY OR TIRED: NO
HOW LIKELY ARE YOU TO NOD OFF OR FALL ASLEEP WHILE SITTING AND TALKING TO SOMEONE: WOULD NEVER DOZE
DO YOU HAVE DIFFICULTY CONCENTRATING ON THE THINGS YOU DO BECAUSE YOU ARE SLEEPY OR TIRED: NO
HOW LIKELY ARE YOU TO NOD OFF OR FALL ASLEEP WHILE WATCHING TV: WOULD NEVER DOZE
DO YOU GENERALLY HAVE DIFFICULTY REMEMBERING THINGS BECAUSE YOU ARE SLEEPY OR TIRED: NO
HOW LIKELY ARE YOU TO NOD OFF OR FALL ASLEEP WHILE LYING DOWN TO REST IN THE AFTERNOON WHEN CIRCUMSTANCES PERMIT: SLIGHT CHANCE OF DOZING
HOW LIKELY ARE YOU TO NOD OFF OR FALL ASLEEP WHILE SITTING INACTIVE IN A PUBLIC PLACE: WOULD NEVER DOZE
DO YOU HAVE DIFFICULTY OPERATING A MOTOR VEHICLE FOR SHORT DISTANCES (LESS THAN 100 MILES) BECAUSE YOU BECOME SLEEPY: NO
HOW LIKELY ARE YOU TO NOD OFF OR FALL ASLEEP WHILE SITTING AND TALKING TO SOMEONE: WOULD NEVER DOZE
HOW LIKELY ARE YOU TO NOD OFF OR FALL ASLEEP WHEN YOU ARE A PASSENGER IN A CAR FOR AN HOUR WITHOUT A BREAK: WOULD NEVER DOZE
HOW LIKELY ARE YOU TO NOD OFF OR FALL ASLEEP WHILE SITTING QUIETLY AFTER LUNCH WITHOUT ALCOHOL: WOULD NEVER DOZE
HOW LIKELY ARE YOU TO NOD OFF OR FALL ASLEEP WHILE SITTING AND READING: SLIGHT CHANCE OF DOZING
HOW LIKELY ARE YOU TO NOD OFF OR FALL ASLEEP WHILE SITTING AND READING: SLIGHT CHANCE OF DOZING
HOW LIKELY ARE YOU TO NOD OFF OR FALL ASLEEP IN A CAR, WHILE STOPPED FOR A FEW MINUTES IN TRAFFIC: WOULD NEVER DOZE
HOW LIKELY ARE YOU TO NOD OFF OR FALL ASLEEP WHILE SITTING INACTIVE IN A PUBLIC PLACE: WOULD NEVER DOZE
DO YOU HAVE DIFFICULTY BEING AS ACTIVE AS YOU WANT TO BE IN THE EVENING BECAUSE YOU ARE SLEEPY OR TIRED: NO
HOW LIKELY ARE YOU TO NOD OFF OR FALL ASLEEP WHILE SITTING QUIETLY AFTER LUNCH WITHOUT ALCOHOL: WOULD NEVER DOZE
FOSQ SCORE: 20

## 2024-06-11 ENCOUNTER — OFFICE VISIT (OUTPATIENT)
Age: 64
End: 2024-06-11
Payer: COMMERCIAL

## 2024-06-11 VITALS
HEIGHT: 70 IN | SYSTOLIC BLOOD PRESSURE: 153 MMHG | DIASTOLIC BLOOD PRESSURE: 79 MMHG | WEIGHT: 219 LBS | BODY MASS INDEX: 31.35 KG/M2 | HEART RATE: 56 BPM | OXYGEN SATURATION: 98 %

## 2024-06-11 DIAGNOSIS — G47.33 OBSTRUCTIVE SLEEP APNEA (ADULT) (PEDIATRIC): Primary | ICD-10-CM

## 2024-06-11 DIAGNOSIS — I10 ESSENTIAL (PRIMARY) HYPERTENSION: ICD-10-CM

## 2024-06-11 PROCEDURE — 3077F SYST BP >= 140 MM HG: CPT | Performed by: NURSE PRACTITIONER

## 2024-06-11 PROCEDURE — 99214 OFFICE O/P EST MOD 30 MIN: CPT | Performed by: NURSE PRACTITIONER

## 2024-06-11 PROCEDURE — 3078F DIAST BP <80 MM HG: CPT | Performed by: NURSE PRACTITIONER

## 2024-06-11 NOTE — PATIENT INSTRUCTIONS
[address]  [place] [address]  Tel.  [phone]  Fax. [phone] [address]  [place] [address]  Tel.  [phone]  Fax. [phone] [address] [place] [address]  Tel.  [phone]  Fax. [phone]     Learning About CPAP for Sleep Apnea  What is CPAP?              CPAP is a small machine that you use at home every night while you sleep. It increases air pressure in your throat to keep your airway open. When you have sleep apnea, this can help you sleep better so you feel much better. CPAP stands for \"continuous positive airway pressure.\"  The CPAP machine will have one of the following:  A mask that covers your nose and mouth  Prongs that fit into your nose  A mask that covers your nose only, the most common type. This type is called NCPAP. The N stands for \"nasal.\"  Why is it done?  CPAP is usually the best treatment for obstructive sleep apnea. It is the first treatment choice and the most widely used. Your doctor may suggest CPAP if you have:  Moderate to severe sleep apnea.  Sleep apnea and coronary artery disease (CAD) or heart failure.  How does it help?  CPAP can help you have more normal sleep, so you feel less sleepy and more alert during the daytime.  CPAP may help keep heart failure or other heart problems from getting worse.  NCPAP may help lower your blood pressure.  If you use CPAP, your bed partner may also sleep better because you are not snoring or restless.  What are the side effects?  Some people who use CPAP have:  A dry or stuffy nose and a sore throat.  Irritated skin on the face.  Sore eyes.  Bloating.  If you have any of these problems, work with your doctor to fix them. Here are some things you can try:  Be sure the mask or nasal prongs fit well.  See if your doctor can adjust the pressure of your CPAP.  If your nose is dry, try a humidifier.  If your nose is runny or stuffy, try decongestant medicine or a steroid

## 2024-06-11 NOTE — PROGRESS NOTES
[address]   [place] [address]   Tel.  [phone]   Fax. [phone]  [address]   [place] [address]   Tel.  [phone]   Fax. [phone] [address] [place] [address]   Tel.  [phone]   Fax. [phone]     Pedro Montes De Oca (: 1960) is a 64 y.o. male, established patient, seen for positive airway pressure follow-up and evaluation.  He was last seen by me on 2023, previously seen by Dr. Vijay Núñez on 3/27/2014, prior notes and sleep testing reviewed in detail.  Home sleep test 2014 showed AHI of 59/hr with a lowest SpO2 of 76%.  A  subsequent titration study 2014 showed events responding to CPAP 14 cmH2O. Weight at time of sleep testing 180 pounds.     ASSESSMENT/PLAN:   Diagnosis Orders   1. Obstructive sleep apnea (adult) (pediatric)  DME Order for (Specify) as OP      2. Essential (primary) hypertension        3. Adult BMI 31.0-31.9 kg/sq m            AHI = 59(2014).  On CPAP, Resmed :  14 cmH2O. Set up 4/3/2023.     He is adherent with PAP therapy and PAP continues to benefit patient and remains necessary for control of his sleep apnea.     Follow-up and Dispositions    Return in about 1 year (around 2025) for Annual follow up.         Sleep Apnea well controlled, continue with current pressure CPAP 14 cmH2O.    * Supplies - nasal mask and heated tubing    Orders Placed This Encounter   Procedures    DME Order for (Specify) as OP     Diagnosis: (G47.33) TIA (obstructive sleep apnea)  (primary encounter diagnosis)     Replacement Supplies for Positive Airway Pressure Therapy Device:   Duration of need: 99 months.        Nasal Cushion (Replace) 2 per month.   Nasal Interface Mask 1 every 3 months.   Pos Airway pressure chin strap   Headgear 1 every 6 months.     Tubing with heating element 1 every 3 months.     Filter(s) Disposable 2 per month.   Filter(s) Non-Disposable 1 every 6

## 2024-06-12 ENCOUNTER — CLINICAL DOCUMENTATION (OUTPATIENT)
Age: 64
End: 2024-06-12

## 2024-08-08 ENCOUNTER — OFFICE VISIT (OUTPATIENT)
Age: 64
End: 2024-08-08
Payer: COMMERCIAL

## 2024-08-08 VITALS
HEART RATE: 68 BPM | SYSTOLIC BLOOD PRESSURE: 131 MMHG | OXYGEN SATURATION: 98 % | DIASTOLIC BLOOD PRESSURE: 71 MMHG | HEIGHT: 72 IN | BODY MASS INDEX: 28.04 KG/M2 | WEIGHT: 207 LBS

## 2024-08-08 DIAGNOSIS — I25.119 CORONARY ARTERY DISEASE INVOLVING NATIVE CORONARY ARTERY OF NATIVE HEART WITH ANGINA PECTORIS (HCC): ICD-10-CM

## 2024-08-08 DIAGNOSIS — I25.10 CORONARY ARTERY DISEASE: ICD-10-CM

## 2024-08-08 DIAGNOSIS — I21.11 ST ELEVATION MYOCARDIAL INFARCTION INVOLVING RIGHT CORONARY ARTERY (HCC): ICD-10-CM

## 2024-08-08 DIAGNOSIS — R07.9 CHEST PAIN, UNSPECIFIED TYPE: Primary | ICD-10-CM

## 2024-08-08 DIAGNOSIS — I25.10 ATHEROSCLEROSIS OF NATIVE CORONARY ARTERY OF NATIVE HEART WITHOUT ANGINA PECTORIS: ICD-10-CM

## 2024-08-08 DIAGNOSIS — I10 ESSENTIAL (PRIMARY) HYPERTENSION: ICD-10-CM

## 2024-08-08 PROCEDURE — 3075F SYST BP GE 130 - 139MM HG: CPT | Performed by: INTERNAL MEDICINE

## 2024-08-08 PROCEDURE — 3078F DIAST BP <80 MM HG: CPT | Performed by: INTERNAL MEDICINE

## 2024-08-08 PROCEDURE — 99214 OFFICE O/P EST MOD 30 MIN: CPT | Performed by: INTERNAL MEDICINE

## 2024-08-08 NOTE — PROGRESS NOTES
had concerns including STEMI, Hypertension, Coronary Artery Disease, and Hyperlipidemia.    Vitals:    08/08/24 1136   BP: 131/71   Site: Left Upper Arm   Pulse: 68   SpO2: 98%   Weight: 93.9 kg (207 lb)   Height: 1.829 m (6')         Pt Denies current chest pain    CONCERNED ABOUT HIGH am B/PS, FIRST THING IN THE MORNING

## 2024-08-08 NOTE — PATIENT INSTRUCTIONS
CT chest r and L Lower lobe infiltrate  Nodular formation in RLL with infiltrate   Full hilum contrasted air from lung to heart will Rule out pneumothorax with ct chest    Exercise stress Nuc- Chest pain, CAD    See Dr. Phillips in 6 months.

## 2024-08-08 NOTE — PROGRESS NOTES
Office Follow-up    NAME: Pedro Montes De Oca   :  1960  MRM:  [id number]    Date:  24         Plan:     Chest pain: He has been having symptoms of chest pain, anterior chest wall, retrosternal, non radiating with fatigue from past 2 weeks. Symptoms resolve spontaneously. BP has been high in morning hours. Will proceed with Exercise stress Nuc to further evaluate chest pain symptoms especially with known history of CAD and stents.   CAD status post ST elevation MI in the inferior walls, RCA stent (2017) with residual diagonal and circumflex disease as well as moderate disease in the left main and LAD.  Continue aspirin, Plavix, isosorbide mononitrate and high-dose Lipitor.  Last stress test in  and repeat in  show excellent functional capacity with hypertensive BP response and EKG ischemia but and perfusion defect in the inferior wall which was mostly infarct.   Hypertension: Continue dietary restriction of sodium to less than 2 g/day.  BP today is normal. Home morning BP at times is 160. Asked him to take BP 30-60min after waking. He has lost 7-10lb wt. Continue metoprolol and lisinopril.    Dyslipidemia: Last labs from 2023 reviewed. Has low HDL. Continue Lipitor.   Bradycardia: Monitor. On Metoprolol   TIA- Uses CPAP   See me again in 6 months.    He is a  at [place] [employer].     ATTENTION:   This medical record was transcribed using an electronic medical records/speech recognition system.  Although proofread, it may and can contain electronic, spelling and other errors.  Corrections may be executed at a later time.  Please feel free to contact us for any clarifications as needed.         Subjective:     No chest pain, SOB, palpitations, or dizziness.     ----------------  Pedro Montes De Oca, a 63 y.o. year-old who presents for followup.  He has known history of CAD, stimulation MI, diffuse CAD, moderate disease of the LAD and left main.  Background history of hypertension

## 2024-08-09 NOTE — PROGRESS NOTES
Received VO from Dr. Stef Phillips:    Exercise stress Nuc- Chest pain, CAD     See Dr. Phillips in 6 months.

## 2024-08-28 ENCOUNTER — TELEPHONE (OUTPATIENT)
Age: 64
End: 2024-08-28

## 2024-08-28 NOTE — TELEPHONE ENCOUNTER
[facility] calling to get a clearance for patients surgery , they need to know how many days to hold patients plavix     Phone [phone]  Fax [phone]

## 2024-10-02 NOTE — TELEPHONE ENCOUNTER
Tierra with [facility] calling to get cardiac clearance. Patient is going to have surgery and they would like to know how many days to hold patients Plavix. Also, they need the results of stress test that was completed on yesterday 10/01/2024.      Phone #: [phone]  Fax #: [phone]  #################################    Spoke with  Tierra  identified the pt with name and . I informed her the pt had his stress test yesterday, I'm waiting for the doctor to review for CC.

## 2024-10-02 NOTE — TELEPHONE ENCOUNTER
Tierra with [facility] calling to get cardiac clearance. Patient is going to have surgery and they would like to know how many days to hold patients Plavix. Also, they need the results of stress test that was completed on yesterday 10/01/2024.      Phone #: [phone]  Fax #: [phone]

## 2024-10-03 ENCOUNTER — TELEPHONE (OUTPATIENT)
Age: 64
End: 2024-10-03

## 2024-10-03 DIAGNOSIS — R94.39 ABNORMAL STRESS TEST: Primary | ICD-10-CM

## 2024-10-03 DIAGNOSIS — Z01.818 PRE-OP TESTING: Primary | ICD-10-CM

## 2024-10-03 NOTE — TELEPHONE ENCOUNTER
Spoke with Pt of [facility] schd. For 10/18/24 At 9:15am arrive at 7:45am Pt aware that they need a  they must stay on site NPO from Midnight the night before. Check in at the second floor Outpt. Reg. Desk. Pt is to have Labs done between 10/3/24-10/11/24.   Medications:  Ok to take   VO by /Dr. Phillips/nurse Risa PETTIT

## 2024-10-04 ENCOUNTER — DIRECT ADMIT ORDERS (OUTPATIENT)
Age: 64
End: 2024-10-04

## 2024-10-04 DIAGNOSIS — R94.39 ABNORMAL STRESS TEST: Primary | ICD-10-CM

## 2024-10-04 RX ORDER — SODIUM CHLORIDE 9 MG/ML
INJECTION, SOLUTION INTRAVENOUS PRN
OUTPATIENT
Start: 2024-10-18

## 2024-10-04 RX ORDER — SODIUM CHLORIDE 0.9 % (FLUSH) 0.9 %
5-40 SYRINGE (ML) INJECTION EVERY 12 HOURS SCHEDULED
OUTPATIENT
Start: 2024-10-18 | End: 2024-10-18

## 2024-10-04 RX ORDER — SODIUM CHLORIDE 0.9 % (FLUSH) 0.9 %
5-40 SYRINGE (ML) INJECTION PRN
OUTPATIENT
Start: 2024-10-18

## 2024-10-09 DIAGNOSIS — Z01.818 PRE-OP TESTING: ICD-10-CM

## 2024-10-09 LAB
ANION GAP SERPL CALC-SCNC: 2 MMOL/L (ref 2–12)
BUN SERPL-MCNC: 16 MG/DL (ref 6–20)
BUN/CREAT SERPL: 12 (ref 12–20)
CALCIUM SERPL-MCNC: 9.2 MG/DL (ref 8.5–10.1)
CHLORIDE SERPL-SCNC: 110 MMOL/L (ref 97–108)
CO2 SERPL-SCNC: 29 MMOL/L (ref 21–32)
CREAT SERPL-MCNC: 1.39 MG/DL (ref 0.7–1.3)
ERYTHROCYTE [DISTWIDTH] IN BLOOD BY AUTOMATED COUNT: 12.4 % (ref 11.5–14.5)
GLUCOSE SERPL-MCNC: 129 MG/DL (ref 65–100)
HCT VFR BLD AUTO: 36.1 % (ref 36.6–50.3)
HGB BLD-MCNC: 12.1 G/DL (ref 12.1–17)
MCH RBC QN AUTO: 30.3 PG (ref 26–34)
MCHC RBC AUTO-ENTMCNC: 33.5 G/DL (ref 30–36.5)
MCV RBC AUTO: 90.3 FL (ref 80–99)
NRBC # BLD: 0 K/UL (ref 0–0.01)
NRBC BLD-RTO: 0 PER 100 WBC
PLATELET # BLD AUTO: 174 K/UL (ref 150–400)
PMV BLD AUTO: 10.1 FL (ref 8.9–12.9)
POTASSIUM SERPL-SCNC: 4.5 MMOL/L (ref 3.5–5.1)
RBC # BLD AUTO: 4 M/UL (ref 4.1–5.7)
SODIUM SERPL-SCNC: 141 MMOL/L (ref 136–145)
WBC # BLD AUTO: 5.1 K/UL (ref 4.1–11.1)

## 2024-10-14 RX ORDER — CLOPIDOGREL BISULFATE 75 MG/1
75 TABLET ORAL DAILY
Qty: 90 TABLET | Refills: 2 | Status: SHIPPED | OUTPATIENT
Start: 2024-10-14

## 2024-10-14 RX ORDER — LISINOPRIL 10 MG/1
TABLET ORAL
Qty: 90 TABLET | Refills: 2 | Status: SHIPPED | OUTPATIENT
Start: 2024-10-14

## 2024-10-18 ENCOUNTER — HOSPITAL ENCOUNTER (OUTPATIENT)
Facility: HOSPITAL | Age: 64
Setting detail: OUTPATIENT SURGERY
Discharge: HOME | End: 2024-10-18
Attending: STUDENT IN AN ORGANIZED HEALTH CARE EDUCATION/TRAINING PROGRAM
Payer: COMMERCIAL

## 2024-10-18 VITALS
RESPIRATION RATE: 12 BRPM | TEMPERATURE: 98.1 F | BODY MASS INDEX: 28.89 KG/M2 | WEIGHT: 213.3 LBS | OXYGEN SATURATION: 98 % | SYSTOLIC BLOOD PRESSURE: 116 MMHG | HEIGHT: 72 IN | HEART RATE: 65 BPM | DIASTOLIC BLOOD PRESSURE: 62 MMHG

## 2024-10-18 DIAGNOSIS — R94.39 ABNORMAL STRESS TEST: ICD-10-CM

## 2024-10-18 LAB
CHOLEST SERPL-MCNC: 97 MG/DL
ECHO BSA: 2.22 M2
HDLC SERPL-MCNC: 31 MG/DL
HDLC SERPL: 3.1 (ref 0–5)
LDLC SERPL CALC-MCNC: 42 MG/DL (ref 0–100)
TRIGL SERPL-MCNC: 120 MG/DL
VLDLC SERPL CALC-MCNC: 24 MG/DL

## 2024-10-18 PROCEDURE — C1753 CATH, INTRAVAS ULTRASOUND: HCPCS | Performed by: STUDENT IN AN ORGANIZED HEALTH CARE EDUCATION/TRAINING PROGRAM

## 2024-10-18 PROCEDURE — 93458 L HRT ARTERY/VENTRICLE ANGIO: CPT | Performed by: STUDENT IN AN ORGANIZED HEALTH CARE EDUCATION/TRAINING PROGRAM

## 2024-10-18 PROCEDURE — 82172 ASSAY OF APOLIPOPROTEIN: CPT

## 2024-10-18 PROCEDURE — 99152 MOD SED SAME PHYS/QHP 5/>YRS: CPT | Performed by: STUDENT IN AN ORGANIZED HEALTH CARE EDUCATION/TRAINING PROGRAM

## 2024-10-18 PROCEDURE — 92979 ENDOLUMINL IVUS OCT C EA: CPT | Performed by: STUDENT IN AN ORGANIZED HEALTH CARE EDUCATION/TRAINING PROGRAM

## 2024-10-18 PROCEDURE — 80061 LIPID PANEL: CPT

## 2024-10-18 PROCEDURE — 2500000003 HC RX 250 WO HCPCS: Performed by: STUDENT IN AN ORGANIZED HEALTH CARE EDUCATION/TRAINING PROGRAM

## 2024-10-18 PROCEDURE — C1887 CATHETER, GUIDING: HCPCS | Performed by: STUDENT IN AN ORGANIZED HEALTH CARE EDUCATION/TRAINING PROGRAM

## 2024-10-18 PROCEDURE — 6360000004 HC RX CONTRAST MEDICATION: Performed by: STUDENT IN AN ORGANIZED HEALTH CARE EDUCATION/TRAINING PROGRAM

## 2024-10-18 PROCEDURE — 99153 MOD SED SAME PHYS/QHP EA: CPT | Performed by: STUDENT IN AN ORGANIZED HEALTH CARE EDUCATION/TRAINING PROGRAM

## 2024-10-18 PROCEDURE — C1769 GUIDE WIRE: HCPCS | Performed by: STUDENT IN AN ORGANIZED HEALTH CARE EDUCATION/TRAINING PROGRAM

## 2024-10-18 PROCEDURE — C1894 INTRO/SHEATH, NON-LASER: HCPCS | Performed by: STUDENT IN AN ORGANIZED HEALTH CARE EDUCATION/TRAINING PROGRAM

## 2024-10-18 PROCEDURE — 2709999900 HC NON-CHARGEABLE SUPPLY: Performed by: STUDENT IN AN ORGANIZED HEALTH CARE EDUCATION/TRAINING PROGRAM

## 2024-10-18 PROCEDURE — 36415 COLL VENOUS BLD VENIPUNCTURE: CPT

## 2024-10-18 PROCEDURE — 76937 US GUIDE VASCULAR ACCESS: CPT | Performed by: STUDENT IN AN ORGANIZED HEALTH CARE EDUCATION/TRAINING PROGRAM

## 2024-10-18 PROCEDURE — 92978 ENDOLUMINL IVUS OCT C 1ST: CPT | Performed by: STUDENT IN AN ORGANIZED HEALTH CARE EDUCATION/TRAINING PROGRAM

## 2024-10-18 PROCEDURE — 6360000002 HC RX W HCPCS: Performed by: STUDENT IN AN ORGANIZED HEALTH CARE EDUCATION/TRAINING PROGRAM

## 2024-10-18 RX ORDER — ACETAMINOPHEN 325 MG/1
650 TABLET ORAL EVERY 4 HOURS PRN
Status: DISCONTINUED | OUTPATIENT
Start: 2024-10-18 | End: 2024-10-18 | Stop reason: HOSPADM

## 2024-10-18 RX ORDER — SODIUM CHLORIDE 0.9 % (FLUSH) 0.9 %
5-40 SYRINGE (ML) INJECTION PRN
Status: DISCONTINUED | OUTPATIENT
Start: 2024-10-18 | End: 2024-10-18 | Stop reason: HOSPADM

## 2024-10-18 RX ORDER — IOPAMIDOL 755 MG/ML
INJECTION, SOLUTION INTRAVASCULAR PRN
Status: DISCONTINUED | OUTPATIENT
Start: 2024-10-18 | End: 2024-10-18 | Stop reason: HOSPADM

## 2024-10-18 RX ORDER — VERAPAMIL HYDROCHLORIDE 2.5 MG/ML
INJECTION, SOLUTION INTRAVENOUS PRN
Status: DISCONTINUED | OUTPATIENT
Start: 2024-10-18 | End: 2024-10-18 | Stop reason: HOSPADM

## 2024-10-18 RX ORDER — SODIUM CHLORIDE 0.9 % (FLUSH) 0.9 %
5-40 SYRINGE (ML) INJECTION EVERY 12 HOURS SCHEDULED
Status: DISCONTINUED | OUTPATIENT
Start: 2024-10-18 | End: 2024-10-18 | Stop reason: HOSPADM

## 2024-10-18 RX ORDER — HEPARIN SODIUM 200 [USP'U]/100ML
INJECTION, SOLUTION INTRAVENOUS PRN
Status: DISCONTINUED | OUTPATIENT
Start: 2024-10-18 | End: 2024-10-18 | Stop reason: HOSPADM

## 2024-10-18 RX ORDER — LIDOCAINE HYDROCHLORIDE 10 MG/ML
INJECTION, SOLUTION INFILTRATION; PERINEURAL PRN
Status: DISCONTINUED | OUTPATIENT
Start: 2024-10-18 | End: 2024-10-18 | Stop reason: HOSPADM

## 2024-10-18 RX ORDER — HEPARIN SODIUM 1000 [USP'U]/ML
INJECTION, SOLUTION INTRAVENOUS; SUBCUTANEOUS PRN
Status: DISCONTINUED | OUTPATIENT
Start: 2024-10-18 | End: 2024-10-18 | Stop reason: HOSPADM

## 2024-10-18 RX ORDER — FENTANYL CITRATE 50 UG/ML
INJECTION, SOLUTION INTRAMUSCULAR; INTRAVENOUS PRN
Status: DISCONTINUED | OUTPATIENT
Start: 2024-10-18 | End: 2024-10-18 | Stop reason: HOSPADM

## 2024-10-18 RX ORDER — EZETIMIBE 10 MG/1
10 TABLET ORAL DAILY
Qty: 30 TABLET | Refills: 3 | Status: SHIPPED | OUTPATIENT
Start: 2024-10-18

## 2024-10-18 RX ORDER — SODIUM CHLORIDE 9 MG/ML
INJECTION, SOLUTION INTRAVENOUS PRN
Status: DISCONTINUED | OUTPATIENT
Start: 2024-10-18 | End: 2024-10-18 | Stop reason: HOSPADM

## 2024-10-18 RX ORDER — MIDAZOLAM HYDROCHLORIDE 1 MG/ML
INJECTION, SOLUTION INTRAMUSCULAR; INTRAVENOUS PRN
Status: DISCONTINUED | OUTPATIENT
Start: 2024-10-18 | End: 2024-10-18 | Stop reason: HOSPADM

## 2024-10-18 NOTE — PROCEDURES
PROCEDURE NOTE  Date: 10/18/2024   Name: Pedro Montes De Oca  YOB: 1960    Procedures        BRIEF PROCEDURE NOTE    Date of Procedure: 10/18/2024   Preoperative Diagnosis: abnormal stress test  Postoperative Diagnosis: multi-vessel CAD    Procedure: Left heart cath, coronary angiography, moderate sedation, ivus lad, ivus left main, ivus lcx  Interventional Cardiologist: Porfirio Adan DO  Assistant: None  Anesthesia: local + IV moderate sedation   I administered moderate sedation throughout this procedure. An independent trained observer pushed medications at my direction, and monitored the patient’s level of consciousness and physiological status throughout.  Estimated Blood Loss: Minimal    Access: right radial artery, 6F  Catheters:  Left coronary:JL 3.5, 5f  Right coronary: JR 4, 5f    Findings:   L Main:large caliber vessel, ivus with heavy plaque burden with MLA ~ 6mm2  LAD:large caliber, diffuse moderate to severe disease with ivus proximal lad demonstrating  mla ~ 4mms, moderate diagonal with severe diffuse disease  LCx: large caliber, diffuse moderate to severe disease of proximal through av groove into small distal Lcx, moderate om1 with severe proximal 90%  RCA: large caliber, diffuse moderate ISR of proximal RCA disease, diffuse mild to moderate distal rca disease, moderate pda and moderate pl branch with diffuse moderate disease    LVEDP:  < 10 mmhg            Specimens Removed: None    Implants: n/a    Closure Device: radial TR band    See full cath note.    Complications: none      Findings:  1.  Severe left main disease by IVUS with MLA 6mm2  2.  Diffuse disease of lad with MLA 4mm2 by ivus without critical stenosis  3. Critical stenosis of OM1  4. Moderate isr of RCA stents  5. Normal lvedp    Plan:  CABG evaluation  Repeat lipids, apo B 100, lp(a)  Add zetia 10mg/d, pending above testing may benefit from pcsk9    DO Porfirio Carrasco DO  [address] [facility]

## 2024-10-18 NOTE — PROGRESS NOTES
8:20 AM  Patient brought back from waiting area. ID band and allergies confirmed. Consents signed.     8:48 AM  TRANSFER - OUT REPORT:    Verbal report given to Karrie Montes De Oca  being transferred to Cath Lab for ordered procedure       Report consisted of patient's Situation, Background, Assessment and   Recommendations(SBAR).     Information from the following report(s) Nurse Handoff Report was reviewed with the receiving nurse.           Lines:   Peripheral IV 10/18/24 Right Antecubital (Active)        Opportunity for questions and clarification was provided.      Patient transported with:  Registered Nurse      on patient's arrival to unit and care assumed.      9:42 AM  TRANSFER - IN REPORT:    Verbal report received from Ruth Montes De Oca  being received from Cath Lab for routine post-op      Report consisted of patient's Situation, Background, Assessment and   Recommendations(SBAR).     Information from the following report(s) Nurse Handoff Report was reviewed with the receiving nurse.    Opportunity for questions and clarification was provided.      Assessment completed upon patient's arrival to unit and care assumed.      11:40 AM  Attempted to remove 2 ml's of air from patients right radial TR Band. Patient began to bleed immediately. 2 ml;s of air returned to radial band. Will wait 30 minutes and attempt to remove air again. Will continue to monitor.     12:05 PM  Discharge instructions reviewed with patient and family. Voiced understanding. Patient given copy of discharge instructions to take home.          1:43 PM  Patient ambulates on unit. No dizziness or gait disturbances.     2:06 PM  Pt discharged via wheelchair with wife, Nicole. Personal belongings with patient upon discharge.

## 2024-10-18 NOTE — H&P
Porfirio Adan DO  [facility]  [address], [address]  [place] [address]                                       Office [phone],Fax [phone]    Pre- Cardiac Catheterization Procedure Note    Procedure:  cardiac catheterization  Preprocedure diagnosis:  abnormal stress test  Preprocedure testing:   10/01/24    NM STRESS TEST WITH MYOCARDIAL PERFUSION 10/02/2024  1:49 PM (Final)    Interpretation Summary    Stress Function: Left ventricular function post-stress is normal. Post-stress ejection fraction is 51%.    Abnormal stress nuclear perfusion with medium size moderate intensity perfusion defect of the base and mid inferior and inferolateral wall on stress testing which is reversible on resting images. SDS 10, SSS 10, SRS 0. There is possible diaphragmatic attenuation. Normal LVEF 51%.    EKG changes suggestive of ischemia with 2.5mm ST depression in inferior and inferolateral leads at peak exercise along with ST elevation of 1 mm in aVL and V1.    Excellent functional capacity.    Signed by: Stef Vaughn MD on 10/2/2024  1:49 PM      History of Presenting Illness:  See dr vaughn note    Review of System:  Constitutional: neg  Resp: neg  Card: neg  Neuro: neg      No current facility-administered medications on file prior to encounter.     Current Outpatient Medications on File Prior to Encounter   Medication Sig Dispense Refill    atorvastatin (LIPITOR) 80 MG tablet TAKE 1 TABLET BY MOUTH NIGHTLY 90 tablet 4    metoprolol tartrate (LOPRESSOR) 25 MG tablet TAKE 1/2 TABLET BY MOUTH TWICE A DAY 90 tablet 4    isosorbide mononitrate (IMDUR) 30 MG extended release tablet TAKE 1 TABLET BY MOUTH EVERY DAY 90 tablet 4    aspirin 81 MG chewable tablet Take 1 tablet by mouth daily         Allergies   Allergen Reactions    Ampicillin Other (See Comments)     \"spots throughout body\"       Physican Exam:    There were no vitals taken for this  visit.    Constitutional:  well-developed and well-nourished. No distress.  Pulmonary/Chest: Effort normal and breath sounds normal. No respiratory distress, wheezes or rales.  Cardiovascular: Normal rate, regular rhythm, S1 S2 .   Neurological:  Alert and oriented. Coordination seems grossly normal.   Psychiatric:  Good insight into underlying medical condition.    ASA: 3  Mallampati: 3    Plan:    Risk and benefit of cardiac catheterization/PCI was described in detail to patient.  (risk of vascular access complication with potential surgical intervention for management, stroke, myocardial infarction, emergent open heart surgery and death)    Informed consent was obtained.  Patient wishes to proceed with invasive study with potential percutaneous treatment.

## 2024-10-20 LAB — APO B SERPL-MCNC: 65 MG/DL

## 2024-10-22 ENCOUNTER — INITIAL CONSULT (OUTPATIENT)
Age: 64
End: 2024-10-22
Payer: COMMERCIAL

## 2024-10-22 VITALS
TEMPERATURE: 98.2 F | SYSTOLIC BLOOD PRESSURE: 159 MMHG | OXYGEN SATURATION: 97 % | DIASTOLIC BLOOD PRESSURE: 91 MMHG | HEART RATE: 69 BPM | WEIGHT: 217 LBS | BODY MASS INDEX: 29.43 KG/M2

## 2024-10-22 DIAGNOSIS — I25.10 CAD IN NATIVE ARTERY: Primary | ICD-10-CM

## 2024-10-22 LAB — LPA SERPL-SCNC: 112.1 NMOL/L

## 2024-10-22 PROCEDURE — 99205 OFFICE O/P NEW HI 60 MIN: CPT | Performed by: NURSE PRACTITIONER

## 2024-10-22 RX ORDER — CHOLECALCIFEROL (VITAMIN D3) 1250 MCG
CAPSULE ORAL
COMMUNITY

## 2024-10-22 NOTE — PROGRESS NOTES
Cardiac Surgery   History and Physical  Referring cardiologist: Dr. Adan  Primary cardiologist: Dr. Phillips  PCP: Dr. Montez  CC: positive nuclear stress test.  Asymptomatic.   Diagnosis: severe coronary artery disease  Consulting surgeon:   Subjective:      Pedro Montes De Oca is a 64 y.o. man with a history of prior PCI/JUVENTINO to RCA in 2017, STEMI 2017, CKD?, HLD, HTN, TIA with CPAP, bradycardia on metoprolol  who was referred for cardiac surgery evaluation.  He denies any chest pains or dyspnea.  He had a follow up nuclear stress test due to the MI and stent in 2017.  It was indicative of ischemia. Subsequent cardiac catheterization showed significant coronary artery disease.     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  He is a former smoker of cigars (quit regular use about 25 years ago). He drinks about one or less  alcoholic drink a week.  He walks 8000 steps a day.     Cardiac Testing    Cardiac catheterization:  Conclusion         Severe left main disease by IVUS with MLA 6mm2    Diffuse disease of lad with MLA 4mm2 by ivus without critical stenosis    Critical stenosis of OM1    Moderate isr of RCA stents    Normal lvedp     Findings:   L Main:large caliber vessel, ivus with heavy plaque burden with MLA ~ 6mm2  LAD:large caliber, diffuse moderate to severe disease with ivus proximal lad demonstrating  mla ~ 4mms, moderate diagonal with severe diffuse disease  LCx: large caliber, diffuse moderate to severe disease of proximal through av groove into small distal Lcx, moderate om1 with severe proximal 90%  RCA: large caliber, diffuse moderate ISR of proximal RCA disease, diffuse mild to moderate distal rca disease, moderate pda and moderate pl branch with diffuse moderate disease     LVEDP:  < 10 mmhg        Plan:  CABG evaluation  Repeat lipids, apo B 100, lp(a)  Add zetia 10mg/d, pending above testing may benefit from pcsk9    ECHO: pending    Past Medical History:   Diagnosis Date    Coronary

## 2024-10-24 ENCOUNTER — PREP FOR PROCEDURE (OUTPATIENT)
Age: 64
End: 2024-10-24

## 2024-10-24 DIAGNOSIS — Z01.818 PREOP EXAMINATION: Primary | ICD-10-CM

## 2024-10-24 DIAGNOSIS — I25.10 DISEASE OF CARDIOVASCULAR SYSTEM: ICD-10-CM

## 2024-10-24 NOTE — PROGRESS NOTES
I called the patient.  His OR date is Nov 12.  He said he is right handed.  I ordered an Walter's test per Dr. Matson's request.

## 2024-10-24 NOTE — PERIOP NOTE
Message  Received: Today  Melina Nascimento [place]; Dion Nascimento  REQUEST PAT 11/6 8am ONLY SURGERY 11/12          PAT: 11-6 @ 8;00 SCHEDULED THROUGH \"[facility]\" BY MELINA

## 2024-10-24 NOTE — PROGRESS NOTES
Pt seen on 10/22/24    Pt seen and HPI reviewed with pt   Full note reviewed and confirmed  He has significant L main and multi vessel CAD with previous stent of RCA with some recurrent disease  Echo reviewed  Indications and risks of cabg discussed and questions answered

## 2024-10-30 ENCOUNTER — TELEPHONE (OUTPATIENT)
Age: 64
End: 2024-10-30

## 2024-10-30 NOTE — TELEPHONE ENCOUNTER
POWER received from Dr. JOSE L Adan DO:  Once he starts Repatha, change Lipitor to Crestor 20mg po daily.     ##################################    Unable to reach pt. Message left with HIPPA compliant message and call back number

## 2024-10-31 ENCOUNTER — TELEPHONE (OUTPATIENT)
Age: 64
End: 2024-10-31

## 2024-10-31 NOTE — TELEPHONE ENCOUNTER
Spoke with the pt, identified the pt with name and . I asked the pt if Dr. Adan had talked to him about Repatha. Pt stated yes, he's already started taking it. I asked him to reduce his Lipitor to 20 mg along with the Repatha. I also advised the pt that the repatha was not in his chart, so I didn't know he had already started it. Pt said he was currently in [place] and when he gets jean pierre he will get his meds out and be in touch about this order. I agreed    I will change his chart to reflect what has been reported, and when the pt calls back I will send In the appropriate orders to the pharmacy

## 2024-11-01 ENCOUNTER — TELEPHONE (OUTPATIENT)
Age: 64
End: 2024-11-01

## 2024-11-01 NOTE — TELEPHONE ENCOUNTER
Patient requested to speak with the nurse, in regards to medication changes.       Pt# [phone]     ##################################    Spoke with the pt, identified the pt with name and . Pt is calling me back to clarify the medication changes. He said at discharge Dr. Adan told him about Zetia, and he has been taking this. I asked if he knew anything about Repatha, and briefly explained what it is and how it's taken, pt said he had never   heard anything about this.   Pt then proceeded to tell me that the reason he was so frazzled yesterday was he got news that his father had passed away and he's got a million things to do including a trial to  coming up. He was upset about not understanding why Dr. Adan wanted to make these changes.  I suggested for him just to continue his medications (Lipitor 80 & zetia 10) for now. When Dr. Adan gets back from vacation I will have him reach out to Pedro to help him understand what's going on. Pt was grateful and agreed this was the best thing for now.

## 2024-11-01 NOTE — TELEPHONE ENCOUNTER
Patient requested to speak with the nurse, in regards to medication changes.       Pt# [phone]

## 2024-11-02 NOTE — PROGRESS NOTES
Patient has no cardiac complaints today       Visit Vitals  /78 (BP 1 Location: Right arm, BP Patient Position: Sitting)   Pulse 65   Ht 5' 10\" (1.778 m)   Wt 208 lb (94.3 kg)   SpO2 98%   BMI 29.84 kg/m² normal mucosa

## 2024-11-05 RX ORDER — SODIUM CHLORIDE 0.9 % (FLUSH) 0.9 %
5-40 SYRINGE (ML) INJECTION PRN
Status: CANCELLED | OUTPATIENT
Start: 2024-11-05

## 2024-11-05 RX ORDER — SODIUM CHLORIDE 0.9 % (FLUSH) 0.9 %
5-40 SYRINGE (ML) INJECTION EVERY 12 HOURS SCHEDULED
Status: CANCELLED | OUTPATIENT
Start: 2024-11-05

## 2024-11-05 RX ORDER — SODIUM CHLORIDE 9 MG/ML
INJECTION, SOLUTION INTRAVENOUS PRN
Status: CANCELLED | OUTPATIENT
Start: 2024-11-05

## 2024-11-05 RX ORDER — METOPROLOL TARTRATE 25 MG/1
12.5 TABLET, FILM COATED ORAL ONCE
Status: CANCELLED | OUTPATIENT
Start: 2024-11-05 | End: 2024-11-05

## 2024-11-06 ENCOUNTER — HOSPITAL ENCOUNTER (OUTPATIENT)
Facility: HOSPITAL | Age: 64
Discharge: HOME | End: 2024-11-09
Payer: COMMERCIAL

## 2024-11-06 ENCOUNTER — HOSPITAL ENCOUNTER (OUTPATIENT)
Facility: HOSPITAL | Age: 64
Discharge: HOME | End: 2024-11-08
Payer: COMMERCIAL

## 2024-11-06 ENCOUNTER — OFFICE VISIT (OUTPATIENT)
Age: 64
End: 2024-11-06

## 2024-11-06 VITALS
SYSTOLIC BLOOD PRESSURE: 134 MMHG | WEIGHT: 210.98 LBS | HEIGHT: 70 IN | BODY MASS INDEX: 30.2 KG/M2 | DIASTOLIC BLOOD PRESSURE: 81 MMHG

## 2024-11-06 VITALS
SYSTOLIC BLOOD PRESSURE: 134 MMHG | TEMPERATURE: 98.4 F | HEART RATE: 60 BPM | HEIGHT: 70 IN | DIASTOLIC BLOOD PRESSURE: 81 MMHG | BODY MASS INDEX: 30.21 KG/M2 | WEIGHT: 211 LBS

## 2024-11-06 DIAGNOSIS — I25.10 CAD IN NATIVE ARTERY: ICD-10-CM

## 2024-11-06 DIAGNOSIS — I25.10 DISEASE OF CARDIOVASCULAR SYSTEM: ICD-10-CM

## 2024-11-06 DIAGNOSIS — Z01.818 PREOP EXAMINATION: ICD-10-CM

## 2024-11-06 DIAGNOSIS — I25.119 CORONARY ARTERY DISEASE INVOLVING NATIVE CORONARY ARTERY OF NATIVE HEART WITH ANGINA PECTORIS (HCC): Primary | ICD-10-CM

## 2024-11-06 LAB
ABO + RH BLD: NORMAL
ALBUMIN SERPL-MCNC: 3.8 G/DL (ref 3.5–5)
ALBUMIN/GLOB SERPL: 1.4 (ref 1.1–2.2)
ALP SERPL-CCNC: 139 U/L (ref 45–117)
ALT SERPL-CCNC: 35 U/L (ref 12–78)
ANION GAP SERPL CALC-SCNC: 4 MMOL/L (ref 2–12)
APPEARANCE UR: CLEAR
APTT PPP: 26.7 SEC (ref 22.1–31)
ARTERIAL PATENCY WRIST A: POSITIVE
AST SERPL-CCNC: 17 U/L (ref 15–37)
BACTERIA URNS QL MICRO: NEGATIVE /HPF
BASE EXCESS BLD CALC-SCNC: 0.5 MMOL/L
BASOPHILS # BLD: 0 K/UL (ref 0–0.1)
BASOPHILS NFR BLD: 1 % (ref 0–1)
BDY SITE: ABNORMAL
BILIRUB SERPL-MCNC: 0.8 MG/DL (ref 0.2–1)
BILIRUB UR QL: NEGATIVE
BLOOD GROUP ANTIBODIES SERPL: NORMAL
BUN SERPL-MCNC: 18 MG/DL (ref 6–20)
BUN/CREAT SERPL: 13 (ref 12–20)
CALCIUM SERPL-MCNC: 8.8 MG/DL (ref 8.5–10.1)
CHLORIDE SERPL-SCNC: 108 MMOL/L (ref 97–108)
CO2 SERPL-SCNC: 29 MMOL/L (ref 21–32)
COLOR UR: ABNORMAL
CREAT SERPL-MCNC: 1.4 MG/DL (ref 0.7–1.3)
DIFFERENTIAL METHOD BLD: ABNORMAL
ECHO AO ROOT DIAM: 3.4 CM
ECHO AO ROOT INDEX: 1.59 CM/M2
ECHO AV AREA PEAK VELOCITY: 3.4 CM2
ECHO AV AREA VTI: 3.1 CM2
ECHO AV AREA/BSA PEAK VELOCITY: 1.6 CM2/M2
ECHO AV AREA/BSA VTI: 1.4 CM2/M2
ECHO AV MEAN GRADIENT: 4 MMHG
ECHO AV MEAN VELOCITY: 1 M/S
ECHO AV PEAK GRADIENT: 6 MMHG
ECHO AV PEAK VELOCITY: 1.2 M/S
ECHO AV VELOCITY RATIO: 0.92
ECHO AV VTI: 29.9 CM
ECHO BSA: 2.17 M2
ECHO EST RA PRESSURE: 8 MMHG
ECHO LA DIAMETER INDEX: 1.64 CM/M2
ECHO LA DIAMETER: 3.5 CM
ECHO LA TO AORTIC ROOT RATIO: 1.03
ECHO LV E' LATERAL VELOCITY: 6.3 CM/S
ECHO LV E' SEPTAL VELOCITY: 12.9 CM/S
ECHO LV EF PHYSICIAN: 55 %
ECHO LV FRACTIONAL SHORTENING: 27 % (ref 28–44)
ECHO LV INTERNAL DIMENSION DIASTOLE INDEX: 2.43 CM/M2
ECHO LV INTERNAL DIMENSION DIASTOLIC: 5.2 CM (ref 4.2–5.9)
ECHO LV INTERNAL DIMENSION SYSTOLIC INDEX: 1.78 CM/M2
ECHO LV INTERNAL DIMENSION SYSTOLIC: 3.8 CM
ECHO LV IVSD: 1.1 CM (ref 0.6–1)
ECHO LV MASS 2D: 220.8 G (ref 88–224)
ECHO LV MASS INDEX 2D: 103.2 G/M2 (ref 49–115)
ECHO LV POSTERIOR WALL DIASTOLIC: 1.1 CM (ref 0.6–1)
ECHO LV RELATIVE WALL THICKNESS RATIO: 0.42
ECHO LVOT AREA: 3.8 CM2
ECHO LVOT AV VTI INDEX: 0.81
ECHO LVOT DIAM: 2.2 CM
ECHO LVOT MEAN GRADIENT: 3 MMHG
ECHO LVOT PEAK GRADIENT: 5 MMHG
ECHO LVOT PEAK VELOCITY: 1.1 M/S
ECHO LVOT STROKE VOLUME INDEX: 43 ML/M2
ECHO LVOT SV: 91.9 ML
ECHO LVOT VTI: 24.2 CM
ECHO MV A VELOCITY: 0.91 M/S
ECHO MV E VELOCITY: 0.81 M/S
ECHO MV E/A RATIO: 0.89
ECHO MV E/E' LATERAL: 12.86
ECHO MV E/E' RATIO (AVERAGED): 9.57
ECHO MV E/E' SEPTAL: 6.28
ECHO PV MAX VELOCITY: 1 M/S
ECHO PV PEAK GRADIENT: 4 MMHG
ECHO RIGHT VENTRICULAR SYSTOLIC PRESSURE (RVSP): 18 MMHG
ECHO RV FREE WALL PEAK S': 18.5 CM/S
ECHO RV TAPSE: 1.7 CM (ref 1.7–?)
ECHO TV REGURGITANT MAX VELOCITY: 1.62 M/S
ECHO TV REGURGITANT PEAK GRADIENT: 10 MMHG
EKG ATRIAL RATE: 58 BPM
EKG DIAGNOSIS: NORMAL
EKG P AXIS: 56 DEGREES
EKG P-R INTERVAL: 196 MS
EKG Q-T INTERVAL: 434 MS
EKG QRS DURATION: 98 MS
EKG QTC CALCULATION (BAZETT): 426 MS
EKG R AXIS: 20 DEGREES
EKG T AXIS: 5 DEGREES
EKG VENTRICULAR RATE: 58 BPM
EOSINOPHIL # BLD: 0.2 K/UL (ref 0–0.4)
EOSINOPHIL NFR BLD: 5 % (ref 0–7)
EPITH CASTS URNS QL MICRO: ABNORMAL /LPF
ERYTHROCYTE [DISTWIDTH] IN BLOOD BY AUTOMATED COUNT: 12.8 % (ref 11.5–14.5)
EST. AVERAGE GLUCOSE BLD GHB EST-MCNC: 140 MG/DL
GAS FLOW.O2 O2 DELIVERY SYS: ABNORMAL
GLOBULIN SER CALC-MCNC: 2.8 G/DL (ref 2–4)
GLUCOSE SERPL-MCNC: 145 MG/DL (ref 65–100)
GLUCOSE UR STRIP.AUTO-MCNC: NEGATIVE MG/DL
HBA1C MFR BLD: 6.5 % (ref 4–5.6)
HCO3 BLD-SCNC: 24.5 MMOL/L (ref 21–28)
HCT VFR BLD AUTO: 36.1 % (ref 36.6–50.3)
HGB BLD-MCNC: 12.1 G/DL (ref 12.1–17)
HGB UR QL STRIP: ABNORMAL
HISTORY CHECK: NORMAL
HYALINE CASTS URNS QL MICRO: ABNORMAL /LPF (ref 0–5)
IMM GRANULOCYTES # BLD AUTO: 0 K/UL (ref 0–0.04)
IMM GRANULOCYTES NFR BLD AUTO: 0 % (ref 0–0.5)
INR PPP: 1 (ref 0.9–1.1)
KETONES UR QL STRIP.AUTO: NEGATIVE MG/DL
LEUKOCYTE ESTERASE UR QL STRIP.AUTO: NEGATIVE
LYMPHOCYTES # BLD: 0.9 K/UL (ref 0.8–3.5)
LYMPHOCYTES NFR BLD: 19 % (ref 12–49)
MAGNESIUM SERPL-MCNC: 2.1 MG/DL (ref 1.6–2.4)
MCH RBC QN AUTO: 30.6 PG (ref 26–34)
MCHC RBC AUTO-ENTMCNC: 33.5 G/DL (ref 30–36.5)
MCV RBC AUTO: 91.4 FL (ref 80–99)
MONOCYTES # BLD: 0.4 K/UL (ref 0–1)
MONOCYTES NFR BLD: 10 % (ref 5–13)
NEUTS SEG # BLD: 3 K/UL (ref 1.8–8)
NEUTS SEG NFR BLD: 65 % (ref 32–75)
NITRITE UR QL STRIP.AUTO: NEGATIVE
NRBC # BLD: 0 K/UL (ref 0–0.01)
NRBC BLD-RTO: 0 PER 100 WBC
NT PRO BNP: 118 PG/ML
O2/TOTAL GAS SETTING VFR VENT: 21 %
PCO2 BLD: 36.5 MMHG (ref 35–48)
PH BLD: 7.43 (ref 7.35–7.45)
PH UR STRIP: 6 (ref 5–8)
PLATELET # BLD AUTO: 179 K/UL (ref 150–400)
PMV BLD AUTO: 9.7 FL (ref 8.9–12.9)
PO2 BLD: 76 MMHG (ref 83–108)
POTASSIUM SERPL-SCNC: 4.1 MMOL/L (ref 3.5–5.1)
PROT SERPL-MCNC: 6.6 G/DL (ref 6.4–8.2)
PROT UR STRIP-MCNC: NEGATIVE MG/DL
PROTHROMBIN TIME: 10.2 SEC (ref 9–11.1)
RBC # BLD AUTO: 3.95 M/UL (ref 4.1–5.7)
RBC #/AREA URNS HPF: ABNORMAL /HPF (ref 0–5)
SAO2 % BLD: 95.6 % (ref 92–97)
SODIUM SERPL-SCNC: 141 MMOL/L (ref 136–145)
SP GR UR REFRACTOMETRY: 1.01 (ref 1–1.03)
SPECIMEN EXP DATE BLD: NORMAL
SPECIMEN TYPE: ABNORMAL
THERAPEUTIC RANGE: NORMAL SECS (ref 58–77)
TSH SERPL DL<=0.05 MIU/L-ACNC: 3.19 UIU/ML (ref 0.36–3.74)
URINE CULTURE IF INDICATED: ABNORMAL
UROBILINOGEN UR QL STRIP.AUTO: 0.2 EU/DL (ref 0.2–1)
WBC # BLD AUTO: 4.6 K/UL (ref 4.1–11.1)
WBC URNS QL MICRO: ABNORMAL /HPF (ref 0–4)

## 2024-11-06 PROCEDURE — 93005 ELECTROCARDIOGRAM TRACING: CPT

## 2024-11-06 PROCEDURE — 85025 COMPLETE CBC W/AUTO DIFF WBC: CPT

## 2024-11-06 PROCEDURE — 86901 BLOOD TYPING SEROLOGIC RH(D): CPT

## 2024-11-06 PROCEDURE — 82803 BLOOD GASES ANY COMBINATION: CPT

## 2024-11-06 PROCEDURE — 83036 HEMOGLOBIN GLYCOSYLATED A1C: CPT

## 2024-11-06 PROCEDURE — 93306 TTE W/DOPPLER COMPLETE: CPT

## 2024-11-06 PROCEDURE — 93922 UPR/L XTREMITY ART 2 LEVELS: CPT

## 2024-11-06 PROCEDURE — 94729 DIFFUSING CAPACITY: CPT

## 2024-11-06 PROCEDURE — 71046 X-RAY EXAM CHEST 2 VIEWS: CPT

## 2024-11-06 PROCEDURE — 85610 PROTHROMBIN TIME: CPT

## 2024-11-06 PROCEDURE — 83880 ASSAY OF NATRIURETIC PEPTIDE: CPT

## 2024-11-06 PROCEDURE — 93880 EXTRACRANIAL BILAT STUDY: CPT

## 2024-11-06 PROCEDURE — 94010 BREATHING CAPACITY TEST: CPT

## 2024-11-06 PROCEDURE — 81001 URINALYSIS AUTO W/SCOPE: CPT

## 2024-11-06 PROCEDURE — 36600 WITHDRAWAL OF ARTERIAL BLOOD: CPT

## 2024-11-06 PROCEDURE — 83735 ASSAY OF MAGNESIUM: CPT

## 2024-11-06 PROCEDURE — 94726 PLETHYSMOGRAPHY LUNG VOLUMES: CPT

## 2024-11-06 PROCEDURE — 86850 RBC ANTIBODY SCREEN: CPT

## 2024-11-06 PROCEDURE — 93970 EXTREMITY STUDY: CPT

## 2024-11-06 PROCEDURE — 86900 BLOOD TYPING SEROLOGIC ABO: CPT

## 2024-11-06 PROCEDURE — 36415 COLL VENOUS BLD VENIPUNCTURE: CPT

## 2024-11-06 PROCEDURE — 85730 THROMBOPLASTIN TIME PARTIAL: CPT

## 2024-11-06 PROCEDURE — 84443 ASSAY THYROID STIM HORMONE: CPT

## 2024-11-06 PROCEDURE — 80053 COMPREHEN METABOLIC PANEL: CPT

## 2024-11-06 RX ORDER — IBUPROFEN 200 MG
200 TABLET ORAL EVERY 6 HOURS PRN
COMMUNITY

## 2024-11-06 RX ORDER — MUPIROCIN 20 MG/G
OINTMENT TOPICAL 2 TIMES DAILY
Qty: 1 EACH | Refills: 0 | Status: ON HOLD | OUTPATIENT
Start: 2024-11-10 | End: 2024-11-15 | Stop reason: HOSPADM

## 2024-11-06 RX ORDER — ACETAMINOPHEN 160 MG
2000 TABLET,DISINTEGRATING ORAL DAILY
COMMUNITY

## 2024-11-06 RX ORDER — SODIUM CHLORIDE 9 MG/ML
INJECTION, SOLUTION INTRAVENOUS PRN
Status: DISCONTINUED | OUTPATIENT
Start: 2024-11-06 | End: 2024-11-10 | Stop reason: HOSPADM

## 2024-11-06 RX ORDER — CHLORHEXIDINE GLUCONATE ORAL RINSE 1.2 MG/ML
15 SOLUTION DENTAL 2 TIMES DAILY
Qty: 420 ML | Refills: 0 | Status: ON HOLD | OUTPATIENT
Start: 2024-11-10 | End: 2024-11-15 | Stop reason: HOSPADM

## 2024-11-06 RX ORDER — ACETAMINOPHEN 325 MG/1
650 TABLET ORAL EVERY 6 HOURS PRN
COMMUNITY

## 2024-11-06 RX ORDER — AMIODARONE HYDROCHLORIDE 200 MG/1
400 TABLET ORAL 2 TIMES DAILY
Qty: 20 TABLET | Refills: 0 | Status: ON HOLD | OUTPATIENT
Start: 2024-11-07 | End: 2024-11-15

## 2024-11-06 NOTE — PERIOP NOTE
[address] [address]   MAIN OR                                  [phone]    MAIN PRE OP             [phone]                                                                                AMBULATORY PRE OP          [phone]  PRE-ADMISSION TESTING    [phone]     Surgery Date:  11/12/24       Is surgery arrival time given by surgeon?  YES  If “NO”, [facility] staff will call you between 4 and 7pm the day before your surgery with your arrival time. (If your surgery is on a Monday, we will call you the Friday before.)    Call [phone] after 7pm Monday-Friday if you did not receive this call.    INSTRUCTIONS BEFORE YOUR SURGERY   When You  Arrive Arrive at [facility] Patient Access on 1st floor the day of your surgery.  Have your insurance card, photo ID,living will/advanced directive/POA (if applicable),  and any copayment (if needed)   Food   and   Drink NO solid food after midnight the night before surgery. You can drink clear liquids from midnight until ONE hour prior to your arrival at the hospital on the day of your surgery. Clear liquids include:  Water  Fruit juices without pulp  Carbonated beverages  Black coffee(no cream/milk)  Tea(no cream/milk)  Gatorade    No alcohol (beer, wine, liquor) or marijuana (smoking) 24 hours, edibles (3 days). Stop smoking cigarettes 14 days before surgery (helps w/healing and breathing).   Bathing Clothing  Jewelry  Valuables     When you shower the morning of surgery, please do not apply anything to your skin (lotions, powders, deodorant (if having breast surgery), or makeup, especially mascara). Remove fingernail polish except for clear.  Follow Chlorhexidine body wash instructions provided to you during PAT appointment. The night before and morning of surgery.  Do not shave or trim anywhere 24 hours before surgery  Wear your hair loose or down; no pony-tails, buns, or metal hair

## 2024-11-06 NOTE — H&P
patient. Walter's test result texted to Dr. Matson.   10. Mupirocin, Peridex and amiodarone ordered.       Time spent: 55 minutes      Signed By: LISA Smith - NP     November 6, 2024

## 2024-11-06 NOTE — PERIOP NOTE
Pre-Operative Nutrition Instructions:    Following this nutritional protocol will help optimize your nutritional status prior to surgery. This will decrease the risk of post-operative complications and promote wound healing, which is vital for a speedy recovery.    You will need to purchase:  Ensure HIGH PROTEIN 8oz Nutritional Shakes - at least 20, as you will need some after surgery.  Gatorade (not G2) - TWO 28 oz bottles    Beginning 5 days prior to your surgery, you will drink 1 shake TWICE daily.     NON-DIABETIC PATIENTS    Use this schedule to keep track of your Ensure Shakes:    Days Before Surgery 1st Shake 2nd Shake   5 [] []   4 [] []   3 [] []   2 [] []   1 [] []     []  The night before surgery, you will drink one 28 oz bottle of Gatorade over 20 minutes or less.       []  The morning of surgery, drink half of the other bottle of Gatorade, or 14 oz, over 10 minutes. This should be completed 1 hour before arrival time to the hospital.

## 2024-11-06 NOTE — H&P (VIEW-ONLY)
Cardiac Surgery   History and Physical  Referring cardiologist: Dr. Adan  Primary cardiologist: Dr. Phillips  PCP: Dr. Montez  CC: positive nuclear stress test.  Asymptomatic.   Diagnosis: severe coronary artery disease  Consulting surgeon:   Subjective:      Pedro Montes De Oca is a 64 y.o. man with a history of prior PCI/JUVENTINO to RCA in 2017, STEMI 2017, CKD?, HLD, HTN, TIA with CPAP, bradycardia on metoprolol  who was referred for cardiac surgery evaluation.  He denies any chest pains or dyspnea.  He had a follow up nuclear stress test due to the MI and stent in 2017.  It was indicative of ischemia. Subsequent cardiac catheterization showed significant coronary artery disease.     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  He is a former smoker of cigars (quit regular use about 25 years ago). He drinks about one or less  alcoholic drink a week.  He walks 8000 steps a day.     Cardiac Testing    Cardiac catheterization:  Conclusion         Severe left main disease by IVUS with MLA 6mm2    Diffuse disease of lad with MLA 4mm2 by ivus without critical stenosis    Critical stenosis of OM1    Moderate isr of RCA stents    Normal lvedp     Findings:   L Main:large caliber vessel, ivus with heavy plaque burden with MLA ~ 6mm2  LAD:large caliber, diffuse moderate to severe disease with ivus proximal lad demonstrating  mla ~ 4mms, moderate diagonal with severe diffuse disease  LCx: large caliber, diffuse moderate to severe disease of proximal through av groove into small distal Lcx, moderate om1 with severe proximal 90%  RCA: large caliber, diffuse moderate ISR of proximal RCA disease, diffuse mild to moderate distal rca disease, moderate pda and moderate pl branch with diffuse moderate disease     LVEDP:  < 10 mmhg        Plan:  CABG evaluation  Repeat lipids, apo B 100, lp(a)  Add zetia 10mg/d, pending above testing may benefit from pcsk9    ECHO: pending    Past Medical History:   Diagnosis Date    Coronary  following:    Height as of 11/6/24: 1.778 m (5' 10\").    Weight as of 11/6/24: 95.7 kg (210 lb 15.7 oz).     If the answer to any of the following is Yes, then recommend prescribe Oral Nutrition Supplements (ONS) for at least 7 days prior to surgery and/or order referral to dietitian for further assessment and nutrition therapy.    1. Does the patient have a documented serum albumin less than 3.0 within the last 90 days?   No = 0       2. Is patient's BMI less than 18.5 (or less than 20 if age over 65)?  No = 0       3. Has the patient had an unplanned weight loss of 10% of body weight or more in the last 6 months? No = 0   4. Has the patient been eating less than 50% of their normal diet in the preceding week? No = 0   BECKI Score (number of Yes responses), 0-4 0     Plan:       Electronically signed by LISA Smith NP on 10/22/24 at 11:54 AM EDT    Atrial Fibrillation CHADSVASC2 Score Stroke Risk:   64 y.o. <65 - 0    male Male - 0   CHF HX: No - 0   HTN HX: Yes - 1   Stroke/TIA/Thromboembolism No - 0   Vascular Disease HX: No - 0   Diabetes Mellitus No - 0   CHADSVASC 2 Score 1      Annual Stroke Risk 0.6% - low-moderate risk           HAS-BLED Score                            Risk Factors      Points   Hypertension  Uncontrolled, >160 mmHg systolic   Yes=1   Renal disease  Dialysis, transplant, Cr>2.26 mg/dL or >200 µmol/L   Yes=1   Liver disease   Cirrhosis or bilirubin >2x normal with AST/ALT/AP >3x normal   No=0   Stroke history   No=0   Prior major bleeding or predisposition to bleeding     Yes=1   Labile  INR  Unstable/high INRs, time in therapeutic range <60%   No=0   Age >65   No=0   Medication usage predisposing to bleeding   Aspirin, clopidogrel, NSAIDs   Yes=1   Alcohol use   >7 drinks/week   No=0     HAS-BLED Score:    4:  Risk was 8.9% in one validation study and 8.70 bleeds per 100 patient-years in another validation study.     Has-BLED score: 1 low risk, 2 moderate risk, 3 or greater high  patient. Walter's test result texted to Dr. Matson.   10. Mupirocin, Peridex and amiodarone ordered.       Time spent: 55 minutes      Signed By: LISA Smith - NP     November 6, 2024

## 2024-11-07 LAB
ECHO BSA: 2.17 M2
VAS LEFT ABI: 1.23
VAS LEFT ABI: 1.23
VAS LEFT ARM BP: 171 MMHG
VAS LEFT ARM BP: 171 MMHG
VAS LEFT CCA DIST EDV: 22.3 CM/S
VAS LEFT CCA DIST PSV: 104.7 CM/S
VAS LEFT CCA PROX EDV: 17.9 CM/S
VAS LEFT CCA PROX PSV: 109.1 CM/S
VAS LEFT DORSALIS PEDIS BP: 215 MMHG
VAS LEFT DORSALIS PEDIS BP: 215 MMHG
VAS LEFT ECA EDV: 6.9 CM/S
VAS LEFT ECA PSV: 105.8 CM/S
VAS LEFT GSV ANKLE DIAM: 2.2 MM
VAS LEFT GSV AT KNEE DIAM: 3.1 MM
VAS LEFT GSV BK PROX DIAM: 2.5 MM
VAS LEFT GSV THIGH DIST DIAM: 2.8 MM
VAS LEFT GSV THIGH MID DIAM: 3.5 MM
VAS LEFT GSV THIGH PROX DIAM: 4.2 MM
VAS LEFT ICA DIST EDV: 23.4 CM/S
VAS LEFT ICA DIST PSV: 72.8 CM/S
VAS LEFT ICA MID EDV: 24.5 CM/S
VAS LEFT ICA MID PSV: 76.1 CM/S
VAS LEFT ICA PROX EDV: 25.6 CM/S
VAS LEFT ICA PROX PSV: 82.7 CM/S
VAS LEFT ICA/CCA PSV: 0.8 NO UNITS
VAS LEFT PTA BP: 217 MMHG
VAS LEFT PTA BP: 217 MMHG
VAS LEFT SSV DIST DIAM: 2.1 MM
VAS LEFT SSV PROX DIAM: 2.1 MM
VAS LEFT VERTEBRAL EDV: 11.6 CM/S
VAS LEFT VERTEBRAL PSV: 49.4 CM/S
VAS RIGHT ABI: 1.3
VAS RIGHT ABI: 1.3
VAS RIGHT ARM BP: 176 MMHG
VAS RIGHT ARM BP: 176 MMHG
VAS RIGHT CCA DIST EDV: 15.7 CM/S
VAS RIGHT CCA DIST PSV: 76.2 CM/S
VAS RIGHT CCA PROX EDV: 16 CM/S
VAS RIGHT CCA PROX PSV: 96.9 CM/S
VAS RIGHT DORSALIS PEDIS BP: 199 MMHG
VAS RIGHT DORSALIS PEDIS BP: 199 MMHG
VAS RIGHT ECA EDV: 7.7 CM/S
VAS RIGHT ECA PSV: 115.8 CM/S
VAS RIGHT GSV ANKLE DIAM: 2.7 MM
VAS RIGHT GSV AT KNEE DIAM: 2 MM
VAS RIGHT GSV BK PROX DIAM: 2.4 MM
VAS RIGHT GSV THIGH DIST DIAM: 2.3 MM
VAS RIGHT GSV THIGH MID DIAM: 2.5 MM
VAS RIGHT GSV THIGH PROX DIAM: 3.2 MM
VAS RIGHT ICA DIST EDV: 31.1 CM/S
VAS RIGHT ICA DIST PSV: 81.7 CM/S
VAS RIGHT ICA MID EDV: 30 CM/S
VAS RIGHT ICA MID PSV: 80.6 CM/S
VAS RIGHT ICA PROX EDV: 20.1 CM/S
VAS RIGHT ICA PROX PSV: 65.2 CM/S
VAS RIGHT ICA/CCA PSV: 1.1 NO UNITS
VAS RIGHT PTA BP: 228 MMHG
VAS RIGHT PTA BP: 228 MMHG
VAS RIGHT SSV DIST DIAM: 2.6 MM
VAS RIGHT SSV PROX DIAM: 2.4 MM
VAS RIGHT VERTEBRAL EDV: 16.3 CM/S
VAS RIGHT VERTEBRAL PSV: 62.6 CM/S

## 2024-11-11 ENCOUNTER — TELEPHONE (OUTPATIENT)
Age: 64
End: 2024-11-11

## 2024-11-11 ENCOUNTER — ANESTHESIA EVENT (OUTPATIENT)
Facility: HOSPITAL | Age: 64
End: 2024-11-11
Payer: COMMERCIAL

## 2024-11-11 RX ORDER — SODIUM CHLORIDE 0.9 % (FLUSH) 0.9 %
5-40 SYRINGE (ML) INJECTION EVERY 12 HOURS SCHEDULED
Status: CANCELLED | OUTPATIENT
Start: 2024-11-11

## 2024-11-11 RX ORDER — PROCHLORPERAZINE EDISYLATE 5 MG/ML
5 INJECTION INTRAMUSCULAR; INTRAVENOUS
Status: CANCELLED | OUTPATIENT
Start: 2024-11-11 | End: 2024-11-12

## 2024-11-11 RX ORDER — OXYCODONE HYDROCHLORIDE 5 MG/1
5 TABLET ORAL
Status: CANCELLED | OUTPATIENT
Start: 2024-11-11 | End: 2024-11-12

## 2024-11-11 RX ORDER — SODIUM CHLORIDE 0.9 % (FLUSH) 0.9 %
5-40 SYRINGE (ML) INJECTION PRN
Status: CANCELLED | OUTPATIENT
Start: 2024-11-11

## 2024-11-11 RX ORDER — HYDRALAZINE HYDROCHLORIDE 20 MG/ML
10 INJECTION INTRAMUSCULAR; INTRAVENOUS ONCE
Status: CANCELLED | OUTPATIENT
Start: 2024-11-11 | End: 2024-11-11

## 2024-11-11 RX ORDER — NALOXONE HYDROCHLORIDE 0.4 MG/ML
INJECTION, SOLUTION INTRAMUSCULAR; INTRAVENOUS; SUBCUTANEOUS PRN
Status: CANCELLED | OUTPATIENT
Start: 2024-11-11

## 2024-11-11 RX ORDER — HYDROMORPHONE HYDROCHLORIDE 1 MG/ML
0.5 INJECTION, SOLUTION INTRAMUSCULAR; INTRAVENOUS; SUBCUTANEOUS EVERY 5 MIN PRN
Status: CANCELLED | OUTPATIENT
Start: 2024-11-11

## 2024-11-11 RX ORDER — ONDANSETRON 2 MG/ML
4 INJECTION INTRAMUSCULAR; INTRAVENOUS
Status: CANCELLED | OUTPATIENT
Start: 2024-11-11 | End: 2024-11-12

## 2024-11-11 RX ORDER — SODIUM CHLORIDE 9 MG/ML
INJECTION, SOLUTION INTRAVENOUS PRN
Status: CANCELLED | OUTPATIENT
Start: 2024-11-11

## 2024-11-11 RX ORDER — FENTANYL CITRATE 50 UG/ML
25 INJECTION, SOLUTION INTRAMUSCULAR; INTRAVENOUS EVERY 5 MIN PRN
Status: CANCELLED | OUTPATIENT
Start: 2024-11-11

## 2024-11-11 NOTE — TELEPHONE ENCOUNTER
Cardiac Surgery Care Coordinator-  Called Pedro Montes De Oca to review plan of care and day of surgery expectations.  Encouraged Pedro Montes De Oca to verbalize and offered emotional support.  Pedro Montes De Oca is without questions or concerns at this time.

## 2024-11-12 ENCOUNTER — ANESTHESIA (OUTPATIENT)
Facility: HOSPITAL | Age: 64
End: 2024-11-12
Payer: COMMERCIAL

## 2024-11-12 ENCOUNTER — HOSPITAL ENCOUNTER (OUTPATIENT)
Facility: HOSPITAL | Age: 64
Discharge: HOME | End: 2024-11-14
Attending: THORACIC SURGERY (CARDIOTHORACIC VASCULAR SURGERY)

## 2024-11-12 ENCOUNTER — APPOINTMENT (OUTPATIENT)
Facility: HOSPITAL | Age: 64
End: 2024-11-12
Attending: THORACIC SURGERY (CARDIOTHORACIC VASCULAR SURGERY)
Payer: COMMERCIAL

## 2024-11-12 ENCOUNTER — HOSPITAL ENCOUNTER (INPATIENT)
Facility: HOSPITAL | Age: 64
LOS: 3 days | Discharge: HOME | End: 2024-11-15
Attending: THORACIC SURGERY (CARDIOTHORACIC VASCULAR SURGERY) | Admitting: THORACIC SURGERY (CARDIOTHORACIC VASCULAR SURGERY)
Payer: COMMERCIAL

## 2024-11-12 DIAGNOSIS — E11.65 TYPE 2 DIABETES MELLITUS WITH HYPERGLYCEMIA, WITHOUT LONG-TERM CURRENT USE OF INSULIN (HCC): ICD-10-CM

## 2024-11-12 DIAGNOSIS — Z95.1 POSTSURGICAL AORTOCORONARY BYPASS STATUS: Primary | ICD-10-CM

## 2024-11-12 DIAGNOSIS — Z95.1 S/P CABG X 3: ICD-10-CM

## 2024-11-12 DIAGNOSIS — E11.65 TYPE 2 DIABETES MELLITUS WITH HYPERGLYCEMIA (HCC): ICD-10-CM

## 2024-11-12 DIAGNOSIS — I25.10 CAD IN NATIVE ARTERY: Primary | ICD-10-CM

## 2024-11-12 LAB
ABO + RH BLD: NORMAL
ACUTE KIDNEY INJURY RISK NEPHROCHECK: 0.15 (ref 0–0.3)
ALBUMIN SERPL-MCNC: 3.3 G/DL (ref 3.5–5)
ALBUMIN SERPL-MCNC: 4 G/DL (ref 3.5–5)
ALBUMIN/GLOB SERPL: 1.5 (ref 1.1–2.2)
ALBUMIN/GLOB SERPL: 1.7 (ref 1.1–2.2)
ALP SERPL-CCNC: 103 U/L (ref 45–117)
ALP SERPL-CCNC: 93 U/L (ref 45–117)
ALT SERPL-CCNC: 33 U/L (ref 12–78)
ALT SERPL-CCNC: 33 U/L (ref 12–78)
ANION GAP BLD CALC-SCNC: 10 (ref 10–20)
ANION GAP BLD CALC-SCNC: 12 (ref 10–20)
ANION GAP BLD CALC-SCNC: 12 (ref 10–20)
ANION GAP BLD CALC-SCNC: 8 (ref 10–20)
ANION GAP BLD CALC-SCNC: 9 (ref 10–20)
ANION GAP SERPL CALC-SCNC: 1 MMOL/L (ref 2–12)
ANION GAP SERPL CALC-SCNC: 8 MMOL/L (ref 2–12)
APTT PPP: 22.4 SEC (ref 22.1–31)
AST SERPL-CCNC: 26 U/L (ref 15–37)
AST SERPL-CCNC: 29 U/L (ref 15–37)
BASE DEFICIT BLD-SCNC: 1 MMOL/L
BASE DEFICIT BLD-SCNC: 1.3 MMOL/L
BASE DEFICIT BLD-SCNC: 1.4 MMOL/L
BASE DEFICIT BLD-SCNC: 2.1 MMOL/L
BASE DEFICIT BLD-SCNC: 2.8 MMOL/L
BASE DEFICIT BLD-SCNC: 3.2 MMOL/L
BASE DEFICIT BLD-SCNC: 3.3 MMOL/L
BASE DEFICIT BLD-SCNC: 3.4 MMOL/L
BASE DEFICIT BLD-SCNC: 4.5 MMOL/L
BDY SITE: ABNORMAL
BILIRUB SERPL-MCNC: 0.8 MG/DL (ref 0.2–1)
BILIRUB SERPL-MCNC: 0.9 MG/DL (ref 0.2–1)
BLD PROD TYP BPU: NORMAL
BLD PROD TYP BPU: NORMAL
BLOOD BANK DISPENSE STATUS: NORMAL
BLOOD BANK DISPENSE STATUS: NORMAL
BLOOD GROUP ANTIBODIES SERPL: NORMAL
BPU ID: NORMAL
BPU ID: NORMAL
BUN SERPL-MCNC: 16 MG/DL (ref 6–20)
BUN SERPL-MCNC: 17 MG/DL (ref 6–20)
BUN/CREAT SERPL: 11 (ref 12–20)
BUN/CREAT SERPL: 11 (ref 12–20)
CA-I BLD-MCNC: 1.02 MMOL/L (ref 1.15–1.33)
CA-I BLD-MCNC: 1.07 MMOL/L (ref 1.15–1.33)
CA-I BLD-MCNC: 1.1 MMOL/L (ref 1.15–1.33)
CA-I BLD-MCNC: 1.17 MMOL/L (ref 1.15–1.33)
CA-I BLD-MCNC: 1.17 MMOL/L (ref 1.15–1.33)
CA-I BLD-MCNC: 1.19 MMOL/L (ref 1.15–1.33)
CA-I BLD-MCNC: 1.19 MMOL/L (ref 1.15–1.33)
CA-I BLD-SCNC: 1.19 MMOL/L (ref 1.12–1.32)
CA-I BLD-SCNC: 1.23 MMOL/L (ref 1.12–1.32)
CALCIUM SERPL-MCNC: 7.4 MG/DL (ref 8.5–10.1)
CALCIUM SERPL-MCNC: 8 MG/DL (ref 8.5–10.1)
CHLORIDE BLD-SCNC: 107 MMOL/L (ref 100–111)
CHLORIDE BLD-SCNC: 108 MMOL/L (ref 100–111)
CHLORIDE BLD-SCNC: 109 MMOL/L (ref 100–111)
CHLORIDE BLD-SCNC: 110 MMOL/L (ref 100–111)
CHLORIDE SERPL-SCNC: 115 MMOL/L (ref 97–108)
CHLORIDE SERPL-SCNC: 116 MMOL/L (ref 97–108)
CO2 BLD-SCNC: 23 MMOL/L (ref 22–29)
CO2 BLD-SCNC: 23 MMOL/L (ref 22–29)
CO2 BLD-SCNC: 24 MMOL/L (ref 22–29)
CO2 BLD-SCNC: 25 MMOL/L (ref 22–29)
CO2 SERPL-SCNC: 22 MMOL/L (ref 21–32)
CO2 SERPL-SCNC: 26 MMOL/L (ref 21–32)
CREAT SERPL-MCNC: 1.46 MG/DL (ref 0.7–1.3)
CREAT SERPL-MCNC: 1.48 MG/DL (ref 0.7–1.3)
CREAT UR-MCNC: 1.2 MG/DL (ref 0.6–1.3)
CREAT UR-MCNC: 1.3 MG/DL (ref 0.6–1.3)
CROSSMATCH RESULT: NORMAL
CROSSMATCH RESULT: NORMAL
ECHO BSA: 2.17 M2
ERYTHROCYTE [DISTWIDTH] IN BLOOD BY AUTOMATED COUNT: 13.1 % (ref 11.5–14.5)
ERYTHROCYTE [DISTWIDTH] IN BLOOD BY AUTOMATED COUNT: 13.2 % (ref 11.5–14.5)
GLOBULIN SER CALC-MCNC: 2.2 G/DL (ref 2–4)
GLOBULIN SER CALC-MCNC: 2.3 G/DL (ref 2–4)
GLUCOSE BLD STRIP.AUTO-MCNC: 116 MG/DL (ref 65–117)
GLUCOSE BLD STRIP.AUTO-MCNC: 123 MG/DL (ref 65–117)
GLUCOSE BLD STRIP.AUTO-MCNC: 129 MG/DL (ref 65–117)
GLUCOSE BLD STRIP.AUTO-MCNC: 135 MG/DL (ref 65–117)
GLUCOSE BLD STRIP.AUTO-MCNC: 136 MG/DL (ref 65–117)
GLUCOSE BLD STRIP.AUTO-MCNC: 136 MG/DL (ref 74–99)
GLUCOSE BLD STRIP.AUTO-MCNC: 141 MG/DL (ref 65–117)
GLUCOSE BLD STRIP.AUTO-MCNC: 153 MG/DL (ref 65–117)
GLUCOSE BLD STRIP.AUTO-MCNC: 156 MG/DL (ref 74–99)
GLUCOSE BLD STRIP.AUTO-MCNC: 159 MG/DL (ref 65–117)
GLUCOSE BLD STRIP.AUTO-MCNC: 163 MG/DL (ref 74–99)
GLUCOSE BLD STRIP.AUTO-MCNC: 167 MG/DL (ref 74–99)
GLUCOSE BLD STRIP.AUTO-MCNC: 175 MG/DL (ref 74–99)
GLUCOSE BLD STRIP.AUTO-MCNC: 179 MG/DL (ref 74–99)
GLUCOSE BLD STRIP.AUTO-MCNC: 185 MG/DL (ref 65–117)
GLUCOSE BLD STRIP.AUTO-MCNC: 198 MG/DL (ref 65–117)
GLUCOSE BLD STRIP.AUTO-MCNC: 201 MG/DL (ref 74–99)
GLUCOSE SERPL-MCNC: 130 MG/DL (ref 65–100)
GLUCOSE SERPL-MCNC: 151 MG/DL (ref 65–100)
HCO3 BLD-SCNC: 22.9 MMOL/L (ref 21–28)
HCO3 BLD-SCNC: 23.5 MMOL/L (ref 21–28)
HCO3 BLDA-SCNC: 23 MMOL/L
HCO3 BLDA-SCNC: 23 MMOL/L
HCO3 BLDA-SCNC: 24 MMOL/L
HCT VFR BLD AUTO: 30 % (ref 36.6–50.3)
HCT VFR BLD AUTO: 32.7 % (ref 36.6–50.3)
HGB BLD-MCNC: 10.2 G/DL (ref 12.1–17)
HGB BLD-MCNC: 11.1 G/DL (ref 12.1–17)
HISTORY CHECK: NORMAL
INR PPP: 1.2 (ref 0.9–1.1)
LACTATE BLD-SCNC: 0.46 MMOL/L (ref 0.4–2)
LACTATE BLD-SCNC: 1.03 MMOL/L (ref 0.4–2)
LACTATE BLD-SCNC: 1.04 MMOL/L (ref 0.4–2)
LACTATE BLD-SCNC: 1.23 MMOL/L (ref 0.4–2)
LACTATE BLD-SCNC: 1.43 MMOL/L (ref 0.4–2)
LACTATE BLD-SCNC: 1.91 MMOL/L (ref 0.4–2)
LACTATE BLD-SCNC: 2.75 MMOL/L (ref 0.4–2)
MAGNESIUM SERPL-MCNC: 2.6 MG/DL (ref 1.6–2.4)
MCH RBC QN AUTO: 30.7 PG (ref 26–34)
MCH RBC QN AUTO: 30.8 PG (ref 26–34)
MCHC RBC AUTO-ENTMCNC: 33.9 G/DL (ref 30–36.5)
MCHC RBC AUTO-ENTMCNC: 34 G/DL (ref 30–36.5)
MCV RBC AUTO: 90.6 FL (ref 80–99)
MCV RBC AUTO: 90.6 FL (ref 80–99)
NRBC # BLD: 0 K/UL (ref 0–0.01)
NRBC # BLD: 0 K/UL (ref 0–0.01)
NRBC BLD-RTO: 0 PER 100 WBC
NRBC BLD-RTO: 0 PER 100 WBC
PCO2 BLD: 36.3 MMHG (ref 35–48)
PCO2 BLD: 39.6 MMHG (ref 35–48)
PCO2 BLD: 40.6 MMHG (ref 35–48)
PCO2 BLD: 45.4 MMHG (ref 35–48)
PCO2 BLD: 48.2 MMHG (ref 35–48)
PCO2 BLD: 48.6 MMHG (ref 35–48)
PCO2 BLD: 49.7 MMHG (ref 35–48)
PCO2 BLD: 51.8 MMHG (ref 35–48)
PCO2 BLD: 53.4 MMHG (ref 35–48)
PH BLD: 7.25 (ref 7.35–7.45)
PH BLD: 7.26 (ref 7.35–7.45)
PH BLD: 7.28 (ref 7.35–7.45)
PH BLD: 7.3 (ref 7.35–7.45)
PH BLD: 7.31 (ref 7.35–7.45)
PH BLD: 7.31 (ref 7.35–7.45)
PH BLD: 7.38 (ref 7.35–7.45)
PH BLD: 7.38 (ref 7.35–7.45)
PH BLD: 7.41 (ref 7.35–7.45)
PLATELET # BLD AUTO: 168 K/UL (ref 150–400)
PLATELET # BLD AUTO: 168 K/UL (ref 150–400)
PMV BLD AUTO: 9.3 FL (ref 8.9–12.9)
PMV BLD AUTO: 9.7 FL (ref 8.9–12.9)
PO2 BLD: 108 MMHG (ref 83–108)
PO2 BLD: 174 MMHG (ref 83–108)
PO2 BLD: 177 MMHG (ref 83–108)
PO2 BLD: 225 MMHG (ref 83–108)
PO2 BLD: 252 MMHG (ref 83–108)
PO2 BLD: 280 MMHG (ref 83–108)
PO2 BLD: 296 MMHG (ref 83–108)
PO2 BLD: 410 MMHG (ref 83–108)
PO2 BLD: >515 MMHG (ref 83–108)
POTASSIUM BLD-SCNC: 4.2 MMOL/L (ref 3.5–5.5)
POTASSIUM BLD-SCNC: 4.5 MMOL/L (ref 3.5–5.5)
POTASSIUM BLD-SCNC: 4.8 MMOL/L (ref 3.5–5.5)
POTASSIUM BLD-SCNC: 5 MMOL/L (ref 3.5–5.5)
POTASSIUM BLD-SCNC: 5.1 MMOL/L (ref 3.5–5.5)
POTASSIUM BLD-SCNC: 6 MMOL/L (ref 3.5–5.5)
POTASSIUM BLD-SCNC: 6.6 MMOL/L (ref 3.5–5.5)
POTASSIUM SERPL-SCNC: 3.8 MMOL/L (ref 3.5–5.1)
POTASSIUM SERPL-SCNC: 4.5 MMOL/L (ref 3.5–5.1)
PROT SERPL-MCNC: 5.5 G/DL (ref 6.4–8.2)
PROT SERPL-MCNC: 6.3 G/DL (ref 6.4–8.2)
PROTHROMBIN TIME: 12.2 SEC (ref 9–11.1)
RBC # BLD AUTO: 3.31 M/UL (ref 4.1–5.7)
RBC # BLD AUTO: 3.61 M/UL (ref 4.1–5.7)
SAO2 % BLD: 100 % (ref 94–98)
SAO2 % BLD: 97.1 % (ref 92–97)
SAO2 % BLD: 99 % (ref 94–98)
SAO2 % BLD: 99.4 % (ref 92–97)
SERVICE CMNT-IMP: ABNORMAL
SERVICE CMNT-IMP: NORMAL
SODIUM BLD-SCNC: 140 MMOL/L (ref 136–145)
SODIUM BLD-SCNC: 141 MMOL/L (ref 136–145)
SODIUM BLD-SCNC: 142 MMOL/L (ref 136–145)
SODIUM BLD-SCNC: 143 MMOL/L (ref 136–145)
SODIUM BLD-SCNC: 143 MMOL/L (ref 136–145)
SODIUM SERPL-SCNC: 143 MMOL/L (ref 136–145)
SODIUM SERPL-SCNC: 145 MMOL/L (ref 136–145)
SPECIMEN EXP DATE BLD: NORMAL
SPECIMEN SITE: ABNORMAL
SPECIMEN TYPE: ABNORMAL
SPECIMEN TYPE: ABNORMAL
THERAPEUTIC RANGE: NORMAL SECS (ref 58–77)
UNIT DIVISION: 0
UNIT DIVISION: 0
WBC # BLD AUTO: 16 K/UL (ref 4.1–11.1)
WBC # BLD AUTO: 20.2 K/UL (ref 4.1–11.1)

## 2024-11-12 PROCEDURE — 021009W BYPASS CORONARY ARTERY, ONE ARTERY FROM AORTA WITH AUTOLOGOUS VENOUS TISSUE, OPEN APPROACH: ICD-10-PCS | Performed by: PHYSICIAN ASSISTANT

## 2024-11-12 PROCEDURE — 2580000003 HC RX 258: Performed by: STUDENT IN AN ORGANIZED HEALTH CARE EDUCATION/TRAINING PROGRAM

## 2024-11-12 PROCEDURE — C1768 GRAFT, VASCULAR: HCPCS | Performed by: THORACIC SURGERY (CARDIOTHORACIC VASCULAR SURGERY)

## 2024-11-12 PROCEDURE — 2580000003 HC RX 258: Performed by: NURSE ANESTHETIST, CERTIFIED REGISTERED

## 2024-11-12 PROCEDURE — 85018 HEMOGLOBIN: CPT

## 2024-11-12 PROCEDURE — 02100AW BYPASS CORONARY ARTERY, ONE ARTERY FROM AORTA WITH AUTOLOGOUS ARTERIAL TISSUE, OPEN APPROACH: ICD-10-PCS | Performed by: PHYSICIAN ASSISTANT

## 2024-11-12 PROCEDURE — 6360000002 HC RX W HCPCS: Performed by: NURSE PRACTITIONER

## 2024-11-12 PROCEDURE — 6360000002 HC RX W HCPCS: Performed by: ANESTHESIOLOGY

## 2024-11-12 PROCEDURE — 2500000003 HC RX 250 WO HCPCS: Performed by: NURSE ANESTHETIST, CERTIFIED REGISTERED

## 2024-11-12 PROCEDURE — C1713 ANCHOR/SCREW BN/BN,TIS/BN: HCPCS | Performed by: THORACIC SURGERY (CARDIOTHORACIC VASCULAR SURGERY)

## 2024-11-12 PROCEDURE — 36415 COLL VENOUS BLD VENIPUNCTURE: CPT

## 2024-11-12 PROCEDURE — 94760 N-INVAS EAR/PLS OXIMETRY 1: CPT

## 2024-11-12 PROCEDURE — 6370000000 HC RX 637 (ALT 250 FOR IP)

## 2024-11-12 PROCEDURE — 3700000000 HC ANESTHESIA ATTENDED CARE: Performed by: THORACIC SURGERY (CARDIOTHORACIC VASCULAR SURGERY)

## 2024-11-12 PROCEDURE — 2720000010 HC SURG SUPPLY STERILE: Performed by: THORACIC SURGERY (CARDIOTHORACIC VASCULAR SURGERY)

## 2024-11-12 PROCEDURE — P9045 ALBUMIN (HUMAN), 5%, 250 ML: HCPCS | Performed by: ANESTHESIOLOGY

## 2024-11-12 PROCEDURE — 82330 ASSAY OF CALCIUM: CPT

## 2024-11-12 PROCEDURE — 6360000002 HC RX W HCPCS: Performed by: NURSE ANESTHETIST, CERTIFIED REGISTERED

## 2024-11-12 PROCEDURE — 6370000000 HC RX 637 (ALT 250 FOR IP): Performed by: NURSE ANESTHETIST, CERTIFIED REGISTERED

## 2024-11-12 PROCEDURE — 33517 CABG ARTERY-VEIN SINGLE: CPT | Performed by: PHYSICIAN ASSISTANT

## 2024-11-12 PROCEDURE — 3700000001 HC ADD 15 MINUTES (ANESTHESIA): Performed by: THORACIC SURGERY (CARDIOTHORACIC VASCULAR SURGERY)

## 2024-11-12 PROCEDURE — 2500000003 HC RX 250 WO HCPCS

## 2024-11-12 PROCEDURE — 6370000000 HC RX 637 (ALT 250 FOR IP): Performed by: ANESTHESIOLOGY

## 2024-11-12 PROCEDURE — 85610 PROTHROMBIN TIME: CPT

## 2024-11-12 PROCEDURE — 6360000002 HC RX W HCPCS

## 2024-11-12 PROCEDURE — 2580000003 HC RX 258: Performed by: NURSE PRACTITIONER

## 2024-11-12 PROCEDURE — 2000000000 HC ICU R&B

## 2024-11-12 PROCEDURE — 0210099 BYPASS CORONARY ARTERY, ONE ARTERY FROM LEFT INTERNAL MAMMARY WITH AUTOLOGOUS VENOUS TISSUE, OPEN APPROACH: ICD-10-PCS | Performed by: PHYSICIAN ASSISTANT

## 2024-11-12 PROCEDURE — 6360000002 HC RX W HCPCS: Performed by: THORACIC SURGERY (CARDIOTHORACIC VASCULAR SURGERY)

## 2024-11-12 PROCEDURE — 33534 CABG ARTERIAL TWO: CPT | Performed by: PHYSICIAN ASSISTANT

## 2024-11-12 PROCEDURE — 33509 NDSC HRV UXTR ART 1 SGM CAB: CPT | Performed by: PHYSICIAN ASSISTANT

## 2024-11-12 PROCEDURE — 3600000018 HC SURGERY OHS ADDTL 15MIN: Performed by: THORACIC SURGERY (CARDIOTHORACIC VASCULAR SURGERY)

## 2024-11-12 PROCEDURE — 2709999900 HC NON-CHARGEABLE SUPPLY: Performed by: THORACIC SURGERY (CARDIOTHORACIC VASCULAR SURGERY)

## 2024-11-12 PROCEDURE — 85027 COMPLETE CBC AUTOMATED: CPT

## 2024-11-12 PROCEDURE — 2010000000 HC RM ICU SURGICAL

## 2024-11-12 PROCEDURE — 85730 THROMBOPLASTIN TIME PARTIAL: CPT

## 2024-11-12 PROCEDURE — B24BZZ4 ULTRASONOGRAPHY OF HEART WITH AORTA, TRANSESOPHAGEAL: ICD-10-PCS | Performed by: PHYSICIAN ASSISTANT

## 2024-11-12 PROCEDURE — 82962 GLUCOSE BLOOD TEST: CPT

## 2024-11-12 PROCEDURE — 83735 ASSAY OF MAGNESIUM: CPT

## 2024-11-12 PROCEDURE — 2580000003 HC RX 258

## 2024-11-12 PROCEDURE — 2700000000 HC OXYGEN THERAPY PER DAY

## 2024-11-12 PROCEDURE — 2580000003 HC RX 258: Performed by: ANESTHESIOLOGY

## 2024-11-12 PROCEDURE — 84295 ASSAY OF SERUM SODIUM: CPT

## 2024-11-12 PROCEDURE — 03BC4ZZ EXCISION OF LEFT RADIAL ARTERY, PERCUTANEOUS ENDOSCOPIC APPROACH: ICD-10-PCS | Performed by: PHYSICIAN ASSISTANT

## 2024-11-12 PROCEDURE — 06BQ4ZZ EXCISION OF LEFT SAPHENOUS VEIN, PERCUTANEOUS ENDOSCOPIC APPROACH: ICD-10-PCS | Performed by: PHYSICIAN ASSISTANT

## 2024-11-12 PROCEDURE — 84132 ASSAY OF SERUM POTASSIUM: CPT

## 2024-11-12 PROCEDURE — P9045 ALBUMIN (HUMAN), 5%, 250 ML: HCPCS

## 2024-11-12 PROCEDURE — 82947 ASSAY GLUCOSE BLOOD QUANT: CPT

## 2024-11-12 PROCEDURE — 80053 COMPREHEN METABOLIC PANEL: CPT

## 2024-11-12 PROCEDURE — C1729 CATH, DRAINAGE: HCPCS | Performed by: THORACIC SURGERY (CARDIOTHORACIC VASCULAR SURGERY)

## 2024-11-12 PROCEDURE — 3600000008 HC SURGERY OHS BASE: Performed by: THORACIC SURGERY (CARDIOTHORACIC VASCULAR SURGERY)

## 2024-11-12 PROCEDURE — 93005 ELECTROCARDIOGRAM TRACING: CPT

## 2024-11-12 PROCEDURE — 5A1221Z PERFORMANCE OF CARDIAC OUTPUT, CONTINUOUS: ICD-10-PCS | Performed by: PHYSICIAN ASSISTANT

## 2024-11-12 PROCEDURE — 71045 X-RAY EXAM CHEST 1 VIEW: CPT

## 2024-11-12 PROCEDURE — 94002 VENT MGMT INPAT INIT DAY: CPT

## 2024-11-12 PROCEDURE — 82803 BLOOD GASES ANY COMBINATION: CPT

## 2024-11-12 PROCEDURE — 33508 ENDOSCOPIC VEIN HARVEST: CPT | Performed by: PHYSICIAN ASSISTANT

## 2024-11-12 RX ORDER — DIPHENHYDRAMINE HCL 25 MG
25 CAPSULE ORAL NIGHTLY PRN
Status: DISCONTINUED | OUTPATIENT
Start: 2024-11-13 | End: 2024-11-15 | Stop reason: HOSPADM

## 2024-11-12 RX ORDER — MUPIROCIN 20 MG/G
OINTMENT TOPICAL 2 TIMES DAILY
Status: DISCONTINUED | OUTPATIENT
Start: 2024-11-12 | End: 2024-11-15 | Stop reason: HOSPADM

## 2024-11-12 RX ORDER — INSULIN GLARGINE 100 [IU]/ML
1-50 INJECTION, SOLUTION SUBCUTANEOUS
Status: ACTIVE | OUTPATIENT
Start: 2024-11-14 | End: 2024-11-15

## 2024-11-12 RX ORDER — SODIUM CHLORIDE 9 MG/ML
INJECTION, SOLUTION INTRAVENOUS
Status: DISCONTINUED | OUTPATIENT
Start: 2024-11-12 | End: 2024-11-12 | Stop reason: SDUPTHER

## 2024-11-12 RX ORDER — SODIUM CHLORIDE, SODIUM LACTATE, POTASSIUM CHLORIDE, AND CALCIUM CHLORIDE .6; .31; .03; .02 G/100ML; G/100ML; G/100ML; G/100ML
500 INJECTION, SOLUTION INTRAVENOUS ONCE
Status: COMPLETED | OUTPATIENT
Start: 2024-11-12 | End: 2024-11-12

## 2024-11-12 RX ORDER — DEXAMETHASONE SODIUM PHOSPHATE 4 MG/ML
INJECTION, SOLUTION INTRA-ARTICULAR; INTRALESIONAL; INTRAMUSCULAR; INTRAVENOUS; SOFT TISSUE
Status: DISCONTINUED | OUTPATIENT
Start: 2024-11-12 | End: 2024-11-12 | Stop reason: SDUPTHER

## 2024-11-12 RX ORDER — POTASSIUM CHLORIDE 29.8 MG/ML
20 INJECTION INTRAVENOUS PRN
Status: DISCONTINUED | OUTPATIENT
Start: 2024-11-12 | End: 2024-11-14

## 2024-11-12 RX ORDER — BISACODYL 10 MG
10 SUPPOSITORY, RECTAL RECTAL DAILY PRN
Status: DISCONTINUED | OUTPATIENT
Start: 2024-11-12 | End: 2024-11-15 | Stop reason: HOSPADM

## 2024-11-12 RX ORDER — SODIUM CHLORIDE, SODIUM LACTATE, POTASSIUM CHLORIDE, CALCIUM CHLORIDE 600; 310; 30; 20 MG/100ML; MG/100ML; MG/100ML; MG/100ML
INJECTION, SOLUTION INTRAVENOUS CONTINUOUS
Status: DISCONTINUED | OUTPATIENT
Start: 2024-11-12 | End: 2024-11-15 | Stop reason: HOSPADM

## 2024-11-12 RX ORDER — SODIUM CHLORIDE 9 MG/ML
INJECTION, SOLUTION INTRAVENOUS PRN
Status: DISCONTINUED | OUTPATIENT
Start: 2024-11-12 | End: 2024-11-12 | Stop reason: HOSPADM

## 2024-11-12 RX ORDER — GABAPENTIN 100 MG/1
200 CAPSULE ORAL 3 TIMES DAILY
Status: DISCONTINUED | OUTPATIENT
Start: 2024-11-12 | End: 2024-11-15 | Stop reason: HOSPADM

## 2024-11-12 RX ORDER — ROPIVACAINE HYDROCHLORIDE 5 MG/ML
INJECTION, SOLUTION EPIDURAL; INFILTRATION; PERINEURAL PRN
Status: DISCONTINUED | OUTPATIENT
Start: 2024-11-12 | End: 2024-11-12 | Stop reason: ALTCHOICE

## 2024-11-12 RX ORDER — LIDOCAINE HYDROCHLORIDE 10 MG/ML
1 INJECTION, SOLUTION EPIDURAL; INFILTRATION; INTRACAUDAL; PERINEURAL
Status: DISCONTINUED | OUTPATIENT
Start: 2024-11-12 | End: 2024-11-12 | Stop reason: HOSPADM

## 2024-11-12 RX ORDER — ONDANSETRON 2 MG/ML
INJECTION INTRAMUSCULAR; INTRAVENOUS
Status: DISCONTINUED | OUTPATIENT
Start: 2024-11-12 | End: 2024-11-12 | Stop reason: SDUPTHER

## 2024-11-12 RX ORDER — SODIUM CHLORIDE 9 MG/ML
INJECTION, SOLUTION INTRAVENOUS PRN
Status: DISCONTINUED | OUTPATIENT
Start: 2024-11-12 | End: 2024-11-15 | Stop reason: HOSPADM

## 2024-11-12 RX ORDER — HYDROMORPHONE HYDROCHLORIDE 1 MG/ML
0.5 INJECTION, SOLUTION INTRAMUSCULAR; INTRAVENOUS; SUBCUTANEOUS
Status: DISCONTINUED | OUTPATIENT
Start: 2024-11-12 | End: 2024-11-14

## 2024-11-12 RX ORDER — SODIUM CHLORIDE 0.9 % (FLUSH) 0.9 %
5-40 SYRINGE (ML) INJECTION EVERY 12 HOURS SCHEDULED
Status: DISCONTINUED | OUTPATIENT
Start: 2024-11-12 | End: 2024-11-15 | Stop reason: HOSPADM

## 2024-11-12 RX ORDER — VANCOMYCIN HYDROCHLORIDE 1 G/20ML
INJECTION, POWDER, LYOPHILIZED, FOR SOLUTION INTRAVENOUS PRN
Status: DISCONTINUED | OUTPATIENT
Start: 2024-11-12 | End: 2024-11-12 | Stop reason: ALTCHOICE

## 2024-11-12 RX ORDER — LANOLIN ALCOHOL/MO/W.PET/CERES
400 CREAM (GRAM) TOPICAL 2 TIMES DAILY
Status: DISCONTINUED | OUTPATIENT
Start: 2024-11-13 | End: 2024-11-15 | Stop reason: HOSPADM

## 2024-11-12 RX ORDER — LIDOCAINE HYDROCHLORIDE 20 MG/ML
INJECTION, SOLUTION EPIDURAL; INFILTRATION; INTRACAUDAL; PERINEURAL
Status: DISCONTINUED | OUTPATIENT
Start: 2024-11-12 | End: 2024-11-12 | Stop reason: SDUPTHER

## 2024-11-12 RX ORDER — ATORVASTATIN CALCIUM 40 MG/1
80 TABLET, FILM COATED ORAL NIGHTLY
Status: DISCONTINUED | OUTPATIENT
Start: 2024-11-12 | End: 2024-11-15 | Stop reason: HOSPADM

## 2024-11-12 RX ORDER — ROCURONIUM BROMIDE 10 MG/ML
INJECTION, SOLUTION INTRAVENOUS
Status: DISCONTINUED | OUTPATIENT
Start: 2024-11-12 | End: 2024-11-12 | Stop reason: SDUPTHER

## 2024-11-12 RX ORDER — MIDAZOLAM HYDROCHLORIDE 2 MG/2ML
2 INJECTION, SOLUTION INTRAMUSCULAR; INTRAVENOUS PRN
Status: DISCONTINUED | OUTPATIENT
Start: 2024-11-12 | End: 2024-11-12 | Stop reason: HOSPADM

## 2024-11-12 RX ORDER — LIDOCAINE 4 G/G
2 PATCH TOPICAL DAILY
Status: DISCONTINUED | OUTPATIENT
Start: 2024-11-12 | End: 2024-11-15 | Stop reason: HOSPADM

## 2024-11-12 RX ORDER — DEXTROSE MONOHYDRATE 100 MG/ML
INJECTION, SOLUTION INTRAVENOUS CONTINUOUS PRN
Status: DISCONTINUED | OUTPATIENT
Start: 2024-11-12 | End: 2024-11-15 | Stop reason: HOSPADM

## 2024-11-12 RX ORDER — ACETAMINOPHEN 500 MG
1000 TABLET ORAL EVERY 6 HOURS
Status: DISCONTINUED | OUTPATIENT
Start: 2024-11-12 | End: 2024-11-15 | Stop reason: HOSPADM

## 2024-11-12 RX ORDER — HYDRALAZINE HYDROCHLORIDE 20 MG/ML
10 INJECTION INTRAMUSCULAR; INTRAVENOUS EVERY 6 HOURS PRN
Status: DISCONTINUED | OUTPATIENT
Start: 2024-11-12 | End: 2024-11-15 | Stop reason: HOSPADM

## 2024-11-12 RX ORDER — SODIUM CHLORIDE 450 MG/100ML
INJECTION, SOLUTION INTRAVENOUS CONTINUOUS
Status: DISCONTINUED | OUTPATIENT
Start: 2024-11-12 | End: 2024-11-12

## 2024-11-12 RX ORDER — ONDANSETRON 2 MG/ML
4 INJECTION INTRAMUSCULAR; INTRAVENOUS EVERY 4 HOURS PRN
Status: DISCONTINUED | OUTPATIENT
Start: 2024-11-12 | End: 2024-11-15 | Stop reason: HOSPADM

## 2024-11-12 RX ORDER — SODIUM CHLORIDE, SODIUM LACTATE, POTASSIUM CHLORIDE, CALCIUM CHLORIDE 600; 310; 30; 20 MG/100ML; MG/100ML; MG/100ML; MG/100ML
INJECTION, SOLUTION INTRAVENOUS CONTINUOUS
Status: DISCONTINUED | OUTPATIENT
Start: 2024-11-12 | End: 2024-11-12 | Stop reason: HOSPADM

## 2024-11-12 RX ORDER — PROTAMINE SULFATE 10 MG/ML
INJECTION, SOLUTION INTRAVENOUS
Status: DISCONTINUED | OUTPATIENT
Start: 2024-11-12 | End: 2024-11-12 | Stop reason: SDUPTHER

## 2024-11-12 RX ORDER — FENTANYL CITRATE 50 UG/ML
INJECTION, SOLUTION INTRAMUSCULAR; INTRAVENOUS
Status: DISCONTINUED | OUTPATIENT
Start: 2024-11-12 | End: 2024-11-12 | Stop reason: SDUPTHER

## 2024-11-12 RX ORDER — SODIUM CHLORIDE 0.9 % (FLUSH) 0.9 %
5-40 SYRINGE (ML) INJECTION PRN
Status: DISCONTINUED | OUTPATIENT
Start: 2024-11-12 | End: 2024-11-12 | Stop reason: HOSPADM

## 2024-11-12 RX ORDER — ASPIRIN 81 MG/1
81 TABLET ORAL DAILY
Status: DISCONTINUED | OUTPATIENT
Start: 2024-11-13 | End: 2024-11-15 | Stop reason: HOSPADM

## 2024-11-12 RX ORDER — PHENYLEPHRINE HCL IN 0.9% NACL 0.4MG/10ML
SYRINGE (ML) INTRAVENOUS
Status: DISCONTINUED | OUTPATIENT
Start: 2024-11-12 | End: 2024-11-12 | Stop reason: SDUPTHER

## 2024-11-12 RX ORDER — SODIUM CHLORIDE 0.9 % (FLUSH) 0.9 %
5-40 SYRINGE (ML) INJECTION PRN
Status: DISCONTINUED | OUTPATIENT
Start: 2024-11-12 | End: 2024-11-15 | Stop reason: HOSPADM

## 2024-11-12 RX ORDER — INSULIN LISPRO 100 [IU]/ML
1-20 INJECTION, SOLUTION INTRAVENOUS; SUBCUTANEOUS
Status: ACTIVE | OUTPATIENT
Start: 2024-11-12 | End: 2024-11-14

## 2024-11-12 RX ORDER — ALBUMIN HUMAN 50 G/1000ML
12.5 SOLUTION INTRAVENOUS PRN
Status: COMPLETED | OUTPATIENT
Start: 2024-11-12 | End: 2024-11-12

## 2024-11-12 RX ORDER — PAPAVERINE HYDROCHLORIDE 30 MG/ML
INJECTION INTRAMUSCULAR; INTRAVENOUS PRN
Status: DISCONTINUED | OUTPATIENT
Start: 2024-11-12 | End: 2024-11-12 | Stop reason: ALTCHOICE

## 2024-11-12 RX ORDER — ACETAMINOPHEN 500 MG
1000 TABLET ORAL ONCE
Status: COMPLETED | OUTPATIENT
Start: 2024-11-12 | End: 2024-11-12

## 2024-11-12 RX ORDER — ONDANSETRON 2 MG/ML
4 INJECTION INTRAMUSCULAR; INTRAVENOUS ONCE
Status: DISCONTINUED | OUTPATIENT
Start: 2024-11-12 | End: 2024-11-14

## 2024-11-12 RX ORDER — INSULIN LISPRO 100 [IU]/ML
0-12 INJECTION, SOLUTION INTRAVENOUS; SUBCUTANEOUS
Status: DISCONTINUED | OUTPATIENT
Start: 2024-11-14 | End: 2024-11-15 | Stop reason: HOSPADM

## 2024-11-12 RX ORDER — AMIODARONE HYDROCHLORIDE 200 MG/1
400 TABLET ORAL 2 TIMES DAILY
Status: DISCONTINUED | OUTPATIENT
Start: 2024-11-13 | End: 2024-11-15 | Stop reason: HOSPADM

## 2024-11-12 RX ORDER — HEPARIN SODIUM 10000 [USP'U]/ML
INJECTION, SOLUTION INTRAVENOUS; SUBCUTANEOUS PRN
Status: DISCONTINUED | OUTPATIENT
Start: 2024-11-12 | End: 2024-11-12 | Stop reason: ALTCHOICE

## 2024-11-12 RX ORDER — IPRATROPIUM BROMIDE AND ALBUTEROL SULFATE 2.5; .5 MG/3ML; MG/3ML
1 SOLUTION RESPIRATORY (INHALATION) EVERY 4 HOURS PRN
Status: DISCONTINUED | OUTPATIENT
Start: 2024-11-12 | End: 2024-11-15 | Stop reason: HOSPADM

## 2024-11-12 RX ORDER — SODIUM CHLORIDE 9 MG/ML
INJECTION, SOLUTION INTRAVENOUS CONTINUOUS
Status: DISCONTINUED | OUTPATIENT
Start: 2024-11-12 | End: 2024-11-15 | Stop reason: HOSPADM

## 2024-11-12 RX ORDER — DESMOPRESSIN ACETATE 4 UG/ML
INJECTION, SOLUTION INTRAVENOUS; SUBCUTANEOUS
Status: DISCONTINUED | OUTPATIENT
Start: 2024-11-12 | End: 2024-11-12 | Stop reason: SDUPTHER

## 2024-11-12 RX ORDER — INSULIN LISPRO 100 [IU]/ML
0-6 INJECTION, SOLUTION INTRAVENOUS; SUBCUTANEOUS NIGHTLY
Status: DISCONTINUED | OUTPATIENT
Start: 2024-11-14 | End: 2024-11-15 | Stop reason: HOSPADM

## 2024-11-12 RX ORDER — DEXMEDETOMIDINE HYDROCHLORIDE 4 UG/ML
.1-1.5 INJECTION, SOLUTION INTRAVENOUS CONTINUOUS
Status: DISCONTINUED | OUTPATIENT
Start: 2024-11-12 | End: 2024-11-14

## 2024-11-12 RX ORDER — SENNA AND DOCUSATE SODIUM 50; 8.6 MG/1; MG/1
1 TABLET, FILM COATED ORAL 2 TIMES DAILY
Status: DISCONTINUED | OUTPATIENT
Start: 2024-11-12 | End: 2024-11-15 | Stop reason: HOSPADM

## 2024-11-12 RX ORDER — PROCHLORPERAZINE EDISYLATE 5 MG/ML
10 INJECTION INTRAMUSCULAR; INTRAVENOUS EVERY 6 HOURS PRN
Status: DISCONTINUED | OUTPATIENT
Start: 2024-11-12 | End: 2024-11-15 | Stop reason: HOSPADM

## 2024-11-12 RX ORDER — MAGNESIUM SULFATE IN WATER 40 MG/ML
2000 INJECTION, SOLUTION INTRAVENOUS PRN
Status: DISCONTINUED | OUTPATIENT
Start: 2024-11-12 | End: 2024-11-14

## 2024-11-12 RX ORDER — HEPARIN SODIUM 1000 [USP'U]/ML
INJECTION, SOLUTION INTRAVENOUS; SUBCUTANEOUS
Status: DISCONTINUED | OUTPATIENT
Start: 2024-11-12 | End: 2024-11-12 | Stop reason: SDUPTHER

## 2024-11-12 RX ORDER — ALBUMIN HUMAN 50 G/1000ML
12.5 SOLUTION INTRAVENOUS ONCE
Status: COMPLETED | OUTPATIENT
Start: 2024-11-12 | End: 2024-11-12

## 2024-11-12 RX ORDER — POLYETHYLENE GLYCOL 3350 17 G/17G
17 POWDER, FOR SOLUTION ORAL DAILY
Status: DISCONTINUED | OUTPATIENT
Start: 2024-11-13 | End: 2024-11-15 | Stop reason: HOSPADM

## 2024-11-12 RX ORDER — HYDROMORPHONE HYDROCHLORIDE 1 MG/ML
0.5 INJECTION, SOLUTION INTRAMUSCULAR; INTRAVENOUS; SUBCUTANEOUS EVERY 4 HOURS PRN
Status: DISCONTINUED | OUTPATIENT
Start: 2024-11-12 | End: 2024-11-12

## 2024-11-12 RX ORDER — OXYCODONE HYDROCHLORIDE 5 MG/1
10 TABLET ORAL EVERY 4 HOURS PRN
Status: DISCONTINUED | OUTPATIENT
Start: 2024-11-12 | End: 2024-11-13

## 2024-11-12 RX ORDER — AMIODARONE HYDROCHLORIDE 200 MG/1
TABLET ORAL
Refills: 0 | OUTPATIENT
Start: 2024-11-12

## 2024-11-12 RX ORDER — CHLORHEXIDINE GLUCONATE ORAL RINSE 1.2 MG/ML
15 SOLUTION DENTAL 2 TIMES DAILY
Status: DISCONTINUED | OUTPATIENT
Start: 2024-11-12 | End: 2024-11-15 | Stop reason: HOSPADM

## 2024-11-12 RX ORDER — OXYCODONE HYDROCHLORIDE 5 MG/1
5 TABLET ORAL EVERY 4 HOURS PRN
Status: DISCONTINUED | OUTPATIENT
Start: 2024-11-12 | End: 2024-11-13

## 2024-11-12 RX ORDER — FAMOTIDINE 20 MG/1
20 TABLET, FILM COATED ORAL 2 TIMES DAILY
Status: DISCONTINUED | OUTPATIENT
Start: 2024-11-12 | End: 2024-11-15 | Stop reason: HOSPADM

## 2024-11-12 RX ORDER — SODIUM CHLORIDE 0.9 % (FLUSH) 0.9 %
5-40 SYRINGE (ML) INJECTION EVERY 12 HOURS SCHEDULED
Status: DISCONTINUED | OUTPATIENT
Start: 2024-11-12 | End: 2024-11-12 | Stop reason: HOSPADM

## 2024-11-12 RX ORDER — MIDAZOLAM HYDROCHLORIDE 2 MG/2ML
1 INJECTION, SOLUTION INTRAMUSCULAR; INTRAVENOUS
Status: DISCONTINUED | OUTPATIENT
Start: 2024-11-12 | End: 2024-11-14

## 2024-11-12 RX ORDER — METOPROLOL TARTRATE 25 MG/1
12.5 TABLET, FILM COATED ORAL ONCE
Status: DISCONTINUED | OUTPATIENT
Start: 2024-11-12 | End: 2024-11-12 | Stop reason: HOSPADM

## 2024-11-12 RX ORDER — FENTANYL CITRATE 50 UG/ML
100 INJECTION, SOLUTION INTRAMUSCULAR; INTRAVENOUS
Status: COMPLETED | OUTPATIENT
Start: 2024-11-12 | End: 2024-11-12

## 2024-11-12 RX ADMIN — SODIUM CHLORIDE, PRESERVATIVE FREE 10 ML: 5 INJECTION INTRAVENOUS at 20:23

## 2024-11-12 RX ADMIN — SODIUM CHLORIDE 1 MG/HR: 9 INJECTION, SOLUTION INTRAVENOUS at 07:40

## 2024-11-12 RX ADMIN — POTASSIUM CHLORIDE 20 MEQ: 29.8 INJECTION, SOLUTION INTRAVENOUS at 21:03

## 2024-11-12 RX ADMIN — FAMOTIDINE 20 MG: 10 INJECTION, SOLUTION INTRAVENOUS at 13:04

## 2024-11-12 RX ADMIN — INSULIN HUMAN 5 UNITS: 100 INJECTION, SOLUTION PARENTERAL at 10:29

## 2024-11-12 RX ADMIN — SODIUM CHLORIDE 1500 MG: 9 INJECTION, SOLUTION INTRAVENOUS at 06:49

## 2024-11-12 RX ADMIN — ALBUMIN (HUMAN) 12.5 G: 12.5 INJECTION, SOLUTION INTRAVENOUS at 14:03

## 2024-11-12 RX ADMIN — SODIUM CHLORIDE, POTASSIUM CHLORIDE, SODIUM LACTATE AND CALCIUM CHLORIDE 500 ML: 600; 310; 30; 20 INJECTION, SOLUTION INTRAVENOUS at 16:30

## 2024-11-12 RX ADMIN — AMINOCAPROIC ACID 10 G/HR: 250 INJECTION, SOLUTION INTRAVENOUS at 07:35

## 2024-11-12 RX ADMIN — DEXMEDETOMIDINE 0.3 MCG/KG/HR: 100 INJECTION, SOLUTION, CONCENTRATE INTRAVENOUS at 07:34

## 2024-11-12 RX ADMIN — Medication 80 MCG: at 10:59

## 2024-11-12 RX ADMIN — MUPIROCIN: 20 OINTMENT TOPICAL at 13:04

## 2024-11-12 RX ADMIN — SODIUM CHLORIDE 100 MCG: 9 INJECTION, SOLUTION INTRAVENOUS at 07:53

## 2024-11-12 RX ADMIN — SODIUM CHLORIDE: 9 INJECTION, SOLUTION INTRAVENOUS at 07:09

## 2024-11-12 RX ADMIN — MUPIROCIN: 20 OINTMENT TOPICAL at 20:23

## 2024-11-12 RX ADMIN — HYDROMORPHONE HYDROCHLORIDE 0.5 MG: 1 INJECTION, SOLUTION INTRAMUSCULAR; INTRAVENOUS; SUBCUTANEOUS at 14:06

## 2024-11-12 RX ADMIN — FAMOTIDINE 20 MG: 10 INJECTION, SOLUTION INTRAVENOUS at 20:29

## 2024-11-12 RX ADMIN — DEXAMETHASONE SODIUM PHOSPHATE 4 MG: 4 INJECTION INTRA-ARTICULAR; INTRALESIONAL; INTRAMUSCULAR; INTRAVENOUS; SOFT TISSUE at 08:11

## 2024-11-12 RX ADMIN — CHLORHEXIDINE GLUCONATE 15 ML: 1.2 RINSE ORAL at 13:04

## 2024-11-12 RX ADMIN — ONDANSETRON 4 MG: 2 INJECTION INTRAMUSCULAR; INTRAVENOUS at 17:20

## 2024-11-12 RX ADMIN — NICARDIPINE HYDROCHLORIDE 1 MG/HR: 0.1 INJECTION, SOLUTION INTRAVENOUS at 14:16

## 2024-11-12 RX ADMIN — DESMOPRESSIN ACETATE 28 MCG: 4 INJECTION, SOLUTION INTRAVENOUS; SUBCUTANEOUS at 10:42

## 2024-11-12 RX ADMIN — OXYCODONE 10 MG: 5 TABLET ORAL at 20:08

## 2024-11-12 RX ADMIN — ALBUMIN (HUMAN) 12.5 G: 12.5 INJECTION, SOLUTION INTRAVENOUS at 12:29

## 2024-11-12 RX ADMIN — PROPOFOL 100 MG: 10 INJECTION, EMULSION INTRAVENOUS at 07:13

## 2024-11-12 RX ADMIN — FENTANYL CITRATE 100 MCG: 50 INJECTION, SOLUTION INTRAMUSCULAR; INTRAVENOUS at 10:38

## 2024-11-12 RX ADMIN — ALBUMIN (HUMAN) 12.5 G: 12.5 INJECTION, SOLUTION INTRAVENOUS at 14:47

## 2024-11-12 RX ADMIN — Medication 80 MCG: at 08:30

## 2024-11-12 RX ADMIN — ROCURONIUM BROMIDE 100 MG: 10 INJECTION, SOLUTION INTRAVENOUS at 07:14

## 2024-11-12 RX ADMIN — ROCURONIUM BROMIDE 10 MG: 10 INJECTION, SOLUTION INTRAVENOUS at 11:06

## 2024-11-12 RX ADMIN — FENTANYL CITRATE 50 MCG: 50 INJECTION, SOLUTION INTRAMUSCULAR; INTRAVENOUS at 07:54

## 2024-11-12 RX ADMIN — FENTANYL CITRATE 100 MCG: 50 INJECTION INTRAMUSCULAR; INTRAVENOUS at 06:52

## 2024-11-12 RX ADMIN — PROTAMINE SULFATE 300 MG: 10 INJECTION, SOLUTION INTRAVENOUS at 10:36

## 2024-11-12 RX ADMIN — HYDROMORPHONE HYDROCHLORIDE 0.5 MG: 1 INJECTION, SOLUTION INTRAMUSCULAR; INTRAVENOUS; SUBCUTANEOUS at 21:58

## 2024-11-12 RX ADMIN — SUGAMMADEX 200 MG: 100 INJECTION, SOLUTION INTRAVENOUS at 11:58

## 2024-11-12 RX ADMIN — ACETAMINOPHEN 1000 MG: 500 TABLET ORAL at 18:16

## 2024-11-12 RX ADMIN — ROCURONIUM BROMIDE 20 MG: 10 INJECTION, SOLUTION INTRAVENOUS at 08:34

## 2024-11-12 RX ADMIN — ALBUMIN (HUMAN) 12.5 G: 12.5 INJECTION, SOLUTION INTRAVENOUS at 20:14

## 2024-11-12 RX ADMIN — LIDOCAINE HYDROCHLORIDE 100 MG: 20 INJECTION, SOLUTION EPIDURAL; INFILTRATION; INTRACAUDAL; PERINEURAL at 07:13

## 2024-11-12 RX ADMIN — SODIUM CHLORIDE 2.38 UNITS/HR: 9 INJECTION, SOLUTION INTRAVENOUS at 08:50

## 2024-11-12 RX ADMIN — MIDAZOLAM 4 MG: 1 INJECTION INTRAMUSCULAR; INTRAVENOUS at 06:52

## 2024-11-12 RX ADMIN — FENTANYL CITRATE 50 MCG: 50 INJECTION, SOLUTION INTRAMUSCULAR; INTRAVENOUS at 07:53

## 2024-11-12 RX ADMIN — ACETAMINOPHEN 1000 MG: 500 TABLET ORAL at 06:17

## 2024-11-12 RX ADMIN — PHENYLEPHRINE HYDROCHLORIDE 40 MCG/MIN: 10 INJECTION INTRAVENOUS at 07:16

## 2024-11-12 RX ADMIN — HYDROMORPHONE HYDROCHLORIDE 0.25 MG: 1 INJECTION, SOLUTION INTRAMUSCULAR; INTRAVENOUS; SUBCUTANEOUS at 11:10

## 2024-11-12 RX ADMIN — SODIUM CHLORIDE 200 MCG: 9 INJECTION, SOLUTION INTRAVENOUS at 08:12

## 2024-11-12 RX ADMIN — HEPARIN SODIUM 30000 UNITS: 1000 INJECTION, SOLUTION INTRAVENOUS; SUBCUTANEOUS at 08:30

## 2024-11-12 RX ADMIN — FENTANYL CITRATE 250 MCG: 50 INJECTION, SOLUTION INTRAMUSCULAR; INTRAVENOUS at 07:13

## 2024-11-12 RX ADMIN — Medication 40 MCG: at 08:27

## 2024-11-12 RX ADMIN — OXYCODONE 10 MG: 5 TABLET ORAL at 16:07

## 2024-11-12 RX ADMIN — HYDROMORPHONE HYDROCHLORIDE 0.25 MG: 1 INJECTION, SOLUTION INTRAMUSCULAR; INTRAVENOUS; SUBCUTANEOUS at 11:28

## 2024-11-12 RX ADMIN — FENTANYL CITRATE 50 MCG: 50 INJECTION, SOLUTION INTRAMUSCULAR; INTRAVENOUS at 08:18

## 2024-11-12 RX ADMIN — SODIUM CHLORIDE, POTASSIUM CHLORIDE, SODIUM LACTATE AND CALCIUM CHLORIDE: 600; 310; 30; 20 INJECTION, SOLUTION INTRAVENOUS at 06:42

## 2024-11-12 RX ADMIN — CHLORHEXIDINE GLUCONATE 15 ML: 1.2 RINSE ORAL at 20:23

## 2024-11-12 RX ADMIN — HYDROMORPHONE HYDROCHLORIDE 0.5 MG: 1 INJECTION, SOLUTION INTRAMUSCULAR; INTRAVENOUS; SUBCUTANEOUS at 14:32

## 2024-11-12 RX ADMIN — ONDANSETRON 4 MG: 2 INJECTION INTRAMUSCULAR; INTRAVENOUS at 08:11

## 2024-11-12 RX ADMIN — NICARDIPINE HYDROCHLORIDE 1 MG/HR: 0.1 INJECTION, SOLUTION INTRAVENOUS at 14:00

## 2024-11-12 ASSESSMENT — PAIN DESCRIPTION - LOCATION
LOCATION: CHEST

## 2024-11-12 ASSESSMENT — PAIN SCALES - GENERAL
PAINLEVEL_OUTOF10: 2
PAINLEVEL_OUTOF10: 2
PAINLEVEL_OUTOF10: 0
PAINLEVEL_OUTOF10: 6
PAINLEVEL_OUTOF10: 2
PAINLEVEL_OUTOF10: 6

## 2024-11-12 ASSESSMENT — PAIN DESCRIPTION - DESCRIPTORS
DESCRIPTORS: ACHING
DESCRIPTORS: ACHING

## 2024-11-12 ASSESSMENT — PAIN DESCRIPTION - ORIENTATION
ORIENTATION: ANTERIOR

## 2024-11-12 ASSESSMENT — PULMONARY FUNCTION TESTS: PIF_VALUE: 17

## 2024-11-12 NOTE — PROGRESS NOTES
1210: Pt. Received from OR. JOAQUIN Najera at bedside. Hemodynamic goals given, orders to wean to extubate as patient tolerates.   Pt. Currently AV paced at 70/20/20. Underlying rhythm 30's in OR.   OR out: 1153    1226: CXR at bedside.     1350: Pt. Extubated to BIPAP.    1630: x3 albumin administered. Pt's CVP 0, scotty titrated up. Dr. Abbasi at bedside. Orders for 500 LR.     1720: Pt. Vomited small amount. Zofran administered.     1730: Feet to floor performed. Pt. Hemodynamically stable. Back to bed.     1820: Pt's underlying rhythm NSR in the 60's. Patient changed to AAI 70/7 for pressure.     1900: Dr. Boykin at bedside. Updated on patient status, orders for more volume.     2000: Bedside and Verbal shift change report given to CHELLY Short(oncoming nurse) by CHELLY Lee (offgoing nurse). Report included the following information SBAR, Kardex, OR Summary, Intake/Output, MAR, Recent Results, Cardiac Rhythm- A paced , and Alarm Parameters .     Dual skin completed with CHELLY Short.

## 2024-11-12 NOTE — ANESTHESIA PRE PROCEDURE
Nursing notes reviewed  Airway: Mallampati: II  TM distance: >3 FB   Neck ROM: full  Mouth opening: > = 3 FB   Dental: normal exam         Pulmonary: breath sounds clear to auscultation  (+)     sleep apnea:                                  Cardiovascular:  Exercise tolerance: poor (<4 METS)  (+) hypertension:, past MI:, CAD:, hyperlipidemia        Rhythm: regular  Rate: normal                 ROS comment: Left Ventricle: Normal left ventricular systolic function with a visually estimated EF of 55 - 60%. Left ventricle size is normal. Mildly increased wall thickness. Normal wall motion. Normal diastolic function.  ·  Right Ventricle: Normal systolic function. TAPSE is normal. TAPSE is 1.7 cm.  ·  Tricuspid Valve: Normal RVSP. Est RA pressure is 8 mmHg. The estimated RVSP is 18 mmHg.  ·  Aorta: Normal sized aortic root. Ao root diameter is 3.4 cm.  ·  Image quality is good.       Neuro/Psych:   Negative Neuro/Psych ROS              GI/Hepatic/Renal: Neg GI/Hepatic/Renal ROS            Endo/Other: Negative Endo/Other ROS                    Abdominal: normal exam            Vascular: negative vascular ROS.         Other Findings:             Anesthesia Plan      general     ASA 4       Induction: intravenous.  arterial line, central line, BIS, CVP and VANESSA  MIPS: Postoperative opioids intended, Prophylactic antiemetics administered and Postoperative ventilation.  Anesthetic plan and risks discussed with patient.    Use of blood products discussed with patient whom consented to blood products.    Plan discussed with CRNA.                    Kj Nolen MD   11/12/2024            
17-Sep-2018 16:41

## 2024-11-12 NOTE — PROGRESS NOTES
Occupational Therapy  11/12/24    Orders received, chart review completed. Note patient POD #0 s/p CABG x3. OT will follow up tomorrow for evaluation. Recommend OOB to chair three times a day for meals, self-completion of ADLs as able and medically stable.     Thank you,   Vivien Roper OTD, OTR/L

## 2024-11-12 NOTE — CONSULTS
CRITICAL CARE ADMISSION NOTE      Name: Pedro Montes De Oca   : 1960   MRN: [id number]   Date: 2024      Reason for ICU Admission: s/p CABG    ICU PROBLEM LIST   Multivessel CAD s/p CABG x3, prior PCI  HTN  TIA    HISTORY OF PRESENT ILLNESS:   Pt is a 65yo M w/ PMH as noted above who presents to CVICU post planned CABG x3 (LIMA-LAD, SVG-RCA, Radial-OM1). Uncomplicated intra op course.    VANESSA Preserved biventricular function, possible dynamic LVOT obstruction     CPB 88 min  XC 76 min      ml, w/ 660 ml Cell saver     CXR w/ expected post sternotomy changes, supportive lines in place      2 atrial wires, 1 bipolar ventricular wires, 3 francis drains       24 HOUR EVENTS:   Pt arrives to CVICU in DDD pacing, intubated, sedated on precedex, phenylephrine, cardene, insulin gtt     NEUROLOGICAL:    On Precedex, wean  Monitor mentation as awakens off sedation  As needed pain regimen     PULMONOLOGY:   Lung protective mechanical ventilation  Goal extubation postop day 0  As needed nebs  IS, PFV, out of bed to chair as tolerated once extubated     CARDIOVASCULAR:   Wean phenylephrine as tolerated   Cardene for vasospasm ppx  Goal MAP greater than 65, SBP less than 130, CI >2, CO >3.5  Pacer wires and Francis drain management per CT surgery   Telemetry     GASTROINTESTINAL:   Pepcid  ADAT once extubated     RENAL/ELECTROLYTE/FLUIDS:   Strict ins and outs  Daily renal panel  Monitor and replace electrolytes     ENDOCRINE:   Insulin drip per protocol  Glucose goal 120-180     HEMATOLOGY/ONCOLOGY:   SCDs  Chemical prophylaxis as cleared by surgery     ID/MICRO:      Perioperative vanc ( ampicillin allergy)     ICU DAILY CHECKLIST      Code Status: Full  DVT Prophylaxis: SCDs  T/L/D: ETT, CVC, A-line, Francis x3, Mcconnell   SUP: Pepcid  Diet: ADA T  Activity Level: Ad jane.  ABCDEF Bundle/Checklist Completed:Yes  Disposition: Stay in ICU  Multidisciplinary Rounds Completed:  Yes  Patient/Family Updated: Yes       Review  of Systems:     Review of Systems   Unable to obtain 2/2 intubation    Past Medical History:      has a past medical history of Coronary artery disease involving native coronary artery, HTN (hypertension), and Sleep apnea.    Past Surgical History:      has a past surgical history that includes Cardiac valuve replacement; Cardiac procedure (N/A, 10/18/2024); Cardiac procedure (N/A, 10/18/2024); invasive vascular (N/A, 10/18/2024); and Colonoscopy.    Home Medications:     Prior to Admission medications    Medication Sig Start Date End Date Taking? Authorizing Provider   vitamin D (VITAMIN D3) 50 MCG (2000 UT) CAPS capsule Take 1 capsule by mouth daily   Yes Dot Sargent MD   chlorhexidine (PERIDEX) 0.12 % solution Swish and spit 15 mLs 2 times daily for 14 days 11/10/24 11/24/24 Yes Imani Stout APRN - NP   mupirocin (BACTROBAN) 2 % ointment by Each Nostril route 2 times daily 11/10/24  Yes Imani Stout APRN - NP   amiodarone (CORDARONE) 200 MG tablet Take 2 tablets by mouth 2 times daily 11/7/24  Yes Imani Stout APRN - NP   atorvastatin (LIPITOR) 80 MG tablet TAKE 1 TABLET BY MOUTH NIGHTLY  Patient taking differently: Take 1 tablet by mouth nightly 10/17/23  Yes Stef Phillips MD   metoprolol tartrate (LOPRESSOR) 25 MG tablet TAKE 1/2 TABLET BY MOUTH TWICE A DAY 10/17/23  Yes Stef Phillips MD   isosorbide mononitrate (IMDUR) 30 MG extended release tablet TAKE 1 TABLET BY MOUTH EVERY DAY 10/17/23  Yes Stef Phillips MD   aspirin 81 MG chewable tablet Take 1 tablet by mouth daily 10/3/17  Yes Automatic Reconciliation, Ar   ibuprofen (ADVIL;MOTRIN) 200 MG tablet Take 1 tablet by mouth every 6 hours as needed for Pain  Patient not taking: Reported on 11/6/2024    Dot Sargent MD   acetaminophen (TYLENOL) 325 MG tablet Take 2 tablets by mouth every 6 hours as needed for Pain    Dot Sargent MD   Cholecalciferol (VITAMIN D3) 1.25 MG (79768 UT) CAPS Take by mouth  Patient not taking:  less than 2 seconds.      Coloration: Skin is not jaundiced.   Neurological:      Comments: sedated   Psychiatric:      Comments: sedated           BP (!) 94/48   Pulse 70   Temp 98.6 °F (37 °C)   Resp 18   Ht 1.778 m (5' 10\")   Wt 95.7 kg (211 lb)   SpO2 100%   BMI 30.28 kg/m²      Temp (24hrs), Av.6 °F (37 °C), Min:98.6 °F (37 °C), Max:98.6 °F (37 °C)           Intake/Output:     Intake/Output Summary (Last 24 hours) at 2024 1228  Last data filed at 2024 1207  Gross per 24 hour   Intake 400 ml   Output 1050 ml   Net -650 ml       Imaging    No valid procedures specified.         CRITICAL CARE DOCUMENTATION  I had a face to face encounter with the patient, reviewed and interpreted patient data including clinical events, labs, images, vital signs, I/O's, and examined patient.  I have discussed the case and the plan and management of the patient's care with the consulting services, the bedside nurses and the respiratory therapist.      NOTE OF PERSONAL INVOLVEMENT IN CARE   This patient has a high probability of imminent, clinically significant deterioration, which requires the highest level of preparedness to intervene urgently. I participated in the decision-making and personally managed or directed the management of the following life and organ supporting interventions that required my frequent assessment to treat or prevent imminent deterioration.    I personally spent 45 minutes of critical care time.  This is time spent at this critically ill patient's bedside actively involved in patient care as well as the coordination of care.  This does not include any procedural time which has been billed separately.    Jayson Abbasi MD  Staff Intensivist  [facility]

## 2024-11-12 NOTE — ANESTHESIA PROCEDURE NOTES
Arterial Line:    An arterial line was placed using surface landmarks, in the pre-op for the following indication(s): continuous blood pressure monitoring and blood sampling needed.    A 20 gauge (size) (length), Arrow (type) catheter was placed, Seldinger technique used, into the right radial artery, secured by Tegaderm.  Anesthesia type: Local  Local infiltration: Injection    Events:  patient tolerated procedure well with no complications.11/12/2024 10:24 AM  Anesthesiologist: Kj Nolen MD  Performed: Anesthesiologist   Preanesthetic Checklist  Completed: patient identified, IV checked, site marked, risks and benefits discussed, surgical/procedural consents, equipment checked, pre-op evaluation, timeout performed, anesthesia consent given, oxygen available, monitors applied/VS acknowledged and fire risk safety assessment completed and verbalized

## 2024-11-12 NOTE — PROGRESS NOTES
Cardiac Surgery Coordinator- No family present, placed call to Mrs Montes De Oca. No answer, left voicemail and contact information.    1030-Met with the wife of Pedro Montes De Oca,  Provided her with an update from OR.  Will continue to update throughout the day.     1145- Met with Pedro VENEGAS Tavon's wife and Dr. Matson Update given, Encouraged her to verbalize and offered emotional support.  Reinforced Surgical waiting room instructions, she is to wait in the main surgical waiting room until contacted by the nursing staff.    1300- Escorted Mrs Montes De Oca to the bedside in CVICU, reviewed post op plan of care and encouraged her to verbalize and offered emotional support. Exchanged contact information and offered emotional support. She will be going home for the day.

## 2024-11-12 NOTE — ANESTHESIA PROCEDURE NOTES
Central Venous Line:    A central venous line was placed using ultrasound guidance and surface landmarks, in the pre-op for the following indication(s): central venous access and CVP monitoring.11/12/2024 10:24 AM    Sterility preparation included the following: provider used sterile gloves, gown, hat and mask, hand hygiene performed prior to central venous catheter insertion, sterile gel and probe cover used in ultrasound-guided central venous catheter insertion and maximum sterile barriers used during central venous catheter insertion.    The patient was placed in Trendelenburg position.The right internal jugular vein was prepped.    The site was prepped with Chloraprep.  A 9 Fr (size), 12 (length), introducer double lumen was placed.    During the procedure, the following specific steps were taken: target vein identified, needle advanced into vein and blood aspirated and guidewire advanced into vein.    Intravenous verification was obtained by ultrasound, venous blood return and manometry.    Post insertion care included: all ports aspirated, all ports flushed easily, guidewire removed intact, Biopatch applied, line sutured in place and dressing applied.    During the procedure the patient experienced: patient tolerated procedure well with no complications.      Outcomes: uncomplicated  Anesthesia type: local..No  Staffing  Performed: Anesthesiologist   Anesthesiologist: Kj Nolen MD  Performed by: Kj Nolen MD  Authorized by: Kj Nolen MD    Preanesthetic Checklist  Completed: patient identified, IV checked, site marked, risks and benefits discussed, surgical/procedural consents, equipment checked, pre-op evaluation, timeout performed, anesthesia consent given, oxygen available, monitors applied/VS acknowledged and fire risk safety assessment completed and verbalized

## 2024-11-12 NOTE — OP NOTE
Coronary Artery Bypass Graft Procedure Note      Pre-operative Diagnosis:                                              Disease of the native coronary arteries                                              Left main CAD    Post-operative Diagnosis: same    Surgeon: Gonzales Matson MD    Assistant: Porfirio NEWMAN    Anesthesia: General endotracheal anesthesia    EBL:  See perfusion record    Specimen none    Implants none    Complications none        Operation: Coronary Bypass Grafting Procedure(s):  CORONARY ARTERY BYPASS GRAFTING X 3, RESVGH, LEFT RADIAL ARTERY HARVEST, ECC, VANESSA AND EPIAORTIC U/S BY DR PAINTER  CALDERÓN to Lad  L radial artery to high OM1  SVG to RCA     Operative Findings:  At the time of the procedure there normal LV function .  The coronary arteries were diffusely diseased The LISS was good quality with good flow.  The vein was of good quality. The radial was of good quality     Indications:  See pre op diagnosis     Procedure Details     After informed consent was obtained for the above mentioned procedure, the patient was then taken to the operating room.  Appropriate monitoring lines were placed by the Anesthesia Department.   A complete surgical timeout was completed. The VANESSA report was reviewed with anesthesia.  After administration of general endotracheal anesthesia, the patient was prepped and draped in the usual sterile fashion. The chest was then entered through a standard mediastinotomy incision while simultaneously harvesting of peripheral conduit was undertaken using endoscopic technique following a bolus of heparin.  Both saphenous vein and L radial artery wer harvested  After harvesting the conduit, it was inspected and noted to be adequate. The epiaortic ultrasound was performed and reviewed. The left internal mammary harvested was then undertaken in pedicle distal pedicle was incised and noted to bleed briskly.  LISS was prepared with papaverine.  The pericardium was then opened and

## 2024-11-12 NOTE — PROGRESS NOTES
4 Eyes Skin Assessment     NAME:  Pedro Montes De Oca  YOB: 1960  MEDICAL RECORD NUMBER:  [id number]    The patient is being assessed for  Post-Op Surgical    I agree that at least one RN has performed a thorough Head to Toe Skin Assessment on the patient. ALL assessment sites listed below have been assessed.      Areas assessed by both nurses:    Head, Face, Ears, Shoulders, Back, Chest, Arms, Elbows, Hands, Sacrum. Buttock, Coccyx, Ischium, Legs. Feet and Heels, and Under Medical Devices         Does the Patient have a Wound? No noted wound(s)       Dandy Prevention initiated by RN: Yes  Wound Care Orders initiated by RN: No    Pressure Injury (Stage 3,4, Unstageable, DTI, NWPT, and Complex wounds) if present, place Wound referral order by RN under : No    New Ostomies, if present place, Ostomy referral order under : No     Nurse 1 eSignature: Electronically signed by Rosa Plata RN on 11/12/24 at 4:26 PM EST    **SHARE this note so that the co-signing nurse can place an eSignature**    Nurse 2 eSignature: Electronically signed by Winter Guerrero RN on 11/12/24 at 4:29 PM EST

## 2024-11-12 NOTE — ANESTHESIA PROCEDURE NOTES
Procedure Performed: VANESSA       Start Time:  11/12/2024 10:24 AM       End Time:      Anesthesia Information  Performed Personally  Anesthesiologist:  Kj Nolen MD        Preanesthesia Checklist:  Patient identified, IV assessed, risks and benefits discussed, monitors and equipment assessed, procedure being performed at surgeon's request and anesthesia consent obtained.    General Procedure Information  Diagnostic Indications for Echo:  assessment of ascending aorta, assessment of surgical repair, hemodynamic monitoring and assessment of valve function  Location performed:  OR  Intubated  Bite block placed  Heart visualized  Probe Insertion:  Easy  Probe Type:  3D, mulitplane, epiaortic and biplane  Modalities:  2D, 3D, color flow mapping, continuous wave Doppler, pulse wave Doppler and M-mode    Echocardiographic and Doppler Measurements    Ventricles    Ventricle  Cavity Size  Cavity          Dimension  Hypertrophy  Thrombus  Global FXN  EF    RV  normal    No  No  normal      LV  normal    No  No  normal (50-70%)         Ventricular Regional Function:  1- Basal Anteroseptal:  normal  2- Basal Anterior:  normal  3- Basal Anterolateral:  normal  4- Basal Inferolateral:  normal  5- Basal Inferior:  normal  6- Basal Inferoseptal:  normal  7- Mid Anteroseptal:  normal  8- Mid Anterior:  normal  9- Mid Anterolateral:  normal  10- Mid Inferolateral:  normal  11- Mid Inferior:  normal  12- Mid Inferoseptal:  normal  13- Apical Anterior:  normal  14- Apical Lateral:  normal  15- Apical Inferior:  normal  16- Apical Septal:  normal  17- [place]:  normal    Ventricular Wall Motion Comments:  No WMA    Valves     Valves  Annulus  Stenosis Measurements   Regurg  Leaflet   Morph  Leaflet   Motion Valve Comments    Aortic normal Stenosis none.            none   normal normal Tri-leaflet AV with no stenosis and no AI.      Mitral normal none             mild normal normal Normal appearing MV.   Tricuspid normal   not

## 2024-11-12 NOTE — PROGRESS NOTES
Physical Therapy 11/12/2024    Orders received and chart reviewed up to date. Pt POD 0 CABG. Will follow-up tomorrow for PT evaluation/ as appropriate.     Thank you.  Yaquelin Tellez, PT, DPT

## 2024-11-12 NOTE — PERIOP NOTE
1g vanc started at 0649  Metoprolol taken 11/12 @ 0400    Pts wedding band removed post induction. Placed in labeled specimin cup and hand delievered to Shanna Huber RN

## 2024-11-12 NOTE — INTERVAL H&P NOTE
Update History & Physical    The patient's History and Physical of November 6, 2024 was reviewed with the patient and I examined the patient. There was no change. The surgical site was confirmed by the patient and me.     Electronically signed by Osmel Hawkins PA-C on 11/12/2024 at 6:28 AM

## 2024-11-12 NOTE — BRIEF OP NOTE
BRIEF OP NOTE  Pre-Op Diagnosis: Disease of cardiovascular system [I25.10]    Post-Op Diagnosis: Disease of cardiovascular system [I25.10]      Procedure: Procedure(s):  CORONARY ARTERY BYPASS GRAFTING X 3 (LIMA-LAD, SVG-RCA, RADIAL-OM1), RESVGH, LEFT RADIAL ARTERY HARVEST, ECC, VANESSA AND EPIAORTIC U/S BY DR PAINTER    Surgeon:  Dr. Huyen MD    Assistant(s): Porfirio Kramer PA-C, Osmel Hawkins PA-C (radial artery harvest)    Anesthesia: General      Infusions:  Cardene, Fahad, Precedex, Insulin     Estimated Blood Loss: 200 ml     Cell Saver: 660 ml     Bypass Time: 88 min     Cross Clamp Time: 76 min    Specimens: * No specimens in log *    Drains and pacing wires: 2 atrial wires, 1 bipolar ventricular wire, 3 arik drains (2 Mediastinal, 1 L pleural)    Sternal incision closed by: Porfirio Kramer PA-C    Leg incision closed by:  Porfirio Kramer PA-C. Radial closure by Osmel Hawkins PA-C    Complications: None    Findings: See full operative note.    Implants: * No implants in log *

## 2024-11-12 NOTE — ANESTHESIA POSTPROCEDURE EVALUATION
Post-Anesthesia Evaluation and Assessment    Patient: Pedro Montes De Oca MRN: [id number]  SSN: [id number]    YOB: 1960  Age: 64 y.o.  Sex: male      I have evaluated the patient and they are stable and ready for discharge from the PACU.     Cardiovascular Function/Vital Signs  Visit Vitals  BP (!) 94/48   Pulse 70   Temp 98.6 °F (37 °C)   Resp 18   Ht 1.778 m (5' 10\")   Wt 95.7 kg (211 lb)   SpO2 100%   BMI 30.28 kg/m²       Patient is status post General anesthesia for Procedure(s):  CORONARY ARTERY BYPASS GRAFTING X 3, RESVGH, LEFT RADIAL ARTERY HARVEST, ECC, VANESSA AND EPIAORTIC U/S BY DR PAINTER.    Nausea/Vomiting: None    Postoperative hydration reviewed and adequate.    Pain:  Managed    Neurological Status:   At baseline    Mental Status, Level of Consciousness: Alert and  oriented to person, place, and time    Pulmonary Status:   Adequate oxygenation and airway patent    Complications related to anesthesia: None    Post-anesthesia assessment completed. No concerns    Signed By: Kj Painter MD     November 12, 2024

## 2024-11-13 ENCOUNTER — APPOINTMENT (OUTPATIENT)
Facility: HOSPITAL | Age: 64
End: 2024-11-13
Attending: THORACIC SURGERY (CARDIOTHORACIC VASCULAR SURGERY)
Payer: COMMERCIAL

## 2024-11-13 LAB
ACUTE KIDNEY INJURY RISK NEPHROCHECK: 0.13 (ref 0–0.3)
ANION GAP SERPL CALC-SCNC: 3 MMOL/L (ref 2–12)
BASE DEFICIT BLD-SCNC: 2.5 MMOL/L
BDY SITE: ABNORMAL
BUN SERPL-MCNC: 17 MG/DL (ref 6–20)
BUN/CREAT SERPL: 14 (ref 12–20)
CA-I BLD-SCNC: 1.13 MMOL/L (ref 1.12–1.32)
CALCIUM SERPL-MCNC: 7.9 MG/DL (ref 8.5–10.1)
CHLORIDE SERPL-SCNC: 115 MMOL/L (ref 97–108)
CO2 SERPL-SCNC: 25 MMOL/L (ref 21–32)
CREAT SERPL-MCNC: 1.21 MG/DL (ref 0.7–1.3)
EKG ATRIAL RATE: 62 BPM
EKG ATRIAL RATE: 65 BPM
EKG DIAGNOSIS: NORMAL
EKG DIAGNOSIS: NORMAL
EKG P AXIS: 51 DEGREES
EKG P AXIS: 54 DEGREES
EKG P-R INTERVAL: 174 MS
EKG P-R INTERVAL: 210 MS
EKG Q-T INTERVAL: 440 MS
EKG Q-T INTERVAL: 466 MS
EKG QRS DURATION: 118 MS
EKG QRS DURATION: 98 MS
EKG QTC CALCULATION (BAZETT): 457 MS
EKG QTC CALCULATION (BAZETT): 472 MS
EKG R AXIS: -5 DEGREES
EKG R AXIS: 17 DEGREES
EKG T AXIS: 18 DEGREES
EKG T AXIS: 22 DEGREES
EKG VENTRICULAR RATE: 62 BPM
EKG VENTRICULAR RATE: 65 BPM
ERYTHROCYTE [DISTWIDTH] IN BLOOD BY AUTOMATED COUNT: 13.3 % (ref 11.5–14.5)
GLUCOSE BLD STRIP.AUTO-MCNC: 101 MG/DL (ref 65–117)
GLUCOSE BLD STRIP.AUTO-MCNC: 106 MG/DL (ref 65–117)
GLUCOSE BLD STRIP.AUTO-MCNC: 108 MG/DL (ref 65–117)
GLUCOSE BLD STRIP.AUTO-MCNC: 108 MG/DL (ref 65–117)
GLUCOSE BLD STRIP.AUTO-MCNC: 109 MG/DL (ref 65–117)
GLUCOSE BLD STRIP.AUTO-MCNC: 110 MG/DL (ref 65–117)
GLUCOSE BLD STRIP.AUTO-MCNC: 112 MG/DL (ref 65–117)
GLUCOSE BLD STRIP.AUTO-MCNC: 114 MG/DL (ref 65–117)
GLUCOSE BLD STRIP.AUTO-MCNC: 115 MG/DL (ref 65–117)
GLUCOSE BLD STRIP.AUTO-MCNC: 119 MG/DL (ref 65–117)
GLUCOSE BLD STRIP.AUTO-MCNC: 120 MG/DL (ref 65–117)
GLUCOSE BLD STRIP.AUTO-MCNC: 120 MG/DL (ref 65–117)
GLUCOSE BLD STRIP.AUTO-MCNC: 126 MG/DL (ref 65–117)
GLUCOSE BLD STRIP.AUTO-MCNC: 128 MG/DL (ref 65–117)
GLUCOSE BLD STRIP.AUTO-MCNC: 129 MG/DL (ref 65–117)
GLUCOSE BLD STRIP.AUTO-MCNC: 133 MG/DL (ref 65–117)
GLUCOSE BLD STRIP.AUTO-MCNC: 153 MG/DL (ref 65–117)
GLUCOSE BLD STRIP.AUTO-MCNC: 166 MG/DL (ref 65–117)
GLUCOSE BLD STRIP.AUTO-MCNC: 85 MG/DL (ref 65–117)
GLUCOSE SERPL-MCNC: 108 MG/DL (ref 65–100)
HCO3 BLD-SCNC: 23.2 MMOL/L (ref 21–28)
HCT VFR BLD AUTO: 27.7 % (ref 36.6–50.3)
HGB BLD-MCNC: 11.3 G/DL (ref 12.1–17)
HGB BLD-MCNC: 9 G/DL (ref 12.1–17)
HGB BLD-MCNC: 9 G/DL (ref 12.1–17)
HGB BLD-MCNC: 9.3 G/DL (ref 12.1–17)
HGB BLD-MCNC: 9.6 G/DL (ref 12.1–17)
HGB BLD-MCNC: 9.7 G/DL (ref 12.1–17)
MAGNESIUM SERPL-MCNC: 2.2 MG/DL (ref 1.6–2.4)
MCH RBC QN AUTO: 30.5 PG (ref 26–34)
MCHC RBC AUTO-ENTMCNC: 33.6 G/DL (ref 30–36.5)
MCV RBC AUTO: 90.8 FL (ref 80–99)
NRBC # BLD: 0 K/UL (ref 0–0.01)
NRBC BLD-RTO: 0 PER 100 WBC
PCO2 BLD: 43.4 MMHG (ref 35–48)
PH BLD: 7.34 (ref 7.35–7.45)
PLATELET # BLD AUTO: 175 K/UL (ref 150–400)
PMV BLD AUTO: 10 FL (ref 8.9–12.9)
PO2 BLD: 85 MMHG (ref 83–108)
POTASSIUM SERPL-SCNC: 4.2 MMOL/L (ref 3.5–5.1)
RBC # BLD AUTO: 3.05 M/UL (ref 4.1–5.7)
SAO2 % BLD: 95.6 % (ref 92–97)
SERVICE CMNT-IMP: ABNORMAL
SERVICE CMNT-IMP: NORMAL
SODIUM SERPL-SCNC: 143 MMOL/L (ref 136–145)
SPECIMEN TYPE: ABNORMAL
WBC # BLD AUTO: 15.3 K/UL (ref 4.1–11.1)

## 2024-11-13 PROCEDURE — 6360000002 HC RX W HCPCS

## 2024-11-13 PROCEDURE — 83735 ASSAY OF MAGNESIUM: CPT

## 2024-11-13 PROCEDURE — 6370000000 HC RX 637 (ALT 250 FOR IP)

## 2024-11-13 PROCEDURE — 82962 GLUCOSE BLOOD TEST: CPT

## 2024-11-13 PROCEDURE — 2500000003 HC RX 250 WO HCPCS

## 2024-11-13 PROCEDURE — 2580000003 HC RX 258: Performed by: STUDENT IN AN ORGANIZED HEALTH CARE EDUCATION/TRAINING PROGRAM

## 2024-11-13 PROCEDURE — 94760 N-INVAS EAR/PLS OXIMETRY 1: CPT

## 2024-11-13 PROCEDURE — 82803 BLOOD GASES ANY COMBINATION: CPT

## 2024-11-13 PROCEDURE — 97165 OT EVAL LOW COMPLEX 30 MIN: CPT

## 2024-11-13 PROCEDURE — 93005 ELECTROCARDIOGRAM TRACING: CPT

## 2024-11-13 PROCEDURE — 97116 GAIT TRAINING THERAPY: CPT

## 2024-11-13 PROCEDURE — P9045 ALBUMIN (HUMAN), 5%, 250 ML: HCPCS

## 2024-11-13 PROCEDURE — 71045 X-RAY EXAM CHEST 1 VIEW: CPT

## 2024-11-13 PROCEDURE — 85027 COMPLETE CBC AUTOMATED: CPT

## 2024-11-13 PROCEDURE — 51798 US URINE CAPACITY MEASURE: CPT

## 2024-11-13 PROCEDURE — 93010 ELECTROCARDIOGRAM REPORT: CPT | Performed by: INTERNAL MEDICINE

## 2024-11-13 PROCEDURE — 2700000000 HC OXYGEN THERAPY PER DAY

## 2024-11-13 PROCEDURE — 2580000003 HC RX 258: Performed by: NURSE PRACTITIONER

## 2024-11-13 PROCEDURE — 6370000000 HC RX 637 (ALT 250 FOR IP): Performed by: NURSE PRACTITIONER

## 2024-11-13 PROCEDURE — 2580000003 HC RX 258

## 2024-11-13 PROCEDURE — 6360000002 HC RX W HCPCS: Performed by: ANESTHESIOLOGY

## 2024-11-13 PROCEDURE — 97162 PT EVAL MOD COMPLEX 30 MIN: CPT

## 2024-11-13 PROCEDURE — 97530 THERAPEUTIC ACTIVITIES: CPT

## 2024-11-13 PROCEDURE — 2000000000 HC ICU R&B

## 2024-11-13 PROCEDURE — 80048 BASIC METABOLIC PNL TOTAL CA: CPT

## 2024-11-13 PROCEDURE — 36415 COLL VENOUS BLD VENIPUNCTURE: CPT

## 2024-11-13 RX ORDER — AMLODIPINE BESYLATE 5 MG/1
5 TABLET ORAL DAILY
Status: DISCONTINUED | OUTPATIENT
Start: 2024-11-13 | End: 2024-11-14

## 2024-11-13 RX ORDER — ALBUMIN HUMAN 50 G/1000ML
12.5 SOLUTION INTRAVENOUS ONCE
Status: COMPLETED | OUTPATIENT
Start: 2024-11-13 | End: 2024-11-13

## 2024-11-13 RX ORDER — ALBUMIN HUMAN 50 G/1000ML
SOLUTION INTRAVENOUS
Status: COMPLETED
Start: 2024-11-13 | End: 2024-11-13

## 2024-11-13 RX ORDER — HYDROMORPHONE HYDROCHLORIDE 2 MG/1
2 TABLET ORAL
Status: DISCONTINUED | OUTPATIENT
Start: 2024-11-13 | End: 2024-11-15 | Stop reason: HOSPADM

## 2024-11-13 RX ORDER — SODIUM CHLORIDE 450 MG/100ML
INJECTION, SOLUTION INTRAVENOUS CONTINUOUS
Status: DISCONTINUED | OUTPATIENT
Start: 2024-11-13 | End: 2024-11-15 | Stop reason: HOSPADM

## 2024-11-13 RX ADMIN — MAGNESIUM OXIDE TAB 400 MG (241.3 MG ELEMENTAL MG) 400 MG: 400 (241.3 MG) TAB at 20:38

## 2024-11-13 RX ADMIN — ALBUMIN HUMAN 12.5 G: 50 SOLUTION INTRAVENOUS at 08:08

## 2024-11-13 RX ADMIN — PHENYLEPHRINE HYDROCHLORIDE 40 MCG/MIN: 10 INJECTION INTRAVENOUS at 03:10

## 2024-11-13 RX ADMIN — SODIUM CHLORIDE, POTASSIUM CHLORIDE, SODIUM LACTATE AND CALCIUM CHLORIDE: 600; 310; 30; 20 INJECTION, SOLUTION INTRAVENOUS at 06:50

## 2024-11-13 RX ADMIN — SODIUM CHLORIDE 3.1 UNITS/HR: 9 INJECTION, SOLUTION INTRAVENOUS at 07:00

## 2024-11-13 RX ADMIN — FAMOTIDINE 20 MG: 10 INJECTION, SOLUTION INTRAVENOUS at 09:16

## 2024-11-13 RX ADMIN — AMIODARONE HYDROCHLORIDE 400 MG: 200 TABLET ORAL at 20:38

## 2024-11-13 RX ADMIN — SENNOSIDES AND DOCUSATE SODIUM 1 TABLET: 50; 8.6 TABLET ORAL at 20:38

## 2024-11-13 RX ADMIN — ALBUMIN (HUMAN) 12.5 G: 12.5 INJECTION, SOLUTION INTRAVENOUS at 08:08

## 2024-11-13 RX ADMIN — HYDROMORPHONE HYDROCHLORIDE 0.5 MG: 1 INJECTION, SOLUTION INTRAMUSCULAR; INTRAVENOUS; SUBCUTANEOUS at 02:08

## 2024-11-13 RX ADMIN — GABAPENTIN 200 MG: 100 CAPSULE ORAL at 09:45

## 2024-11-13 RX ADMIN — PROCHLORPERAZINE EDISYLATE 10 MG: 5 INJECTION INTRAMUSCULAR; INTRAVENOUS at 00:05

## 2024-11-13 RX ADMIN — ACETAMINOPHEN 1000 MG: 500 TABLET ORAL at 12:51

## 2024-11-13 RX ADMIN — HYDROMORPHONE HYDROCHLORIDE 0.5 MG: 1 INJECTION, SOLUTION INTRAMUSCULAR; INTRAVENOUS; SUBCUTANEOUS at 08:56

## 2024-11-13 RX ADMIN — NICARDIPINE HYDROCHLORIDE 1 MG/HR: 0.1 INJECTION, SOLUTION INTRAVENOUS at 06:53

## 2024-11-13 RX ADMIN — HYDROMORPHONE HYDROCHLORIDE 2 MG: 2 TABLET ORAL at 21:16

## 2024-11-13 RX ADMIN — PHENYLEPHRINE HYDROCHLORIDE 30 MCG/MIN: 10 INJECTION INTRAVENOUS at 06:54

## 2024-11-13 RX ADMIN — ONDANSETRON 4 MG: 2 INJECTION INTRAMUSCULAR; INTRAVENOUS at 08:07

## 2024-11-13 RX ADMIN — CHLORHEXIDINE GLUCONATE 15 ML: 1.2 RINSE ORAL at 20:44

## 2024-11-13 RX ADMIN — AMLODIPINE BESYLATE 5 MG: 5 TABLET ORAL at 09:45

## 2024-11-13 RX ADMIN — SODIUM CHLORIDE, PRESERVATIVE FREE 10 ML: 5 INJECTION INTRAVENOUS at 09:18

## 2024-11-13 RX ADMIN — NICARDIPINE HYDROCHLORIDE 1 MG/HR: 0.1 INJECTION, SOLUTION INTRAVENOUS at 09:27

## 2024-11-13 RX ADMIN — ACETAMINOPHEN 1000 MG: 500 TABLET ORAL at 18:16

## 2024-11-13 RX ADMIN — ASPIRIN 81 MG: 81 TABLET, COATED ORAL at 09:45

## 2024-11-13 RX ADMIN — HYDROMORPHONE HYDROCHLORIDE 2 MG: 2 TABLET ORAL at 18:16

## 2024-11-13 RX ADMIN — GABAPENTIN 200 MG: 100 CAPSULE ORAL at 20:38

## 2024-11-13 RX ADMIN — ATORVASTATIN CALCIUM 80 MG: 40 TABLET, FILM COATED ORAL at 20:38

## 2024-11-13 RX ADMIN — CHLORHEXIDINE GLUCONATE 15 ML: 1.2 RINSE ORAL at 09:17

## 2024-11-13 RX ADMIN — HYDROMORPHONE HYDROCHLORIDE 2 MG: 2 TABLET ORAL at 13:49

## 2024-11-13 RX ADMIN — SODIUM CHLORIDE, PRESERVATIVE FREE 10 ML: 5 INJECTION INTRAVENOUS at 20:44

## 2024-11-13 RX ADMIN — SODIUM CHLORIDE: 4.5 INJECTION, SOLUTION INTRAVENOUS at 13:11

## 2024-11-13 RX ADMIN — FAMOTIDINE 20 MG: 20 TABLET, FILM COATED ORAL at 20:38

## 2024-11-13 RX ADMIN — MUPIROCIN: 20 OINTMENT TOPICAL at 20:44

## 2024-11-13 RX ADMIN — GABAPENTIN 200 MG: 100 CAPSULE ORAL at 13:54

## 2024-11-13 RX ADMIN — HYDROMORPHONE HYDROCHLORIDE 0.5 MG: 1 INJECTION, SOLUTION INTRAMUSCULAR; INTRAVENOUS; SUBCUTANEOUS at 04:38

## 2024-11-13 RX ADMIN — SODIUM CHLORIDE 1500 MG: 9 INJECTION, SOLUTION INTRAVENOUS at 07:06

## 2024-11-13 RX ADMIN — MUPIROCIN: 20 OINTMENT TOPICAL at 09:17

## 2024-11-13 RX ADMIN — AMIODARONE HYDROCHLORIDE 400 MG: 200 TABLET ORAL at 10:56

## 2024-11-13 RX ADMIN — MAGNESIUM OXIDE TAB 400 MG (241.3 MG ELEMENTAL MG) 400 MG: 400 (241.3 MG) TAB at 10:56

## 2024-11-13 RX ADMIN — SENNOSIDES AND DOCUSATE SODIUM 1 TABLET: 50; 8.6 TABLET ORAL at 10:56

## 2024-11-13 RX ADMIN — ONDANSETRON 4 MG: 2 INJECTION INTRAMUSCULAR; INTRAVENOUS at 12:51

## 2024-11-13 ASSESSMENT — PAIN DESCRIPTION - LOCATION
LOCATION: CHEST

## 2024-11-13 ASSESSMENT — PAIN DESCRIPTION - DESCRIPTORS
DESCRIPTORS: ACHING

## 2024-11-13 ASSESSMENT — PAIN SCALES - GENERAL
PAINLEVEL_OUTOF10: 2
PAINLEVEL_OUTOF10: 5
PAINLEVEL_OUTOF10: 7
PAINLEVEL_OUTOF10: 2
PAINLEVEL_OUTOF10: 5
PAINLEVEL_OUTOF10: 7
PAINLEVEL_OUTOF10: 9

## 2024-11-13 ASSESSMENT — PAIN DESCRIPTION - FREQUENCY
FREQUENCY: CONTINUOUS

## 2024-11-13 ASSESSMENT — PAIN DESCRIPTION - PAIN TYPE
TYPE: SURGICAL PAIN

## 2024-11-13 ASSESSMENT — PAIN DESCRIPTION - ORIENTATION
ORIENTATION: ANTERIOR

## 2024-11-13 NOTE — PROGRESS NOTES
CRITICAL CARE  NOTE      Name: Pedro Montes De Oca   : 1960   MRN: [id number]   Date: 2024      Reason for ICU Admission: s/p CABG    ICU PROBLEM LIST   Multivessel CAD s/p CABG x3, prior PCI  HTN  TIA    HISTORY OF PRESENT ILLNESS:   Pt is a 65yo M w/ PMH as noted above who presents to CVICU post planned CABG x3 (LIMA-LAD, SVG-RCA, Radial-OM1). Uncomplicated intra op course.    Pt arrived to CVICU in DDD pacing, intubated, sedated on precedex, phenylephrine, cardene, insulin gtt    24 HOUR EVENTS:   Pt extubated POD 0, no acute events o/n. Sitting in bedside chair, NAD. Remains on cardene ppx and low dose scotty. Back in NSR    CCM to follow peripherally given pt following expected post op course     NEUROLOGICAL:    Monitor mentation   As needed pain regimen     PULMONOLOGY:   O2 per NC for spO2 92-98%  CPAP qHS  As needed nebs  IS, PFV, out of bed to chair as tolerated      CARDIOVASCULAR:   Wean phenylephrine as tolerated   Cardene for vasospasm ppx, transition to PO CCB  Goal MAP greater than 65, SBP less than 130, CI >2, CO >3.5  Pacer wires and Francis drain management per CT surgery   Telemetry     GASTROINTESTINAL:   Pepcid  ADAT      RENAL/ELECTROLYTE/FLUIDS:   Strict ins and outs  Daily renal panel  Monitor and replace electrolytes     ENDOCRINE:   Insulin drip per protocol  Glucose goal 120-180     HEMATOLOGY/ONCOLOGY:   SCDs  Chemical prophylaxis as cleared by surgery     ID/MICRO:      Perioperative vanc ( ampicillin allergy)     ICU DAILY CHECKLIST      Code Status: Full  DVT Prophylaxis: SCDs  T/L/D: CVC, A-line, Francis x3, Mcconnell   SUP: Pepcid  Diet: ADA T  Activity Level: Ad jane.  ABCDEF Bundle/Checklist Completed:Yes  Disposition: Stay in ICU  Multidisciplinary Rounds Completed:  Yes  Patient/Family Updated: Yes       Review of Systems:     Review of Systems  Negative except as noted above    OBJECTIVE:     Labs and Data: Reviewed 24  Medications: Reviewed 24  Imaging: Reviewed  discussed the case and the plan and management of the patient's care with the consulting services, the bedside nurses and the respiratory therapist.      NOTE OF PERSONAL INVOLVEMENT IN CARE   This patient has a high probability of imminent, clinically significant deterioration, which requires the highest level of preparedness to intervene urgently. I participated in the decision-making and personally managed or directed the management of the following life and organ supporting interventions that required my frequent assessment to treat or prevent imminent deterioration.    I personally spent 35 minutes of critical care time.  This is time spent at this critically ill patient's bedside actively involved in patient care as well as the coordination of care.  This does not include any procedural time which has been billed separately.    Jayson Abbasi MD  Staff Intensivist  [facility]

## 2024-11-13 NOTE — PROGRESS NOTES
Spiritual Health History and Assessment/Progress Note  [facility]    Initial Encounter,  , Life Adjustments, Adjustment to illness,      Name: Pedro Montes De Oca MRN: [id number]    Age: 64 y.o.     Sex: male   Language: English   Anabaptism: Latter-day   Disease of cardiovascular system     Date: 11/13/2024            Total Time Calculated: 15 min              Spiritual Assessment began in [facility] 4 CV INTNSV CARE        Referral/Consult From: Rounding   Encounter Overview/Reason: Initial Encounter  Service Provided For: Family    Ana, Belief, Meaning:   Patient unable to assess at this time  Family/Friends are connected with a ana tradition or spiritual practice and have beliefs or practices that help with coping during difficult times      Importance and Influence:  Patient unable to assess at this time  Family/Friends have spiritual/personal beliefs that influence decisions regarding the patient's health    Community:  Patient Other: unable to assess at this time  Family/Friends feel well-supported. Support system includes: Children, Ana Community, and Extended family    Assessment and Plan of Care:   Assessment conducted with spouse Nicole at bedside. She talked about Pedro's recent surgery  yesterday and her concerns for his number levels that need to stabilized. Both Nicole and Pedro are Latter-day. Intro spiritual health.     Patient Interventions include: Other: unable to assess at this time  Family/Friends Interventions include: Explored spiritual coping/struggle/distress and Affirmed coping skills/support systems    Patient Plan of Care: Other: unable to assess at this time  Family/Friends Plan of Care: Spiritual Care available upon further referral    Electronically signed by REV. RUBEN FIGUEROA M.Div, AUBREY on 11/13/2024 at 12:48 PM

## 2024-11-13 NOTE — PROGRESS NOTES
2000: Bedside and Verbal shift change report given to CHELLY Shotr (oncoming nurse) by CHELLY Lee (offgoing nurse). Report included the following information Nurse Handoff Report, Index, Surgery Report, Intake/Output, MAR, Recent Results, and Cardiac Rhythm atrial paced .     2010: Administered 10 mg of oxycodone PO to patient. Within seconds, patient had an episode of vomiting. Patient had previously been given zofran, but stated it did not help. Updated Intensivist and asked for another antiemetic. Received orders to get post surgery EKG to obtain Qtc.     2045: 12-lead EKG performed. Qtc - 472. Updated Intensivist. Orders placed for 10 mg compazine prn.     2130: Dr. Boykin at bedside for patient update/rounds. Discussed patient's intermittent nausea/vomiting, as well as patient's fluid status. No new orders at this time. Also discussed patient's ABG results prior and post extubation. Verbal orders received to obtain another ABG at midnight.     0005: Patient complaining of nausea. Compazine given. Immediately after administration, patient MAP dropped to low 50s. Increased Fahad gtt to 60 mcg/min to maintain MAP goal > 65. Within a few minutes, patient MAP back in 70s.     0010: ABG results: pH: 7.34 / pCO2: 43.4 / pO2: 85 / HCO3: 23.2 / O2 sat: 95.6%. Dr. Boykin at bedside. No new orders regarding ABG results. Discussed patient's drop in pressure after compazine administration. Patient atrially paced at 70 bpm. Orders to increase to 80 for BP support. SBP: 125-130, MAP: 70-74.     0030: SUMIT - 0.13. No interventions required at this time.     0800: Bedside and Verbal shift change report given to CHELLY Salvador (oncoming nurse) by CHELLY Short (offgoing nurse). Report included the following information Nurse Handoff Report, Index, Surgery Report, Intake/Output, MAR, Recent Results, and Cardiac Rhythm NSR .

## 2024-11-13 NOTE — PROGRESS NOTES
Spiritual Health History and Assessment/Progress Note  [facility]    Rituals, Rites and Sacraments,  , Life Adjustments, Adjustment to illness,      Name: Pedro Montes De Oca MRN: [id number]    Age: 64 y.o.     Sex: male   Language: English   Taoist: Presybeterian   Disease of cardiovascular system     Date: 11/13/2024            Total Time Calculated: 5 min              Spiritual Assessment began in [facility] 4 CV INTNSV CARE        Referral/Consult From: Clergy/   Encounter Overview/Reason: Rituals, Rites and Sacraments  Service Provided For: Patient    Ana, Belief, Meaning:   Patient is connected with a ana tradition or spiritual practice  Family/Friends are connected with a ana tradition or spiritual practice      Importance and Influence:  Patient has spiritual/personal beliefs that influence decisions regarding their health  Family/Friends have spiritual/personal beliefs that influence decisions regarding the patient's health    Community:  Patient is connected with a spiritual community  Family/Friends are connected with a spiritual community:    Assessment and Plan of Care:     Patient Interventions include: Provided sacramental/Anabaptist ritual  Family/Friends Interventions include: Provided sacramental/Anabaptist ritual    Patient Plan of Care: Spiritual Care available upon further referral  Family/Friends Plan of Care: Spiritual Care available upon further referral    Electronically signed by Chaplain LUPE on 11/13/2024 at 4:12 PM     [facility]  Mr. Montes De Oca was sitting up in his chair. His wife was with him.  They are Presybeterian. Mr. Montes De Oca is not on a diet yet so spiritual communion prayer offered for him and his wife received commundylon,       Sr. SHAMIKA York, RN, ACSW, LCSW   Page:  [phone]

## 2024-11-13 NOTE — PLAN OF CARE
Problem: Occupational Therapy - Adult  Goal: By Discharge: Performs self-care activities at highest level of function for planned discharge setting.  See evaluation for individualized goals.  Description: FUNCTIONAL STATUS PRIOR TO ADMISSION:  Patient active and independent without DME/AE. Works full time as a .     HOME SUPPORT: Patient lived with wife but didn't require assistance.    Occupational Therapy Goals:  Initiated 11/13/2024    1.  Patient will perform ADLs standing 5 mins without fatigue or LOB with Supervision within 7 days.  2.  Patient will perform upper body ADLs with Contact Guard Assist within 7 days.  3.  Patient will perform lower body ADLs with Contact Guard Assist within 7 days.    4.  Patient will perform gathering ADL items high and low 2/2 with Contact Guard Assist within 7 days.  5.  Patient will perform toilet transfers with Contact Guard Assist within 7 days.  6.  Patient will perform all aspects of toileting with Contact Guard Assist within 7 days.  7.  Patient will participate in cardiac/sternal upper extremity therapeutic exercise/activities to increase independence with ADLs with supervision/set-up for 5 minutes within 7 days.   8.  Patient will adhere to Move in the Tube precautions during functional tasks with verbal cues within 7 days.    Outcome: Progressing   OCCUPATIONAL THERAPY EVALUATION    Patient: Pedro Montes De Oca (64 y.o. male)  Date: 11/13/2024  Primary Diagnosis: Disease of cardiovascular system [I25.10]  CAD in native artery [I25.10]  Procedure(s) (LRB):  CORONARY ARTERY BYPASS GRAFTING X 3, RESVGH, LEFT RADIAL ARTERY HARVEST, ECC, VANESSA AND EPIAORTIC U/S BY DR PAINTER (N/A) 1 Day Post-Op     Precautions: Fall Risk (Move in the Tube)                    ASSESSMENT :  The patient is limited by decreased functional mobility, independence in ADLs, high-level IADLs, ROM, strength, sensation, body mechanics, activity tolerance, balance, posture, increased pain  functional and  no verbal  or physical assist needed to complete eval tasks   Based on the above components, the patient evaluation is determined to be of the following complexity level: Low

## 2024-11-13 NOTE — CARE COORDINATION
Care Management Initial Assessment       RUR: 11% - low  Readmission? No  1st IM letter given? No  1st  letter given: No    CM spoke with patient's wife at the bedside while patient was resting during initial assessment. Wife confirmed address on file is correct and insurance is correct. Patient is independent at baseline without DME. He is fully employed. [facility] for prescriptions. Reviewed post surgical protocol for discharge planning and patient's wife is agreeable for [facility] to see patient at home for  services. Will send referral once order is in for patient.    CM will continue to follow.       11/13/24 1122   Service Assessment   Patient Orientation Alert and Oriented   Cognition Alert   History Provided By Spouse   Primary Caregiver Self   Accompanied By/Relationship spouse   Support Systems Spouse/Significant Other   Patient's Healthcare Decision Maker is: Legal Next of Kin   PCP Verified by CM Yes   Last Visit to PCP Within last 3 months   Prior Functional Level Independent in ADLs/IADLs   Current Functional Level Independent in ADLs/IADLs   Can patient return to prior living arrangement Yes   Ability to make needs known: Good   Family able to assist with home care needs: Yes   Would you like for me to discuss the discharge plan with any other family members/significant others, and if so, who? Yes  (spouse)   Financial Resources Other (Comment)  (BCBS)   Community Resources None   Social/Functional History   Lives With Spouse   Type of Home House   Home Equipment None   Receives Help From Family   ADL Assistance Independent   Homemaking Assistance Independent   Homemaking Responsibilities No   Ambulation Assistance Independent   Transfer Assistance Independent   Active  Yes   Mode of Transportation Car   Occupation Full time employment   Discharge Planning   Type of Residence House   Living Arrangements Spouse/Significant Other   Current Services Prior

## 2024-11-13 NOTE — CONSULTS
[facility]  DIABETES MANAGEMENT CONSULT    Consulted by Provider for advanced nursing evaluation and care for inpatient blood glucose management.    Evaluation and Action Plan   Pedro Montes De Oca is a 64 y.o. male s/p CABG x3 11/12 for multivessel CAD.  Post op course progressing.  Noted pre-op A1C 6.5% which indicates diabetes.  Will discuss this with him at a more appropriate time prior to discharge.  Post op on insulin infusion and will transition off after 48h. Hourly needs past 24h 2-4 units/hr.        Blood glucose pattern    Significant diabetes-related events over the past 24-72 hours  Insulin infusion, past 24h needs: 47.6 units      Management Rationale Action Plan   Medication  Plan to transition off 11/14 after lunch time - will provide recs    Additional orders  Carb consistent diet (60g CHO/meal)       Diabetes Discharge Plan   Medication  Plan to  patient on diet /exercise adjustments   Referral  [x]        Outpatient diabetes education-would benefit   Additional orders              HX:   Past Medical History:   Diagnosis Date    Coronary artery disease involving native coronary artery 10/2/2017    HTN (hypertension)     Sleep apnea     uses cpap        INITIAL DX: Disease of cardiovascular system [I25.10]  CAD in native artery [I25.10]     Current Treatment     TX: cardiac surgery post op care    Diabetes History   Type Diabetes-new onset this admission per pre op A1C  Ambulatory BG management provided by: Travis Montez MD   Family History:    Lab Results   Component Value Date    LABA1C 6.5 (H) 11/06/2024     Diabetes-related Medical History  Neurological complications  NONE  Macrovascular disease  CAD and myocardial infarction with prior stent  Other associated conditions     Obesity/HLD/ HTN/TIA with CPAP    Diabetes Medication History: NONE PTA    Diabetes self-management practices: deferred today, PT/OT working with patient at time of visit.  Eating  pattern   [] Eating a carbohydrate-controlled meal plan  [] Breakfast    [] Lunch     [] Dinner       [] Snacks    [] Beverages    [] Dentition status   Physical activity pattern   [] Employing a physical activity program to control BG    Social determinants of health impacting diabetes self-management practices    Concerned that you need to know more about how to stay healthy with diabetes      Subjective   Deferred; PT /OT working with patient      Objective   Physical exam  General Overweight male in no acute distress. Conversant and cooperative  Neuro  Not assessed  Vital Signs   Vitals:    11/13/24 1000   BP:    Pulse: 66   Resp: 12   Temp:    SpO2: 94%         Laboratory  Recent Labs     11/12/24  1207 11/12/24  1744 11/13/24  0307   WBC 16.0* 20.2* 15.3*   HGB 11.1* 10.2* 9.3*   HCT 32.7* 30.0* 27.7*   MCV 90.6 90.6 90.8    168 175     Recent Labs     11/12/24  1207 11/12/24  1744 11/13/24  0307    145 143   K 4.5 3.8 4.2   * 115* 115*   CO2 26 22 25   BUN 16 17 17   CREATININE 1.46* 1.48* 1.21     Lab Results   Component Value Date    ALT 33 11/12/2024    AST 29 11/12/2024    ALKPHOS 93 11/12/2024    BILITOT 0.9 11/12/2024     Lab Results   Component Value Date    TSH 3.19 11/06/2024         Factors impacting BG management  Factor Dose Comments   Nutrition:  Standard meals   60 grams/meal    Drugs:  Vasopressor load     On/off weaning   Affects insulin delivery     Pain prn    Infection NA    Kidney function ?SUMIT    Liver function WNL          Assessment and Nursing Intervention   Nursing Diagnosis Risk for unstable blood glucose pattern   Nursing Intervention Domain 9348 Decision-making Support   Nursing Interventions Examined current inpatient diabetes/blood glucose control   Explored factors facilitating and impeding inpatient management  Explored corrective strategies with patient and responsible inpatient provider   Informed patient of rational for insulin strategy while hospitalized

## 2024-11-13 NOTE — PROGRESS NOTES
Cardiac Surgery Care Coordinator-  Met with Pedro Montes De Oca. Reviewed plan of care and discussed goals for the day. Pedro Montes De Oca has a fair understanding of his plan for the day.  Reinforced sternal precautions and encouraged continued use of the incentive spirometer.      1200- Met with Pedro Montes De Oca and his wife, reviewed plan of care and encouraged them to verbalize. Will continue to follow.

## 2024-11-13 NOTE — PLAN OF CARE
Problem: Physical Therapy - Adult  Goal: By Discharge: Performs mobility at highest level of function for planned discharge setting.  See evaluation for individualized goals.  Description: FUNCTIONAL STATUS PRIOR TO ADMISSION: Patient was independent and active without use of DME. Patient works full time as a . Active in the community.    HOME SUPPORT PRIOR TO ADMISSION: The patient lived with wife but did not require assistance.    Physical Therapy Goals  Initiated 11/13/2024  1.  Patient will move from supine to sit and sit to supine, scoot up and down, and roll side to side in bed with independence within 5 day(s).    2.  Patient will perform sit to stand with independence within 5 day(s).  3.  Patient will transfer from bed to chair and chair to bed with independence using the least restrictive device within 5 day(s).  4.  Patient will ambulate with independence for 100 feet with the least restrictive device within 5 day(s).   5.  Patient will ascend/descend 6 stairs with one handrail(s) with supervision/set-up within 5 day(s).  6.  Patient will perform cardiac exercises per protocol with supervision/set-up within 5 days.  7.  Patient will verbally recall and functionally demonstrate mindful-based movements (\"move in the tube\") principles without cues within 5 days.   Outcome: Progressing     PHYSICAL THERAPY EVALUATION    Patient: Pedro Montes De Oca (64 y.o. male)  Date: 11/13/2024  Primary Diagnosis: Disease of cardiovascular system [I25.10]  CAD in native artery [I25.10]  Procedure(s) (LRB):  CORONARY ARTERY BYPASS GRAFTING X 3, RESVGH, LEFT RADIAL ARTERY HARVEST, ECC, VANESSA AND EPIAORTIC U/S BY DR PAINTER (N/A) 1 Day Post-Op   Precautions: Restrictions/Precautions: Fall Risk (Move in the Tube)                      ASSESSMENT :   DEFICITS/IMPAIRMENTS:   The patient is limited by decreased functional mobility, strength, activity tolerance, attention/concentration, balance, orthostatic hypotension,            Trunk rotation  1  5  [x]                             []                             []                             []                                Trunk sidebending  1  5  [x]                             []                              []                             []                                                                                                                                                                                                                                                                                                                                                                                                                                                                                                      [facility] AM-PAC®      Basic Mobility Inpatient Short Form (6-Clicks) Version 2    How much help is needed turning from your back to your side while in a flat bed without using bedrails?: A Lot  How much help is needed moving from lying on your back to sitting on the side of a flat bed without using bedrails?: A Lot  How much help is needed moving to and from a bed to a chair?: A Little  How much help is needed standing up from a chair using your arms?: A Little  How much help is needed walking in hospital room?: A Little  How much help is needed climbing 3-5 steps with a railing?: A Lot    AM-PAC Inpatient Mobility Raw Score : 15  -PAC Inpatient T-Scale Score : 39.45     Cutoff score <=171,2,3 had higher odds of discharging home with home health or need of SNF/IPR.    1. Brandi Wagner, Lidya Oshea, Lavon Aj, Estefani Callahan, Alexandro Chavez, Alan Wagner.  Validity of the -PAC “6-Clicks” Inpatient Daily Activity and Basic Mobility Short Forms. Physical Therapy Mar 2014, [phone]; DOI: 10.2522/ptj.22723133  2. Jarvis Aguirre, Sherman JOHNSON, Harman JOHNSON. Association of -PAC \"6-Clicks\" Basic Mobility and Daily Activity  Scores With Discharge Destination. Phys Ther. 2021 Apr 4;101(4):emoj613. doi: 10.1093/ptj/ptpa933. PMID: 33792656.  3. Carmen JOHNSON, Ainsley RODRIGUEZ, Miya S, Sybil HARRIS, Sadia CANCINO. Activity Measure for Post-Acute Care \"6-Clicks\" Basic Mobility Scores Predict Discharge Destination After Acute Care Hospitalization in Select Patient Groups: A Retrospective, Observational Study. Arch Rehabil Res Clin Transl. 2022 Jul 16;4(3):684071. doi: 10.1016/j.arrct.2022.670043. PMID: 48643916; PMCID: [id number].  4. Kristy REINOSO, Lia S, Fatemeh W, Latasha P. AM-PAC Short Forms Manual 4.0. Revised 2/2020.                                                                                                                                                                                                                               Pain Rating:  Reports sternal pain, does not rate  Pain Intervention(s):   patient medicated for pain prior to session and repositioning    Activity Tolerance:   Fair , requires frequent rest breaks, desaturates with activity and requires oxygen, and signs and symptoms of orthostatic hypotension    After treatment:   Patient left in no apparent distress sitting up in chair, Call bell within reach, Bed/ chair alarm activated, and Caregiver / family present    COMMUNICATION/EDUCATION:   The patient's plan of care was discussed with: occupational therapist and registered nurse    Patient Education  Education Given To: Patient;Family  Education Provided: Role of Therapy;Plan of Care;Home Exercise Program;Energy Conservation  Education Method: Verbal  Education Outcome: Continued education needed    Thank you for this referral.  Yen Betts PT, DPT  Minutes: 38      Physical Therapy Evaluation Charge Determination   History Examination Presentation Decision-Making   MEDIUM  Complexity : 1-2 comorbidities / personal factors will impact the outcome/ POC  LOW Complexity : 1-2 Standardized tests and measures addressing body structure,

## 2024-11-13 NOTE — PROGRESS NOTES
Cardiac Surgery ICU Progress Note    Admit Date: 2024  POD:  1 Day Post-Op    Procedure:  Procedure(s):  CORONARY ARTERY BYPASS GRAFTING X 3, RESVGH, LEFT RADIAL ARTERY HARVEST, ECC, VANESSA AND EPIAORTIC U/S BY DR PAINTER       [facility] course:  DOS: elective CABG X3 with left radial artery conduit harvest. To ICU on nicardipine, Precedex, neosynephrine and insulin infusions. Extubated on time. N&V. BP fell with compazine.  cc.   POD #1: 11-:  Fahad 30. Start Norvasc. Insulin gtt. Albumin X1.    Subjective:   Patient seen with Dr. Lombardi  Apaced overnight.  SR this am.   Infusions:  Cardene, insulin, neosynephrine at 30 mc  4LNC    Pt c/o N&V last night.  cc overnight.  C/O pain.     Objective:   Vitals:  Blood pressure 117/62, pulse 80, temperature 99 °F (37.2 °C), resp. rate 13, height 1.778 m (5' 10\"), weight 95.7 kg (211 lb), SpO2 97%.  Temp (24hrs), Av.4 °F (36.9 °C), Min:97 °F (36.1 °C), Max:99.1 °F (37.3 °C)    Ventilator Settings:      Mode Rate TV Press PEEP FiO2 PIP Min. Vent   SIMV/VC 16 bpm  500 mL    5 50 %  17 cmH2O           Oxygen Flow Rate: O2 Flow Rate (L/min): 4 L/min    Oxygen Delivery Method: O2 Device: Nasal cannula      [place]-Mus[place]aq  [place]-Mus[place]aq  CVP (Mean): 9 mmHg       EKG/Rhythm:  SR     Extubation Date / Time:  at 1350    CT Output: 630 cc since surgery; 210 cc overnight    CXR:    Admission Weight: Last Weight   Weight - Scale: 95.7 kg (211 lb) Weight - Scale: 95.7 kg (211 lb)     Intake / Output / Drain:  Current Shift:  1901 -  0700  In: 578.4 [I.V.:480.4]  Out: 980 [Urine:770]  Last 24 hrs.:   Intake/Output Summary (Last 24 hours) at 2024 0654  Last data filed at 2024 0600  Gross per 24 hour   Intake 2121.42 ml   Output 3375 ml   Net -1253.58 ml       EXAM:  General:  No acute distress. WDWN man sitting in the chair.            Lungs:   Scattered rhonchi.  Diminished in bases. + pleural rub. Unlabored at rest on 4LNC.    Incision:  No

## 2024-11-13 NOTE — PROGRESS NOTES
0800 CTS rounding  Will dc dillard  CVP <5, will give 250mL albumin  OK to dc cardene gtt 1 hr s/p norvasc PO    1830 no urge to urinate yet  Bladder scan shows 238mL  Encouraging PO intake

## 2024-11-14 ENCOUNTER — APPOINTMENT (OUTPATIENT)
Facility: HOSPITAL | Age: 64
End: 2024-11-14
Attending: THORACIC SURGERY (CARDIOTHORACIC VASCULAR SURGERY)
Payer: COMMERCIAL

## 2024-11-14 PROBLEM — R73.9 TRANSIENT HYPERGLYCEMIA POST PROCEDURE: Status: ACTIVE | Noted: 2024-11-14

## 2024-11-14 PROBLEM — E11.9 NEW ONSET TYPE 2 DIABETES MELLITUS (HCC): Status: ACTIVE | Noted: 2024-11-14

## 2024-11-14 LAB
ANION GAP SERPL CALC-SCNC: 5 MMOL/L (ref 2–12)
BUN SERPL-MCNC: 24 MG/DL (ref 6–20)
BUN/CREAT SERPL: 18 (ref 12–20)
CALCIUM SERPL-MCNC: 8.6 MG/DL (ref 8.5–10.1)
CHLORIDE SERPL-SCNC: 111 MMOL/L (ref 97–108)
CO2 SERPL-SCNC: 26 MMOL/L (ref 21–32)
CREAT SERPL-MCNC: 1.36 MG/DL (ref 0.7–1.3)
ERYTHROCYTE [DISTWIDTH] IN BLOOD BY AUTOMATED COUNT: 14 % (ref 11.5–14.5)
GLUCOSE BLD STRIP.AUTO-MCNC: 101 MG/DL (ref 65–117)
GLUCOSE BLD STRIP.AUTO-MCNC: 106 MG/DL (ref 65–117)
GLUCOSE BLD STRIP.AUTO-MCNC: 108 MG/DL (ref 65–117)
GLUCOSE BLD STRIP.AUTO-MCNC: 115 MG/DL (ref 65–117)
GLUCOSE BLD STRIP.AUTO-MCNC: 119 MG/DL (ref 65–117)
GLUCOSE BLD STRIP.AUTO-MCNC: 125 MG/DL (ref 65–117)
GLUCOSE BLD STRIP.AUTO-MCNC: 139 MG/DL (ref 65–117)
GLUCOSE BLD STRIP.AUTO-MCNC: 188 MG/DL (ref 65–117)
GLUCOSE BLD STRIP.AUTO-MCNC: 190 MG/DL (ref 65–117)
GLUCOSE BLD STRIP.AUTO-MCNC: 221 MG/DL (ref 65–117)
GLUCOSE BLD STRIP.AUTO-MCNC: 89 MG/DL (ref 65–117)
GLUCOSE SERPL-MCNC: 92 MG/DL (ref 65–100)
HCT VFR BLD AUTO: 25.5 % (ref 36.6–50.3)
HGB BLD-MCNC: 8.4 G/DL (ref 12.1–17)
MAGNESIUM SERPL-MCNC: 2.7 MG/DL (ref 1.6–2.4)
MCH RBC QN AUTO: 30.9 PG (ref 26–34)
MCHC RBC AUTO-ENTMCNC: 32.9 G/DL (ref 30–36.5)
MCV RBC AUTO: 93.8 FL (ref 80–99)
NRBC # BLD: 0 K/UL (ref 0–0.01)
NRBC BLD-RTO: 0 PER 100 WBC
PLATELET # BLD AUTO: 115 K/UL (ref 150–400)
PMV BLD AUTO: 10.4 FL (ref 8.9–12.9)
POTASSIUM SERPL-SCNC: 4 MMOL/L (ref 3.5–5.1)
RBC # BLD AUTO: 2.72 M/UL (ref 4.1–5.7)
SERVICE CMNT-IMP: ABNORMAL
SERVICE CMNT-IMP: NORMAL
SODIUM SERPL-SCNC: 142 MMOL/L (ref 136–145)
WBC # BLD AUTO: 11.9 K/UL (ref 4.1–11.1)

## 2024-11-14 PROCEDURE — 6370000000 HC RX 637 (ALT 250 FOR IP): Performed by: NURSE PRACTITIONER

## 2024-11-14 PROCEDURE — 71045 X-RAY EXAM CHEST 1 VIEW: CPT

## 2024-11-14 PROCEDURE — 2580000003 HC RX 258: Performed by: PHYSICIAN ASSISTANT

## 2024-11-14 PROCEDURE — 6370000000 HC RX 637 (ALT 250 FOR IP): Performed by: PHYSICIAN ASSISTANT

## 2024-11-14 PROCEDURE — 97530 THERAPEUTIC ACTIVITIES: CPT

## 2024-11-14 PROCEDURE — 2060000000 HC ICU INTERMEDIATE R&B

## 2024-11-14 PROCEDURE — 99231 SBSQ HOSP IP/OBS SF/LOW 25: CPT | Performed by: CLINICAL NURSE SPECIALIST

## 2024-11-14 PROCEDURE — 6370000000 HC RX 637 (ALT 250 FOR IP)

## 2024-11-14 PROCEDURE — 2700000000 HC OXYGEN THERAPY PER DAY

## 2024-11-14 PROCEDURE — 6370000000 HC RX 637 (ALT 250 FOR IP): Performed by: ANESTHESIOLOGY

## 2024-11-14 PROCEDURE — 36415 COLL VENOUS BLD VENIPUNCTURE: CPT

## 2024-11-14 PROCEDURE — 80048 BASIC METABOLIC PNL TOTAL CA: CPT

## 2024-11-14 PROCEDURE — 6360000002 HC RX W HCPCS

## 2024-11-14 PROCEDURE — 6360000002 HC RX W HCPCS: Performed by: PHYSICIAN ASSISTANT

## 2024-11-14 PROCEDURE — 51798 US URINE CAPACITY MEASURE: CPT

## 2024-11-14 PROCEDURE — P9045 ALBUMIN (HUMAN), 5%, 250 ML: HCPCS

## 2024-11-14 PROCEDURE — 83735 ASSAY OF MAGNESIUM: CPT

## 2024-11-14 PROCEDURE — 94760 N-INVAS EAR/PLS OXIMETRY 1: CPT

## 2024-11-14 PROCEDURE — 97116 GAIT TRAINING THERAPY: CPT

## 2024-11-14 PROCEDURE — 94660 CPAP INITIATION&MGMT: CPT

## 2024-11-14 PROCEDURE — 85027 COMPLETE CBC AUTOMATED: CPT

## 2024-11-14 PROCEDURE — 6370000000 HC RX 637 (ALT 250 FOR IP): Performed by: CLINICAL NURSE SPECIALIST

## 2024-11-14 PROCEDURE — 82962 GLUCOSE BLOOD TEST: CPT

## 2024-11-14 RX ORDER — METOPROLOL TARTRATE 25 MG/1
12.5 TABLET, FILM COATED ORAL 2 TIMES DAILY
Status: DISCONTINUED | OUTPATIENT
Start: 2024-11-14 | End: 2024-11-15 | Stop reason: HOSPADM

## 2024-11-14 RX ORDER — INSULIN LISPRO 100 [IU]/ML
4 INJECTION, SOLUTION INTRAVENOUS; SUBCUTANEOUS
Status: DISCONTINUED | OUTPATIENT
Start: 2024-11-14 | End: 2024-11-15

## 2024-11-14 RX ORDER — ALBUMIN HUMAN 50 G/1000ML
SOLUTION INTRAVENOUS
Status: COMPLETED
Start: 2024-11-14 | End: 2024-11-14

## 2024-11-14 RX ORDER — FUROSEMIDE 10 MG/ML
20 INJECTION INTRAMUSCULAR; INTRAVENOUS ONCE
Status: COMPLETED | OUTPATIENT
Start: 2024-11-14 | End: 2024-11-14

## 2024-11-14 RX ORDER — INSULIN GLARGINE 100 [IU]/ML
30 INJECTION, SOLUTION SUBCUTANEOUS ONCE
Status: COMPLETED | OUTPATIENT
Start: 2024-11-14 | End: 2024-11-14

## 2024-11-14 RX ORDER — ALBUMIN HUMAN 50 G/1000ML
12.5 SOLUTION INTRAVENOUS ONCE
Status: COMPLETED | OUTPATIENT
Start: 2024-11-14 | End: 2024-11-14

## 2024-11-14 RX ORDER — GINSENG 100 MG
CAPSULE ORAL ONCE
Status: COMPLETED | OUTPATIENT
Start: 2024-11-14 | End: 2024-11-14

## 2024-11-14 RX ORDER — SODIUM PHOSPHATE, DIBASIC AND SODIUM PHOSPHATE, MONOBASIC 7; 19 G/230ML; G/230ML
1 ENEMA RECTAL DAILY PRN
Status: DISCONTINUED | OUTPATIENT
Start: 2024-11-14 | End: 2024-11-15 | Stop reason: HOSPADM

## 2024-11-14 RX ORDER — TAMSULOSIN HYDROCHLORIDE 0.4 MG/1
0.4 CAPSULE ORAL DAILY
Status: DISCONTINUED | OUTPATIENT
Start: 2024-11-14 | End: 2024-11-15 | Stop reason: HOSPADM

## 2024-11-14 RX ORDER — BLOOD-GLUCOSE METER
1 KIT MISCELLANEOUS DAILY
Qty: 1 KIT | Refills: 0 | Status: SHIPPED | OUTPATIENT
Start: 2024-11-14

## 2024-11-14 RX ORDER — AMLODIPINE BESYLATE 5 MG/1
2.5 TABLET ORAL DAILY
Status: DISCONTINUED | OUTPATIENT
Start: 2024-11-14 | End: 2024-11-15 | Stop reason: HOSPADM

## 2024-11-14 RX ORDER — GLUCOSAMINE HCL/CHONDROITIN SU 500-400 MG
CAPSULE ORAL
Qty: 100 STRIP | Refills: 1 | Status: SHIPPED | OUTPATIENT
Start: 2024-11-14

## 2024-11-14 RX ADMIN — INSULIN LISPRO 2 UNITS: 100 INJECTION, SOLUTION INTRAVENOUS; SUBCUTANEOUS at 21:11

## 2024-11-14 RX ADMIN — MUPIROCIN: 20 OINTMENT TOPICAL at 08:11

## 2024-11-14 RX ADMIN — AMIODARONE HYDROCHLORIDE 400 MG: 200 TABLET ORAL at 21:04

## 2024-11-14 RX ADMIN — GABAPENTIN 200 MG: 100 CAPSULE ORAL at 21:03

## 2024-11-14 RX ADMIN — MAGNESIUM HYDROXIDE 30 ML: 400 SUSPENSION ORAL at 09:20

## 2024-11-14 RX ADMIN — CHLORHEXIDINE GLUCONATE 15 ML: 1.2 RINSE ORAL at 21:07

## 2024-11-14 RX ADMIN — INSULIN LISPRO 2 UNITS: 100 INJECTION, SOLUTION INTRAVENOUS; SUBCUTANEOUS at 10:06

## 2024-11-14 RX ADMIN — GABAPENTIN 200 MG: 100 CAPSULE ORAL at 08:10

## 2024-11-14 RX ADMIN — ALBUMIN HUMAN 12.5 G: 50 SOLUTION INTRAVENOUS at 13:09

## 2024-11-14 RX ADMIN — GABAPENTIN 200 MG: 100 CAPSULE ORAL at 14:41

## 2024-11-14 RX ADMIN — ASPIRIN 81 MG: 81 TABLET, COATED ORAL at 08:10

## 2024-11-14 RX ADMIN — SODIUM CHLORIDE, PRESERVATIVE FREE 10 ML: 5 INJECTION INTRAVENOUS at 21:11

## 2024-11-14 RX ADMIN — INSULIN GLARGINE 30 UNITS: 100 INJECTION, SOLUTION SUBCUTANEOUS at 11:42

## 2024-11-14 RX ADMIN — ONDANSETRON 4 MG: 2 INJECTION INTRAMUSCULAR; INTRAVENOUS at 08:11

## 2024-11-14 RX ADMIN — INSULIN LISPRO 4 UNITS: 100 INJECTION, SOLUTION INTRAVENOUS; SUBCUTANEOUS at 18:19

## 2024-11-14 RX ADMIN — ACETAMINOPHEN 1000 MG: 500 TABLET ORAL at 00:35

## 2024-11-14 RX ADMIN — ATORVASTATIN CALCIUM 80 MG: 40 TABLET, FILM COATED ORAL at 21:03

## 2024-11-14 RX ADMIN — MAGNESIUM OXIDE TAB 400 MG (241.3 MG ELEMENTAL MG) 400 MG: 400 (241.3 MG) TAB at 08:11

## 2024-11-14 RX ADMIN — ACETAMINOPHEN 1000 MG: 500 TABLET ORAL at 13:08

## 2024-11-14 RX ADMIN — MUPIROCIN: 20 OINTMENT TOPICAL at 21:11

## 2024-11-14 RX ADMIN — HYDROMORPHONE HYDROCHLORIDE 2 MG: 2 TABLET ORAL at 08:10

## 2024-11-14 RX ADMIN — METOPROLOL TARTRATE 12.5 MG: 25 TABLET, FILM COATED ORAL at 08:11

## 2024-11-14 RX ADMIN — BACITRACIN 1 EACH: 500 OINTMENT TOPICAL at 05:59

## 2024-11-14 RX ADMIN — POLYETHYLENE GLYCOL 3350 17 G: 17 POWDER, FOR SOLUTION ORAL at 08:11

## 2024-11-14 RX ADMIN — AMLODIPINE BESYLATE 2.5 MG: 5 TABLET ORAL at 08:10

## 2024-11-14 RX ADMIN — CHLORHEXIDINE GLUCONATE 15 ML: 1.2 RINSE ORAL at 08:11

## 2024-11-14 RX ADMIN — MAGNESIUM OXIDE TAB 400 MG (241.3 MG ELEMENTAL MG) 400 MG: 400 (241.3 MG) TAB at 21:06

## 2024-11-14 RX ADMIN — TAMSULOSIN HYDROCHLORIDE 0.4 MG: 0.4 CAPSULE ORAL at 09:20

## 2024-11-14 RX ADMIN — ALBUMIN (HUMAN) 12.5 G: 12.5 INJECTION, SOLUTION INTRAVENOUS at 13:09

## 2024-11-14 RX ADMIN — HYDROMORPHONE HYDROCHLORIDE 2 MG: 2 TABLET ORAL at 04:11

## 2024-11-14 RX ADMIN — FAMOTIDINE 20 MG: 20 TABLET, FILM COATED ORAL at 08:10

## 2024-11-14 RX ADMIN — INSULIN LISPRO 2 UNITS: 100 INJECTION, SOLUTION INTRAVENOUS; SUBCUTANEOUS at 18:19

## 2024-11-14 RX ADMIN — AMIODARONE HYDROCHLORIDE 400 MG: 200 TABLET ORAL at 08:10

## 2024-11-14 RX ADMIN — SENNOSIDES AND DOCUSATE SODIUM 1 TABLET: 50; 8.6 TABLET ORAL at 08:11

## 2024-11-14 RX ADMIN — ACETAMINOPHEN 1000 MG: 500 TABLET ORAL at 18:19

## 2024-11-14 RX ADMIN — ACETAMINOPHEN 1000 MG: 500 TABLET ORAL at 05:59

## 2024-11-14 RX ADMIN — METOPROLOL TARTRATE 12.5 MG: 25 TABLET, FILM COATED ORAL at 21:06

## 2024-11-14 RX ADMIN — FAMOTIDINE 20 MG: 20 TABLET, FILM COATED ORAL at 21:05

## 2024-11-14 RX ADMIN — FUROSEMIDE 20 MG: 10 INJECTION, SOLUTION INTRAMUSCULAR; INTRAVENOUS at 09:20

## 2024-11-14 ASSESSMENT — PAIN SCALES - GENERAL
PAINLEVEL_OUTOF10: 6
PAINLEVEL_OUTOF10: 1

## 2024-11-14 ASSESSMENT — PAIN DESCRIPTION - LOCATION: LOCATION: CHEST

## 2024-11-14 ASSESSMENT — PAIN DESCRIPTION - DESCRIPTORS: DESCRIPTORS: ACHING

## 2024-11-14 ASSESSMENT — PAIN DESCRIPTION - ORIENTATION: ORIENTATION: ANTERIOR

## 2024-11-14 ASSESSMENT — PAIN DESCRIPTION - PAIN TYPE: TYPE: SURGICAL PAIN

## 2024-11-14 NOTE — PROGRESS NOTES
Chest tubes removed, pain much improved    1215 Pt standing to void, dizziness and pallor on the face with standing  MAP 65.  AAI pacing initiated for symptoms, pt intrinsic rate is NSR at 70  Symptoms resolved with pacing  CTS PA notified  Pt back to bed without issue

## 2024-11-14 NOTE — PROGRESS NOTES
----- Message from Forest Villatoro sent at 11/2/2022  9:26 AM EDT -----  Subject: Message to Provider    QUESTIONS  Information for Provider? Pt. states she is having problems with her   neuropathy and is wanting to know if Dr. Tia Ramos would prescribe her the   medication she gave her before for the pain in her feet. Pt. states her   neuropathy is getting worse, has been unable to see the foot doctor,   because she has moved and states her feet feels like an ice box and very   painful at night. Pt. states she is unable to sleep because of the   neuropathy. Pt. wants to know if she could put a heating pad on her feet   at night to keep them warm.  ---------------------------------------------------------------------------  --------------  Juan M BURGESS  [phone]; OK to leave message on voicemail  ---------------------------------------------------------------------------  --------------  SCRIPT ANSWERS  Relationship to Patient?  Self Cardiac Surgery ICU Progress Note    Admit Date: 2024  POD:  2 Days Post-Op    Procedure:  Procedure(s):  CORONARY ARTERY BYPASS GRAFTING X 3, RESVGH, LEFT RADIAL ARTERY HARVEST, ECC, VANESSA AND EPIAORTIC U/S BY DR PAINTER       [facility] course:  DOS: elective CABG X3 with left radial artery conduit harvest. To ICU on nicardipine, Precedex, neosynephrine and insulin infusions. Extubated on time. N&V. BP fell with compazine.  cc.   POD #1: 1113:  Fahad 30. Start Norvasc. Insulin gtt. Albumin X1.   POD2 14: Off drips. Tolerating CCB. Start metoprolol 12.5mg BID. Chest tubes pulled.   Subjective:   Patient seen with Dr. Ferraro. Sitting up in chair, comfortable. Afebrile, NSR 70's, 4L NC during day and CPAP overnight. Low UOP (150 overnight). Less than 300ml residuals on bladder scans. Wt up 10lbs since admission.     Objective:   Vitals:  Blood pressure (!) 149/57, pulse 71, temperature 98.1 °F (36.7 °C), temperature source Oral, resp. rate 19, height 1.778 m (5' 10\"), weight 100.6 kg (221 lb 11.2 oz), SpO2 94%.  Temp (24hrs), Av.3 °F (36.8 °C), Min:98.1 °F (36.7 °C), Max:98.4 °F (36.9 °C)    Ventilator Settings:      Mode Rate TV Press PEEP FiO2 PIP Min. Vent   SIMV/VC 16 bpm  500 mL    5 50 %  17 cmH2O           Oxygen Flow Rate: O2 Flow Rate (L/min): 4 L/min    Oxygen Delivery Method: O2 Device: PAP (positive airway pressure)      [place]Mus[place]aq  [place]  CVP (Mean): 13 mmHg       EKG/Rhythm:  SR     Extubation Date / Time:  at 1350    CT Output: 630 cc since surgery; 210 cc overnight    CXR:    Admission Weight: Last Weight   Weight - Scale: 95.7 kg (211 lb) Weight - Scale: 100.6 kg (221 lb 11.2 oz)     Intake / Output / Drain:  Current Shift: No intake/output data recorded.  Last 24 hrs.:   Intake/Output Summary (Last 24 hours) at 2024 0746  Last data filed at 2024 0400  Gross per 24 hour   Intake 1431.92 ml   Output 500 ml   Net 931.92 ml       EXAM:  General:  No acute distress.

## 2024-11-14 NOTE — PROGRESS NOTES
Cardiac Surgery Care Coordinator- Met with Pedro Montes De Oca, reviewed plan of care and discussed potential discharge date.  Reinforced sternal precautions and encouraged continued use of the incentive spirometer. Reviewed goals for the day and emphasized the importance of increased activity to meet discharge goals.  Provided Pedro Montes De Oca with red reminder bracelet.  Reviewed purpose of the bracelet and when to call MD. Will continue to follow for educational and emotional needs.

## 2024-11-14 NOTE — PROCEDURES
PROCEDURE NOTE  Date: 11/14/2024   Name: Pedro Montes De Oca  YOB: 1960    Procedures  Chest Tube Removal  No air leak seen in Pleur-evac. Chest tubes sites prepped with alcohol. Stay sutures cut. Chest tubes removed without complication. Patient tolerated well.

## 2024-11-14 NOTE — PLAN OF CARE
Problem: Physical Therapy - Adult  Goal: By Discharge: Performs mobility at highest level of function for planned discharge setting.  See evaluation for individualized goals.  Description: FUNCTIONAL STATUS PRIOR TO ADMISSION: Patient was independent and active without use of DME. Patient works full time as a . Active in the community.    HOME SUPPORT PRIOR TO ADMISSION: The patient lived with wife but did not require assistance.    Physical Therapy Goals  Initiated 11/13/2024  1.  Patient will move from supine to sit and sit to supine, scoot up and down, and roll side to side in bed with independence within 5 day(s).    2.  Patient will perform sit to stand with independence within 5 day(s).  3.  Patient will transfer from bed to chair and chair to bed with independence using the least restrictive device within 5 day(s).  4.  Patient will ambulate with independence for 100 feet with the least restrictive device within 5 day(s).   5.  Patient will ascend/descend 6 stairs with one handrail(s) with supervision/set-up within 5 day(s).  6.  Patient will perform cardiac exercises per protocol with supervision/set-up within 5 days.  7.  Patient will verbally recall and functionally demonstrate mindful-based movements (\"move in the tube\") principles without cues within 5 days.   Outcome: Progressing     PHYSICAL THERAPY TREATMENT    Patient: Pedro Montes De Oca (64 y.o. male)  Date: 11/14/2024  Diagnosis: Disease of cardiovascular system [I25.10]  CAD in native artery [I25.10] Disease of cardiovascular system  Procedure(s) (LRB):  CORONARY ARTERY BYPASS GRAFTING X 3, RESVGH, LEFT RADIAL ARTERY HARVEST, ECC, VANESSA AND EPIAORTIC U/S BY DR PAINTER (N/A) 2 Days Post-Op  Precautions: Fall Risk (Move in the Tube)                      ASSESSMENT:  Patient continues to benefit from skilled PT services and is slowly progressing towards goals. Pt received in chair on 4L supplemental O2. BP slightly soft before activity (90s/50s).

## 2024-11-14 NOTE — CARE COORDINATION
Transition of Care Plan:    RUR: 13% - low  Prior Level of Functioning: independent  Disposition: home health - referral to [facility]  ULISSES: 11/17/24  Follow up appointments: Cardiac Surgery  DME needed: none  Transportation at discharge: family  Caregiver Contact: wife - Nicole Montes De Oca - [phone]  Discharge Caregiver contacted prior to discharge? no  Care Conference needed? no  Barriers to discharge: medical    CM updated in Cardiac Surgery rounds this morning that patient will likely be ready for discharge this weekend. Home Health appropriate. Stepdown orders placed this afternoon.    CM sent home health order to [facility] via [employer] for review.    Disposition:   Home Health: [facility]  Transportation: Family    Nikolay Blevins, MPH  Care Manager l [facility]  Available via [employer] or

## 2024-11-14 NOTE — PROGRESS NOTES
2000: Bedside and Verbal shift change report given to CHELLY Short (oncoming nurse) by CHELLY Salvador (offgoing nurse). Report included the following information Nurse Handoff Report, Recent Results, and Cardiac Rhythm NSR .     0600: Removed arterial line. Hand held pressure for 5 mins. Sterile dressing applied. No evidence of bleeding or hematoma.     0615: Removed central line. Hand held pressure for 5 mins. Sterile dressing applied. No evidence of bleeding or hematoma.     0800: Bedside and Verbal shift change report given to CHELLY Salvador (oncoming nurse) by CHELLY Short (offgoing nurse). Report included the following information Index and Recent Results.

## 2024-11-14 NOTE — DIABETES MGMT
[facility]  [facility] FOR DI[facility]ES HEALTH  DIABETES MANAGEMENT CONSULT    Consulted by Provider for advanced nursing evaluation and care for inpatient blood glucose management.    Evaluation and Action Plan   Pedro Montes De Oca is a 64 y.o. male s/p CABG x3 11/12 for multivessel CAD.  Post op course progressing.  Noted pre-op A1C 6.5% which indicates diabetes.  Will discuss this with him at a more appropriate time prior to discharge.  Post op on insulin infusion and will transition off after 48h. Plan to transition off insulin infusion today at lunchtime. Discussed plan of care with RN.  Total past 24h needs 11/13: 86.7 units.  Will use 0.3u/kg weight based dosing for transition basal insulin dosing with pre meal bolus insulin with meals.    Discussed A1C with patient and that he has progressed to diabetes.  He denied ever being told that he had pre-diabetes.  He is active and uses a stair stepper daily and aims to get 8000/steps per day and also walks at lunch time daily around a track. Reviewed his diet - diet recall consistently with snacking daily -on whatever my  brings/ candies/ chips/ ice cream.  He consumes only water/ unsweet tea/ moderation with ETOH.  Counseled patient on reducing starches/ sweets in his diet.  He is on board with making diet changes.      Blood glucose pattern    Significant diabetes-related events over the past 24-72 hours  Insulin infusion, past 24h needs: 86.7 units      Management Rationale Action Plan   Medication  Transition off insulin infusion with: Lantus 30 units ONCE, will adjust dose if needed for 11/15.  START lispro 4 units TID with meals  CONTINUE cardiac surgery corrective scale ACHS.     Additional orders  Carb consistent diet (60g CHO/meal)       Diabetes Discharge Plan   Medication  Plan to  patient on diet /exercise adjustments-DONE   Referral  [x]        Outpatient diabetes education-would benefit   Additional orders  PCP follow up            HX:   Past  Medical History:   Diagnosis Date    Coronary artery disease involving native coronary artery 10/2/2017    HTN (hypertension)     Sleep apnea     uses cpap        INITIAL DX: Disease of cardiovascular system [I25.10]  CAD in native artery [I25.10]     Current Treatment     TX: cardiac surgery post op care    Diabetes History   Type Diabetes-new onset this admission per pre op A1C  Ambulatory BG management provided by: Travis Montez MD   Family History:    Lab Results   Component Value Date    LABA1C 6.5 (H) 11/06/2024     Diabetes-related Medical History  Neurological complications  NONE  Macrovascular disease  CAD and myocardial infarction with prior stent  Other associated conditions     Obesity/HLD/ HTN/TIA with CPAP    Diabetes Medication History: NONE PTA    Diabetes self-management practices:  Eating pattern-consistently with snacking daily -on whatever my  brings/ candies/ chips/ ice cream.  He consumes only water/ unsweet tea/ moderation with ETOH.  Skips breakfast most days, has PB &J sandwich for lunch.      Physical activity pattern-walks and uses step machine daily   [x] Employing a physical activity program to control BG    Social determinants of health impacting diabetes self-management practices    Concerned that you need to know more about how to stay healthy with diabetes      Subjective   \"I was not aware that I had diabetes\"     Objective   Physical exam  General Overweight male in no acute distress. Conversant and cooperative  Neuro  Alert and Oriented x4  Vital Signs   Vitals:    11/14/24 0900   BP: (!) 121/58   Pulse: 68   Resp: 18   Temp:    SpO2: 94%         Laboratory  Recent Labs     11/12/24  1744 11/13/24  0307 11/14/24  0353   WBC 20.2* 15.3* 11.9*   HGB 10.2* 9.3* 8.4*   HCT 30.0* 27.7* 25.5*   MCV 90.6 90.8 93.8    175 115*     Recent Labs     11/12/24  1744 11/13/24  0307 11/14/24  0353    143 142   K 3.8 4.2 4.0   * 115* 111*   CO2 22 25 26   BUN 17  17 24*   CREATININE 1.48* 1.21 1.36*     Lab Results   Component Value Date    ALT 33 11/12/2024    AST 29 11/12/2024    ALKPHOS 93 11/12/2024    BILITOT 0.9 11/12/2024     Lab Results   Component Value Date    TSH 3.19 11/06/2024         Factors impacting BG management  Factor Dose Comments   Nutrition:  Standard meals   60 grams/meal    Drugs:  Vasopressor load     On/off weaning   Affects insulin delivery     Pain prn    Infection NA    Kidney function ?SUMIT    Liver function WNL          Assessment and Nursing Intervention   Nursing Diagnosis Risk for unstable blood glucose pattern   Nursing Intervention Domain 5250 Decision-making Support   Nursing Interventions Examined current inpatient diabetes/blood glucose control   Explored factors facilitating and impeding inpatient management  Explored corrective strategies with patient and responsible inpatient provider   Informed patient of rational for insulin strategy while hospitalized     Nursing Diagnosis 54795 Ineffective Health Management   Nursing Intervention Domain 5250 Decision-making Support   Nursing Interventions Identified diabetes self-management practices impeding diabetes control  Discussed diabetes survival skills related to  Healthy Plate eating plan; given handouts  Role of physical activity in improving insulin sensitivity and action  Procedure for blood glucose monitoring & options for low-cost products  Medications plan at discharge     Billing Code(s)   81047    Before making these care recommendations, I personally reviewed the hospitalization record, including notes, laboratory & diagnostic data and current medications, and examined the patient at the bedside.  Total minutes: 25    LISA REECE - CNS   Diabetes Clinical Nurse Specialist and Board Certified Advanced Diabetes Manager  [facility]  Access via [employer]

## 2024-11-15 ENCOUNTER — APPOINTMENT (OUTPATIENT)
Facility: HOSPITAL | Age: 64
End: 2024-11-15
Attending: THORACIC SURGERY (CARDIOTHORACIC VASCULAR SURGERY)
Payer: COMMERCIAL

## 2024-11-15 VITALS
BODY MASS INDEX: 31.91 KG/M2 | DIASTOLIC BLOOD PRESSURE: 55 MMHG | HEART RATE: 66 BPM | TEMPERATURE: 98.3 F | WEIGHT: 222.9 LBS | HEIGHT: 70 IN | RESPIRATION RATE: 14 BRPM | SYSTOLIC BLOOD PRESSURE: 125 MMHG | OXYGEN SATURATION: 91 %

## 2024-11-15 PROBLEM — E11.65 TYPE 2 DIABETES MELLITUS WITH HYPERGLYCEMIA, WITHOUT LONG-TERM CURRENT USE OF INSULIN (HCC): Status: ACTIVE | Noted: 2024-11-15

## 2024-11-15 LAB
ANION GAP SERPL CALC-SCNC: 4 MMOL/L (ref 2–12)
BASOPHILS # BLD: 0 K/UL (ref 0–0.1)
BASOPHILS NFR BLD: 0 % (ref 0–1)
BUN SERPL-MCNC: 27 MG/DL (ref 6–20)
BUN/CREAT SERPL: 17 (ref 12–20)
CALCIUM SERPL-MCNC: 8.3 MG/DL (ref 8.5–10.1)
CHLORIDE SERPL-SCNC: 108 MMOL/L (ref 97–108)
CO2 SERPL-SCNC: 26 MMOL/L (ref 21–32)
CREAT SERPL-MCNC: 1.58 MG/DL (ref 0.7–1.3)
DIFFERENTIAL METHOD BLD: ABNORMAL
EOSINOPHIL # BLD: 0 K/UL (ref 0–0.4)
EOSINOPHIL NFR BLD: 0 % (ref 0–7)
ERYTHROCYTE [DISTWIDTH] IN BLOOD BY AUTOMATED COUNT: 13.6 % (ref 11.5–14.5)
GLUCOSE BLD STRIP.AUTO-MCNC: 152 MG/DL (ref 65–117)
GLUCOSE BLD STRIP.AUTO-MCNC: 249 MG/DL (ref 65–117)
GLUCOSE SERPL-MCNC: 145 MG/DL (ref 65–100)
HCT VFR BLD AUTO: 27.3 % (ref 36.6–50.3)
HGB BLD-MCNC: 9 G/DL (ref 12.1–17)
IMM GRANULOCYTES # BLD AUTO: 0 K/UL (ref 0–0.04)
IMM GRANULOCYTES NFR BLD AUTO: 0 % (ref 0–0.5)
LYMPHOCYTES # BLD: 0.6 K/UL (ref 0.8–3.5)
LYMPHOCYTES NFR BLD: 9 % (ref 12–49)
MCH RBC QN AUTO: 30.7 PG (ref 26–34)
MCHC RBC AUTO-ENTMCNC: 33 G/DL (ref 30–36.5)
MCV RBC AUTO: 93.2 FL (ref 80–99)
MONOCYTES # BLD: 0.9 K/UL (ref 0–1)
MONOCYTES NFR BLD: 12 % (ref 5–13)
NEUTS SEG # BLD: 5.7 K/UL (ref 1.8–8)
NEUTS SEG NFR BLD: 79 % (ref 32–75)
NRBC # BLD: 0 K/UL (ref 0–0.01)
NRBC BLD-RTO: 0 PER 100 WBC
PLATELET # BLD AUTO: 131 K/UL (ref 150–400)
PMV BLD AUTO: 9.6 FL (ref 8.9–12.9)
POTASSIUM SERPL-SCNC: 3.8 MMOL/L (ref 3.5–5.1)
RBC # BLD AUTO: 2.93 M/UL (ref 4.1–5.7)
RBC MORPH BLD: ABNORMAL
SERVICE CMNT-IMP: ABNORMAL
SERVICE CMNT-IMP: ABNORMAL
SODIUM SERPL-SCNC: 138 MMOL/L (ref 136–145)
WBC # BLD AUTO: 7.2 K/UL (ref 4.1–11.1)

## 2024-11-15 PROCEDURE — 99232 SBSQ HOSP IP/OBS MODERATE 35: CPT | Performed by: CLINICAL NURSE SPECIALIST

## 2024-11-15 PROCEDURE — 94760 N-INVAS EAR/PLS OXIMETRY 1: CPT

## 2024-11-15 PROCEDURE — 36415 COLL VENOUS BLD VENIPUNCTURE: CPT

## 2024-11-15 PROCEDURE — 97116 GAIT TRAINING THERAPY: CPT

## 2024-11-15 PROCEDURE — 97535 SELF CARE MNGMENT TRAINING: CPT

## 2024-11-15 PROCEDURE — 82962 GLUCOSE BLOOD TEST: CPT

## 2024-11-15 PROCEDURE — 85025 COMPLETE CBC W/AUTO DIFF WBC: CPT

## 2024-11-15 PROCEDURE — 80048 BASIC METABOLIC PNL TOTAL CA: CPT

## 2024-11-15 PROCEDURE — 6370000000 HC RX 637 (ALT 250 FOR IP): Performed by: PHYSICIAN ASSISTANT

## 2024-11-15 PROCEDURE — 6370000000 HC RX 637 (ALT 250 FOR IP): Performed by: CLINICAL NURSE SPECIALIST

## 2024-11-15 PROCEDURE — 94660 CPAP INITIATION&MGMT: CPT

## 2024-11-15 PROCEDURE — 2700000000 HC OXYGEN THERAPY PER DAY

## 2024-11-15 PROCEDURE — 71046 X-RAY EXAM CHEST 2 VIEWS: CPT

## 2024-11-15 RX ORDER — POLYETHYLENE GLYCOL 3350 17 G/17G
17 POWDER, FOR SOLUTION ORAL DAILY PRN
Status: SHIPPED | COMMUNITY
Start: 2024-11-15 | End: 2024-12-15

## 2024-11-15 RX ORDER — GABAPENTIN 100 MG/1
200 CAPSULE ORAL 3 TIMES DAILY
Qty: 60 CAPSULE | Refills: 0 | Status: SHIPPED | OUTPATIENT
Start: 2024-11-15 | End: 2024-11-25

## 2024-11-15 RX ORDER — SENNA AND DOCUSATE SODIUM 50; 8.6 MG/1; MG/1
1 TABLET, FILM COATED ORAL DAILY PRN
Status: SHIPPED | COMMUNITY
Start: 2024-11-15

## 2024-11-15 RX ORDER — INSULIN GLARGINE 100 [IU]/ML
20 INJECTION, SOLUTION SUBCUTANEOUS DAILY
Status: DISCONTINUED | OUTPATIENT
Start: 2024-11-15 | End: 2024-11-15 | Stop reason: HOSPADM

## 2024-11-15 RX ORDER — INSULIN LISPRO 100 [IU]/ML
6 INJECTION, SOLUTION INTRAVENOUS; SUBCUTANEOUS
Status: DISCONTINUED | OUTPATIENT
Start: 2024-11-15 | End: 2024-11-15 | Stop reason: HOSPADM

## 2024-11-15 RX ORDER — HYDROMORPHONE HYDROCHLORIDE 2 MG/1
2 TABLET ORAL EVERY 4 HOURS PRN
Qty: 30 TABLET | Refills: 0 | Status: SHIPPED | OUTPATIENT
Start: 2024-11-15 | End: 2024-11-20

## 2024-11-15 RX ORDER — TAMSULOSIN HYDROCHLORIDE 0.4 MG/1
0.4 CAPSULE ORAL DAILY
Qty: 30 CAPSULE | Refills: 0 | Status: SHIPPED | OUTPATIENT
Start: 2024-11-15

## 2024-11-15 RX ORDER — AMLODIPINE BESYLATE 2.5 MG/1
2.5 TABLET ORAL DAILY
Qty: 30 TABLET | Refills: 5 | Status: SHIPPED | OUTPATIENT
Start: 2024-11-15

## 2024-11-15 RX ORDER — ACETAMINOPHEN 325 MG/1
975 TABLET ORAL EVERY 6 HOURS PRN
Status: SHIPPED | COMMUNITY
Start: 2024-11-15

## 2024-11-15 RX ORDER — AMIODARONE HYDROCHLORIDE 200 MG/1
TABLET ORAL
Qty: 20 TABLET | Refills: 0 | Status: SHIPPED | OUTPATIENT
Start: 2024-11-15 | End: 2024-11-18 | Stop reason: SDUPTHER

## 2024-11-15 RX ADMIN — GABAPENTIN 200 MG: 100 CAPSULE ORAL at 13:11

## 2024-11-15 RX ADMIN — AMLODIPINE BESYLATE 2.5 MG: 5 TABLET ORAL at 08:33

## 2024-11-15 RX ADMIN — ACETAMINOPHEN 1000 MG: 500 TABLET ORAL at 06:54

## 2024-11-15 RX ADMIN — GABAPENTIN 200 MG: 100 CAPSULE ORAL at 08:33

## 2024-11-15 RX ADMIN — POLYETHYLENE GLYCOL 3350 17 G: 17 POWDER, FOR SOLUTION ORAL at 08:33

## 2024-11-15 RX ADMIN — ASPIRIN 81 MG: 81 TABLET, COATED ORAL at 08:33

## 2024-11-15 RX ADMIN — ACETAMINOPHEN 1000 MG: 500 TABLET ORAL at 01:00

## 2024-11-15 RX ADMIN — METOPROLOL TARTRATE 12.5 MG: 25 TABLET, FILM COATED ORAL at 08:32

## 2024-11-15 RX ADMIN — AMIODARONE HYDROCHLORIDE 400 MG: 200 TABLET ORAL at 08:33

## 2024-11-15 RX ADMIN — CHLORHEXIDINE GLUCONATE 15 ML: 1.2 RINSE ORAL at 08:34

## 2024-11-15 RX ADMIN — INSULIN LISPRO 4 UNITS: 100 INJECTION, SOLUTION INTRAVENOUS; SUBCUTANEOUS at 13:07

## 2024-11-15 RX ADMIN — INSULIN LISPRO 2 UNITS: 100 INJECTION, SOLUTION INTRAVENOUS; SUBCUTANEOUS at 08:43

## 2024-11-15 RX ADMIN — MAGNESIUM OXIDE TAB 400 MG (241.3 MG ELEMENTAL MG) 400 MG: 400 (241.3 MG) TAB at 08:32

## 2024-11-15 RX ADMIN — INSULIN LISPRO 4 UNITS: 100 INJECTION, SOLUTION INTRAVENOUS; SUBCUTANEOUS at 13:06

## 2024-11-15 RX ADMIN — SENNOSIDES AND DOCUSATE SODIUM 1 TABLET: 50; 8.6 TABLET ORAL at 08:32

## 2024-11-15 RX ADMIN — TAMSULOSIN HYDROCHLORIDE 0.4 MG: 0.4 CAPSULE ORAL at 08:33

## 2024-11-15 RX ADMIN — INSULIN GLARGINE 20 UNITS: 100 INJECTION, SOLUTION SUBCUTANEOUS at 10:13

## 2024-11-15 RX ADMIN — MUPIROCIN: 20 OINTMENT TOPICAL at 08:34

## 2024-11-15 RX ADMIN — INSULIN LISPRO 4 UNITS: 100 INJECTION, SOLUTION INTRAVENOUS; SUBCUTANEOUS at 08:43

## 2024-11-15 RX ADMIN — FAMOTIDINE 20 MG: 20 TABLET, FILM COATED ORAL at 08:32

## 2024-11-15 NOTE — PLAN OF CARE
Intervention:              Upper Extremity Dressing: .  - Pull Over Shirt : Minimal Assistance and Seated in chair   - Front Open Shirt : Minimal Assistance and Seated in chair    Lower Extremity Dressing: .  - Underwear: Contact Guard Assistance, Standing, and Seated in chair               Patient instructed and educated on mindful movement principles based on “Move in The Tube” maintaining bilateral elbows close to rib cage when performing any load-bearing activity.  Educated on applying this concept when getting in/out of bed, pushing up from a chair, opening a door, or lifting a box.  Patient has been provided handouts with diagrams of each correct/incorrect method of performing each of the above tasks.     Patient instructed on the ability to utilize upper extremities “outside the tube” when doing any non-load bearing activity such as washing hair/body, brushing teeth, retrieving clothing items, or scratching your back. Patient encouraged to also perform upper extremity exercises \"outside of the tube\" to prevent scar tissue formation around sternal incision site.    Patient instructed in detail about activities to heed with caution, allowing pain to be the guide. These activities include but are not limited to: mowing the lawn, riding a bike, walking a dog, lifting a child, workshop hobbies, golfing, sexual activity, vacuuming, fishing, scrubbing the floors, and moving furniture. Patient was given the “Keep Your Move in the Tube” handout to describe each of these activities in detail.     Increase activity tolerance for home, work, and sexual intercourse by pacing self with increasing the arm exercises, sitting duration, frequency OOB, walking, standing, and ADLs. Instructed and indicated understanding of s/s of too much activity, how to respond to s/s safely.    Patient instructed on no asymmetrical reaching over head to ensure BUEs are lifted together above 90* of shoulder flexion.    Pt instructed that they  may have to adjust home setup to increase ease with items closer to waist height to prevent deep bending and asymmetrical UE WB/pushing for stabilization during bending.     Upper Body Dressing Education:    Pullover Shirt:     -place pull over shirt face down, thread bilateral UE through sleeves and pull up to mid humerus   -grasp shirt with bilateral hands at neck and bottom of shirt    -bring over head with both arms going up at the same time  -rotate at waist with shoulder following hip motion to pull shirt tail down    Pt was able to don pull over shirt with Minimal Assist.      Open front shirt:   -pull shirt over one arm first   -reaching symmetrically with both UE over head to grasp collar of shirt   -thread shirt over other arm  -doff by grabbing at collar with bilateral hands over head and pulling shirt over head    Lower Body Dressing:  Educated on the benefits of donning clothing tailor sitting and don all clothing while sitting prior to standing. Patient demonstrated lower body dressing with Contact Guard Assist. Instructed on and indicated excellent understanding on the benefits of loose clothing throughout to accommodate for post surgical swelling, decreased ROM and increased pain.     Toileting: Educated to rotate at the waist having shoulders rotate with waist to perform petra/anal care with Good understanding.  Instructed if they have continued pain, decreased functional reach with shoulder IR for BM hygiene can use wet wipes and toilet tongs PRN. Reinforced to avoid valsalva maneuvers with all tasks.                      Pain Ratin/10     Pain Intervention(s):   pain is at a level acceptable to the patient      Activity Tolerance:   Good and requires rest breaks  Please refer to the flowsheet for vital signs taken during this treatment.    After treatment:   Patient left in no apparent distress sitting up in chair and Call bell within reach    COMMUNICATION/EDUCATION:   The patient's plan of

## 2024-11-15 NOTE — PROGRESS NOTES
0745: Bedside and Verbal shift change report received from Deja GROVE RN to Kandace RN. Report included the following information Nurse Handoff Report, Index, Intake/Output, MAR, Recent Results, Med Rec Status, Cardiac Rhythm NSR, and Alarm Parameters.     0800: Cardiac surgery rounding at bedside, plan for discharge today.     0945: Pt down for PA/lateral.     1545; Pt discharged at this time- written and verbal CABG post operative/medication and discharge education provided to pt and family. All questions answered at this time. VSS. Pt wheeled down and buckled into back seat of care.

## 2024-11-15 NOTE — DIABETES MGMT
[facility]  PROGRAM FOR DIABETES HEALTH  DIABETES MANAGEMENT CONSULT    Consulted by Provider for advanced nursing evaluation and care for inpatient blood glucose management.    Evaluation and Action Plan   Pedro Montes De Oca is a 64 y.o. male s/p CABG x3 11/12 for multivessel CAD.  Post op course progressing.  Noted pre-op A1C 6.5% which indicates diabetes.  Will discuss this with him at a more appropriate time prior to discharge. Transitioned off insulin drip 11/14 with 30 units of Lantus. Will plan to reduce Lantus to 20 units for today.    11/14- Discussed A1C with patient and that he has progressed to diabetes.  He denied ever being told that he had pre-diabetes.  He is active and uses a stair stepper daily and aims to get 8000/steps per day and also walks at lunch time daily around a track. Reviewed his diet - diet recall consistently with snacking daily -on whatever my  brings/ candies/ chips/ ice cream.  He consumes only water/ unsweet tea/ moderation with ETOH.  Counseled patient on reducing starches/ sweets in his diet.  He is on board with making diet changes.    Blood glucose pattern        Significant diabetes-related events over the past 24-72 hours  Insulin infusion transitioned off with 30 units Lantus      Management Rationale Action Plan   Medication  CHANGE Lantus to 20 units daily  CONTINUE lispro 4 units TID with meals  CONTINUE cardiac surgery corrective scale ACHS.     Additional orders  Carb consistent diet (60g CHO/meal)       Diabetes Discharge Plan   Medication  Plan to  patient on diet /exercise adjustments-DONE   Referral  [x]        Outpatient diabetes education-would benefit   Additional orders  PCP follow up            HX:   Past Medical History:   Diagnosis Date    Coronary artery disease involving native coronary artery 10/2/2017    HTN (hypertension)     Sleep apnea     uses cpap        INITIAL DX: Disease of cardiovascular system [I25.10]  CAD in native artery  [I25.10]     Current Treatment     TX: cardiac surgery post op care    Diabetes History   Type Diabetes-new onset this admission per pre op A1C  Ambulatory BG management provided by: Travis Montez MD   Family History:    Lab Results   Component Value Date    LABA1C 6.5 (H) 11/06/2024     Diabetes-related Medical History  Neurological complications  NONE  Macrovascular disease  CAD and myocardial infarction with prior stent  Other associated conditions     Obesity/HLD/ HTN/TIA with CPAP    Diabetes Medication History: NONE PTA    Diabetes self-management practices:  Eating pattern-consistently with snacking daily -on whatever my  brings/ candies/ chips/ ice cream.  He consumes only water/ unsweet tea/ moderation with ETOH.  Skips breakfast most days, has PB &J sandwich for lunch.      Physical activity pattern-walks and uses step machine daily   [x] Employing a physical activity program to control BG    Social determinants of health impacting diabetes self-management practices    Concerned that you need to know more about how to stay healthy with diabetes      Subjective   \"I was not aware that I had diabetes\"     Objective   Physical exam  General Overweight male in no acute distress. Conversant and cooperative  Neuro  Alert and Oriented x4  Vital Signs   Vitals:    11/15/24 0800   BP: 127/60   Pulse: 67   Resp: 19   Temp:    SpO2: 93%         Laboratory  Recent Labs     11/13/24  0307 11/14/24  0353 11/15/24  0825   WBC 15.3* 11.9* 7.2   HGB 9.3* 8.4* 9.0*   HCT 27.7* 25.5* 27.3*   MCV 90.8 93.8 93.2    115* 131*     Recent Labs     11/12/24  1744 11/13/24  0307 11/14/24  0353    143 142   K 3.8 4.2 4.0   * 115* 111*   CO2 22 25 26   BUN 17 17 24*   CREATININE 1.48* 1.21 1.36*     Lab Results   Component Value Date    ALT 33 11/12/2024    AST 29 11/12/2024    ALKPHOS 93 11/12/2024    BILITOT 0.9 11/12/2024     Lab Results   Component Value Date    TSH 3.19 11/06/2024

## 2024-11-15 NOTE — CARE COORDINATION
MARLON    The discharge order is in and CM met w patient at bedside to discuss the plan w [facility].  Patient agrees w d/c but before he leaves he wants to know how to do upper body and lower body dressing and practice stairs.  He reports his wife will transport home and denies needing any resources.  All questions have been answered and CM wished patient well.    Notified [facility] of d/c so they can follow up w start of care.    Vernell Abrams, MSW CCM  Care Management

## 2024-11-15 NOTE — PROGRESS NOTES
Cardiac Surgery Care Coordinator- Met with Pedro Montes De Oca reviewed plan of care and discussed upcoming discharge later today, placed call to his wife and provided update. She will be in later this morning.

## 2024-11-15 NOTE — PROGRESS NOTES
Cardiac Surgery ICU Progress Note    Admit Date: 2024  POD:  3 Days Post-Op    Procedure:  Procedure(s):  CORONARY ARTERY BYPASS GRAFTING X 3, RESVGH, LEFT RADIAL ARTERY HARVEST, ECC, VANESSA AND EPIAORTIC U/S BY DR PAINTER       [facility] course:  DOS: elective CABG X3 with left radial artery conduit harvest. To ICU on nicardipine, Precedex, neosynephrine and insulin infusions. Extubated on time. N&V. BP fell with compazine.  cc.   POD #1: 1113:  Fahad 30. Start Norvasc. Insulin gtt. Albumin X1.   POD2 14: Off drips. Tolerating CCB. Start metoprolol 12.5mg BID. Chest tubes pulled.  POD3 15: Episode of orthostasis x 1. On 2L NC.    Subjective:   Patient seen with Dr. Ferraro. Sitting up in chair, comfortable. Afebrile, NSR 70's, on 2L NC. Received IV lasix 20mg x 1 and started flomax yesterday. Good OUP following. Pain much improved after chest tube removal.     Objective:   Vitals:  Blood pressure 117/61, pulse 86, temperature 98.5 °F (36.9 °C), resp. rate 15, height 1.778 m (5' 10\"), weight 101.1 kg (222 lb 14.4 oz), SpO2 93%.  Temp (24hrs), Av.3 °F (36.8 °C), Min:97.4 °F (36.3 °C), Max:99.2 °F (37.3 °C)    Ventilator Settings:      Mode Rate TV Press PEEP FiO2 PIP Min. Vent   SIMV/VC 16 bpm  500 mL    5 50 %  17 cmH2O           Oxygen Flow Rate: O2 Flow Rate (L/min): 2 L/min    Oxygen Delivery Method: O2 Device: PAP (positive airway pressure) (CPAP)      [place]  [place]  CVP (Mean): 8 mmHg       EKG/Rhythm:  SR     Extubation Date / Time:  at 1350    CT Output: 630 cc since surgery; 210 cc overnight    CXR:  Xray Result (most recent):  XR CHEST PORTABLE 2024    Narrative  EXAM:  XR CHEST PORTABLE    INDICATION: Mild pulmonary edema    COMPARISON: 2024    TECHNIQUE: Portable AP semiupright chest view at 0342 hours    FINDINGS: A right IJ catheter, mediastinal drain, and left chest tube are  stable. The cardiomediastinal contours are stable.    Interstitial lung opacities have

## 2024-11-15 NOTE — PLAN OF CARE
Problem: Pain  Goal: Verbalizes/displays adequate comfort level or baseline comfort level  Outcome: Adequate for Discharge     Problem: Safety - Adult  Goal: Free from fall injury  Outcome: Adequate for Discharge     Problem: Safety - Medical Restraint  Goal: Remains free of injury from restraints (Restraint for Interference with Medical Device)  Description: INTERVENTIONS:  1. Determine that other, less restrictive measures have been tried or would not be effective before applying the restraint  2. Evaluate the patient's condition at the time of restraint application  3. Inform patient/family regarding the reason for restraint  4. Q2H: Monitor safety, psychosocial status, comfort, nutrition and hydration  Outcome: Adequate for Discharge     Problem: Discharge Planning  Goal: Discharge to home or other facility with appropriate resources  Outcome: Adequate for Discharge     Problem: Skin/Tissue Integrity  Goal: Absence of new skin breakdown  Description: 1.  Monitor for areas of redness and/or skin breakdown  2.  Assess vascular access sites hourly  3.  Every 4-6 hours minimum:  Change oxygen saturation probe site  4.  Every 4-6 hours:  If on nasal continuous positive airway pressure, respiratory therapy assess nares and determine need for appliance change or resting period.  Outcome: Adequate for Discharge     Problem: Chronic Conditions and Co-morbidities  Goal: Patient's chronic conditions and co-morbidity symptoms are monitored and maintained or improved  Outcome: Adequate for Discharge

## 2024-11-15 NOTE — DIABETES MGMT
[facility]  PROGRAM FOR DIABETES HEALTH  DIABETES MANAGEMENT CONSULT    Consulted by Provider for advanced nursing evaluation and care for inpatient blood glucose management.    Evaluation and Action Plan   Pedro Montes De Oca is a 64 y.o. male s/p CABG x3 11/12 for multivessel CAD.  The [facility] has been consulted to assist in glycemic management and advanced diabetes management assessment this admission.  Pre-op A1C elevated at 6.5% which indicates diabetes.  Was placed on insulin gtt per [facility] protocol for management of stress hyperglycemia.  He transitioned off the insulin gtt with 30 units Glargine.     Discussed elevated A1C with patient with new diagnosis of Type 2 Diabetes.  He is active and uses a stair stepper daily and aims to get 8000/steps per day and also walks at lunch time daily around a track. Reviewed his diet - diet recall consistently with snacking daily -on whatever my  brings/ candies/ chips/ ice cream.  He consumes only water/ unsweet tea/ moderation with ETOH.  Counseled patient on reducing starches/ sweets in his diet.  He is on board with making diet changes.    Glucose has been elevated this admission s/t stress hyperglycemia.  Currently on 20 units Lantus and mealtime humalog.  Will discharge today and will need antihyperglycemic agents at d/c.         Blood glucose pattern      Significant diabetes-related events over the past 24-72 hours  Fasting 145  Pre-prandial: 190-221  Lantus 30 units daily      Management Rationale Action Plan   Medication  Basal: Lantus 20 units daily. Reduce by 4 units if fasting glucose below 100mg/dl.    Increased lispro 6 units TID with meals.    Continue cardiac surgery corrective scale ACHS.     Additional orders  Carb consistent diet (60g CHO/meal)       Diabetes Discharge Plan   Medication  Plan to  patient on diet /exercise adjustments    Metformin 500mg BID   Referral  [x]        Outpatient diabetes education- Order placed.

## 2024-11-15 NOTE — PROGRESS NOTES
2000: Bedside and Verbal shift change report given to CHELLY Short (oncoming nurse) by CHELLY Salvador (offgoing nurse). Report included the following information Recent Results and Cardiac Rhythm atrial paced/nsr .

## 2024-11-15 NOTE — PROCEDURES
[facility] Procedure Note:    Patient placed in the bed. Pacing wire insertion site cleaned with Chlorhexadine. Sutures removed. Atrial wires X 2 and Bipolar ventricular wire X 1 (cut at the skin with gentle traction). Patient tolerated the procedure well.  RN aware.     LISA Meadows - NP

## 2024-11-15 NOTE — PLAN OF CARE
Problem: Physical Therapy - Adult  Goal: By Discharge: Performs mobility at highest level of function for planned discharge setting.  See evaluation for individualized goals.  Description: FUNCTIONAL STATUS PRIOR TO ADMISSION: Patient was independent and active without use of DME. Patient works full time as a . Active in the community.    HOME SUPPORT PRIOR TO ADMISSION: The patient lived with wife but did not require assistance.    Physical Therapy Goals  Initiated 11/13/2024  1.  Patient will move from supine to sit and sit to supine, scoot up and down, and roll side to side in bed with independence within 5 day(s).    2.  Patient will perform sit to stand with independence within 5 day(s).  3.  Patient will transfer from bed to chair and chair to bed with independence using the least restrictive device within 5 day(s).  4.  Patient will ambulate with independence for 100 feet with the least restrictive device within 5 day(s).   5.  Patient will ascend/descend 6 stairs with one handrail(s) with supervision/set-up within 5 day(s).  6.  Patient will perform cardiac exercises per protocol with supervision/set-up within 5 days.  7.  Patient will verbally recall and functionally demonstrate mindful-based movements (\"move in the tube\") principles without cues within 5 days.   Outcome: Progressing    PHYSICAL THERAPY TREATMENT    Patient: Pedro Montes De Oca (64 y.o. male)  Date: 11/15/2024  Diagnosis: Disease of cardiovascular system [I25.10]  CAD in native artery [I25.10] Disease of cardiovascular system  Procedure(s) (LRB):  CORONARY ARTERY BYPASS GRAFTING X 3, RESVGH, LEFT RADIAL ARTERY HARVEST, ECC, VANESSA AND EPIAORTIC U/S BY DR PAINTER (N/A) 3 Days Post-Op  Precautions: Fall Risk (Move in the Tube)                      ASSESSMENT:  Patient continues to benefit from skilled PT services and is progressing towards goals. Received sitting in chair, requesting to return to bed. Participated in ambulation 50 ft x 2  (unsupported)  Sitting - Dynamic: Good (unsupported);Fair (occasional)  Standing: Impaired  Standing - Static: Constant support;Good  Standing - Dynamic: Constant support;Fair   Ambulation/Gait Training:     Gait  Gait Training: Yes  Distance (ft): 100 Feet (50 x 2)  Assistive Device: Gait belt  Interventions: Verbal cues  Base of Support: Center of gravity altered  Speed/Windy: Pace decreased (< 100 feet/min);Shuffled  Step Length: Right shortened;Left shortened  Stance: Weight shift;Right decreased;Left decreased  Gait Abnormalities: Decreased step clearance;Trunk sway increased;Shuffling gait;Path deviations  Stairs - Level of Assistance: Contact-guard assistance  Number of Stairs Trained: 4                                                                                                                                                                                                                              Intervention/Education specific to: \"Move in the tube\"  Patient mobilized on continuous portable monitor/telemetry.    The patient is verbalizing and is demonstrating understanding of mindful-based movements (\"move in the tube\") principles of keeping UEs proximal to ribcage to prevent lateral pull on the sternum during load-bearing activities with verbal cues required for compliance.    Cardiac diagnosis intervention:  Patient instructed and educated on mindful movement principles based on “Move in The Tube” concept to include maintaining bilateral elbows close to rib cage when performing any load-bearing activity such as getting in/out of bed, pushing up from a chair, opening a door, or lifting a box.  Patient was given a handout with diagrams of each correct/incorrect method of performing each of the above tasks.

## 2024-11-15 NOTE — DISCHARGE INSTRUCTIONS
Cardiac Surgery Specialist    [address] [facility] [address]       [address] [facility]                             [place] [address]                       [facility] [address]  Office- [phone]  Fax- [phone]       Office- [phone]  Fax- [phone]  _____________________________________________________________  Dr. Gonzales Hoang, NP            Elizabeth Guzman, NP  Dr. Allan Martell,NP       GROVER Hayward, PAClydeC  Dr. Andrae Bui, NP     Porfirio Kramer, PAClydeC  Dr. Rickie Stout, NP     Machelle Monique, PAClydeC  Dr. James Melgoza, OSMAN Hawkins, PA-C                   _____________________________________________________________    Name:Pedro Montes De Oca     Surgery & Date: Procedure(s):  CORONARY ARTERY BYPASS GRAFTING X 3, RESVGH, LEFT RADIAL ARTERY HARVEST, ECC, VANESSA AND EPIAORTIC U/S BY DR PAINTER    Discharge Date: [unfilled]     MEDICATIONS:  Please refer to your After Visit Summary for your medication list. If you do not have a prescription for a new medication, you may purchase the medication over the counter.   DO NOT TAKE ANY MEDICATIONS THAT ARE NOT ON THIS LIST    INR T[employer]-   INR LEVEL to be drawn-   INR management per    INSTRUCTIONS:  NO SMOKING OR TOBACCO PRODUCTS  Follow all the instructions in your discharge book  You may shower.  Wash all incisions twice daily with mild soap and water.  No lotions, ointments or powder.  Call the office immediately for any redness, swelling, or drainage from your incision.   Take your temperature daily and call for a temperature of 101 degrees or higher or for any symptoms that make you think you have and infection.  Weigh yourself each morning.  Call if you gain more than 5 pounds in 48 hours.  Use the incentive spirometer 6-8 times a day-10 breaths each time.  Use a pillow or your bear to splint your breastbone when  it.    Do not apply topical lotions, ointments, or liquids  You may shower and let soap and water water run on the dressing, but do not scrub it.  Be sure to lightly pat it dry when you exit the shower.  Prineo Dressing Instructions:    Prineo is a lightweight mesh that’s applied over your incision, then coated with a skin adhesive to create a strong, flexible seal that protects against water and bacteria.    Your healthcare team chose to use Dermabond Prineo system on your sternal incision.  Please share the instruction sheet in your discharge packet with your home health nurse.  They will assist with dressing removal 10-14 days after surgery.     Prineo stays on for 10-14 days. If you notice the edge of the dressing peeling up, trim the edge but do not remove the dressing.    Don’t scratch, rub or pick at it.    Do not apply topical lotions, ointments, or liquids  You may shower and let soap and water water run on the dressing, but do not scrub it.  Be sure to lightly pat it dry when you exit the shower.

## 2024-11-15 NOTE — DISCHARGE SUMMARY
Cardiac Surgery Discharge Summary     Patient ID:  Pedro Montes De Oca  [id number]  64 y.o.  1960    Admit date: 11/12/2024    Discharge date: 11/15/2024     Admitting Physician: Gonzales Matson MD     Referring Cardiologist:  Dr. Adan    PCP:  Travis Montez MD    Admitting Diagnoses: CAD    Discharge Diagnoses:     1. CAD in native artery    2. S/P CABG x 3        Discharged Condition: Stable    Disposition: home, see patient instructions for treatment and plan    Procedures for this admission:  Procedure(s):  CORONARY ARTERY BYPASS GRAFTING X 3, RESVGH, LEFT RADIAL ARTERY HARVEST, ECC, VANESSA AND EPIAORTIC U/S BY DR PAINTER    Discharge Medications:      Medication List        START taking these medications      amLODIPine 2.5 MG tablet  Commonly known as: NORVASC  Take 1 tablet by mouth daily     blood glucose test strips  Test 1-2  times a day & as needed for symptoms of irregular blood glucose. Dispense sufficient amount for indicated testing frequency plus additional to accommodate PRN testing needs. Pharmacist to identify preferred brand.     gabapentin 100 MG capsule  Commonly known as: NEURONTIN  Take 2 capsules by mouth 3 times daily for 10 days. Max Daily Amount: 600 mg     glucose monitoring kit  1 kit by Does not apply route daily     HYDROmorphone 2 MG tablet  Commonly known as: DILAUDID  Take 1 tablet by mouth every 4 hours as needed for Pain for up to 5 days. Max Daily Amount: 12 mg     Lancets 30G Misc  Test 1-2 times a day & as needed for symptoms of irregular blood glucose. Dispense sufficient amount for indicated testing frequency plus additional to accommodate PRN testing needs. Pharmacist to identify preferred brand.     polyethylene glycol 17 g packet  Commonly known as: GLYCOLAX  Take 1 packet by mouth daily as needed for Constipation     sennosides-docusate sodium 8.6-50 MG tablet  Commonly known as: SENOKOT-S  Take 1 tablet by mouth daily as needed for Constipation     tamsulosin  [address] [facility] [address] [phone] - F [phone] [address] [facility] [address]      Phone: [phone]   amiodarone 200 MG tablet  amLODIPine 2.5 MG tablet  gabapentin 100 MG capsule  HYDROmorphone 2 MG tablet  tamsulosin 0.4 MG capsule       Information about where to get these medications is not yet available    Ask your nurse or doctor about these medications  acetaminophen 325 MG tablet  polyethylene glycol 17 g packet  sennosides-docusate sodium 8.6-50 MG tablet       HPI: Copied from H&P dated 11/6/24    Pedro Montes De Oca is a 64 y.o. man with a history of prior PCI/JUVENTINO to RCA in 2017, STEMI 2017, CKD?, HLD, HTN, TIA with CPAP, bradycardia on metoprolol  who was referred for cardiac surgery evaluation.  He denies any chest pains or dyspnea.  He had a follow up nuclear stress test due to the MI and stent in 2017.  It was indicative of ischemia. Subsequent cardiac catheterization showed significant coronary artery disease.      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  He is a former smoker of cigars (quit regular use about 25 years ago). He drinks about one or less  alcoholic drink a week.  He walks 8000 steps a day.        Hospital Course: Patient underwent Procedure(s):  CORONARY ARTERY BYPASS GRAFTING X 3, RESVGH, LEFT RADIAL ARTERY HARVEST, ECC, VANESSA AND EPIAORTIC U/S BY DR PAINTER on 11/12/24 with Dr. Matson. Patient was transferred to the ICU in stable condition on Cardene, Fahad, Precedex, Insulin gtts. The patient's post operative course was uncomplicated. The patient was stable and ready for discharge on 11/15/24  with the following plan:    POD 3:   CAD: hx STEMI/JUVENTINO to RCA 2017.  Now s/p CABG with radial artery conduit: asa, statin, BB. Chest tubes out. Keep pacing wires.  Radial artery conduit: Reduced norvasc to 2.5mg to allow BP to tolerate starting BB. Increase to 5mg if tolerates at discharge. Treatment for 6 months to prevent radial artery conduit spasm.   HTN: On CCB and BB  HLD:  atorvastatin 80 Q hs  TIA: CPAP for sleep.   Respiratory insufficiency, post op as expected.  Wean oxygen for Sat >92%. IS, C&DB, ambulate   Anemia, postop as expected: does not meet threshold for transfusion.  Monitor.    Epicardial wires: keep  Afib prophylaxis: amiodarone.  Pt states he tolerated the amio preop w/o nausea.   N&V: post op; discontinue oxycodone and start oral Dilaudid. No more compazine.  Zofran prn.   PT/OT.   Urinary retention: Resolved. On scheduled flomax.     Referral to outpatient cardiac rehab made.     Discharge Vital Signs:   Vitals:    11/15/24 0800   BP: 127/60   Pulse: 67   Resp: 19   Temp: 98.4 °F (36.9 °C)   SpO2: 93%       Labs:   Recent Labs     11/12/24  1207 11/12/24  1744 11/15/24  0825   WBC 16.0*   < > 7.2   HGB 11.1*   < > 9.0*   HCT 32.7*   < > 27.3*      < > 131*      < > 138   K 4.5   < > 3.8   BUN 16   < > 27*   INR 1.2*  --   --     < > = values in this interval not displayed.       Diagnostics: Xray Result (most recent):  XR CHEST STANDARD TWO VW 11/15/2024    Narrative  EXAM: XR CHEST (2 VW)    INDICATION:  s/p chest tube removal    COMPARISON: 11/14/2024    TECHNIQUE: PA and lateral chest views    FINDINGS: Stable postsurgical appearance of the mediastinum and cardiac contour.  The pulmonary vasculature is within normal limits.    Trace left pleural effusion. No pneumothorax. The visualized bones and upper  abdomen are age-appropriate.    Impression  No pneumothorax.  Trace left pleural effusion.      Electronically signed by KAE RUIZ      Patient Instructions/Follow Up Care:  Discharge instructions were reviewed with the patient and family present. Questions were also answered at this time. Prescriptions and medications were reviewed. The patient has a follow up appointments as below. The patient was also instructed to follow up with his primary care physician as needed. The patient and family were encouraged to call with any questions or concerns.

## 2024-11-18 ENCOUNTER — TELEPHONE (OUTPATIENT)
Age: 64
End: 2024-11-18

## 2024-11-18 RX ORDER — AMIODARONE HYDROCHLORIDE 200 MG/1
TABLET ORAL
Qty: 64 TABLET | Refills: 0 | Status: SHIPPED | OUTPATIENT
Start: 2024-11-18 | End: 2024-12-16

## 2024-11-18 NOTE — PROGRESS NOTES
Cardiac Surgery Discharge - Follow up call placed to Pedro Montes De Oca. Reviewed plan of care after discharge and encouraged Pedro Montes De Oca to verbalize. Discussed precautions and reviewed medications, they stated that they did not receive enough tablets for the Amiodarone. CS NP will call in the remaining tablets to [facility]. Encouraged continued use of the incentive spirometer.  Confirmed follow up appts and reinforced importance of wearing red reminder bracelet. Pedro Montes De Oca is without questions or concerns.

## 2024-11-19 ENCOUNTER — TELEPHONE (OUTPATIENT)
Age: 64
End: 2024-11-19

## 2024-11-20 ENCOUNTER — OFFICE VISIT (OUTPATIENT)
Age: 64
End: 2024-11-20

## 2024-11-20 DIAGNOSIS — Z95.1 S/P CABG X 3: Primary | ICD-10-CM

## 2024-11-20 PROCEDURE — 99024 POSTOP FOLLOW-UP VISIT: CPT | Performed by: THORACIC SURGERY (CARDIOTHORACIC VASCULAR SURGERY)

## 2024-11-20 NOTE — PROGRESS NOTES
Patient: Pedro Montes De Oca   Age: 64 y.o.     Patient Care Team:  Travis Montez MD as PCP - General  Travis Montez MD as PCP - Empaneled Provider  Porfirio Adan DO as Consulting Physician (Cardiology)  Gonzales Matson MD as Consulting Physician (Cardiothoracic Surgery)    Diagnosis: The encounter diagnosis was S/P CABG x 3.    Problem List:   Patient Active Problem List   Diagnosis    STEMI (ST elevation myocardial infarction) (HCC)    Coronary artery disease involving native coronary artery    Abnormal stress test    Disease of cardiovascular system    CAD in native artery    S/P CABG x 3    New onset type 2 diabetes mellitus (HCC)    Transient hyperglycemia post procedure    Type 2 diabetes mellitus with hyperglycemia, without long-term current use of insulin (HCC)        This service was provided thru telehealth, between Martha UPTON RN at Cardiac Surgery Specialist's office and Pedro THEO Tavon  at their home.     Date of Surgery: 2024     Surgery: CORONARY ARTERY BYPASS GRAFTING X 3, RESVGH, LEFT RADIAL ARTERY HARVEST, ECC, VANESSA AND EPIAORTIC U/S BY DR PAINTER     HPI: The patient has no complaints at this time.  He reports. The patient denies fever, sternal clicking/popping, and incisional drainage/openings. They deny weight loss/gain, angina, dizziness, SOB, palpitations, and LE edema. The patient reports they are moving around their house well and performing ADLs independently without issue. The patient is eating well, voiding without issue, and having regular bowel movements. The patient reports they are sleeping without issue. Reminded to call cardiac rehab and cardiology for follow up.    Reported home data:  Weight: 214  Temp: 97.2  B    Current Medications:   Current Outpatient Medications   Medication Sig Dispense Refill    amiodarone (CORDARONE) 200 MG tablet Take 2 tablets by mouth 2 times daily for 14 days, THEN 2 tablets daily for 14 days. 64 tablet 0    acetaminophen

## 2024-11-22 ENCOUNTER — TELEPHONE (OUTPATIENT)
Age: 64
End: 2024-11-22

## 2024-11-22 NOTE — TELEPHONE ENCOUNTER
Patient had a triple by bass requested a sooner appointment after December 22     Requested to call   to it be scheduled he is in a lot of pain     [facility]  [phone]

## 2024-11-22 NOTE — PROGRESS NOTES
Physician Progress Note      PATIENT:               JULITA HEADLEY  CSN #:                  [id number]  :                       1960  ADMIT DATE:       2024 5:14 AM  DISCH DATE:        11/15/2024 5:22 PM  RESPONDING  PROVIDER #:        Karlee Bui NP          QUERY TEXT:    Pt admitted with CAD and has anemia documented. If possible, please document   in progress notes and discharge summary further specificity regarding the   acuity and type of anemia:    The medical record reflects the following:  Risk Factors: 64 y.o. male with STEMI s/p PCI, CAD s/p CABG, HTN, HLD    Clinical Indicators:  11/15 DCS  \"Anemia, postop as expected: does not meet threshold for transfusion.    Monitor.\"     Brief Op-Note  \"Estimated Blood Loss:  200 ml\"    H/H Lab results -11/15  Hgb: 11.1-10.2-9.3-8.4-9.0  Hct: 32.7-30.0-27.7-25.5-27.3    Treatment: CVI level of care, CBC monitoring    Thank you,  Eleanor Urias, BSN, RN, CCRN, CRCR  Options provided:  -- Anemia due to acute blood loss  -- Anemia due to postoperative blood loss  -- Dilutional anemia  -- Other - I will add my own diagnosis  -- Disagree - Not applicable / Not valid  -- Disagree - Clinically unable to determine / Unknown  -- Refer to Clinical Documentation Reviewer    PROVIDER RESPONSE TEXT:    This patient has acute blood loss anemia.    Query created by: Eleanor Urias on 2024 12:19 PM      Electronically signed by:  Karlee Bui NP 2024 9:20 AM

## 2024-11-25 ENCOUNTER — TELEPHONE (OUTPATIENT)
Age: 64
End: 2024-11-25

## 2024-11-27 ENCOUNTER — TELEPHONE (OUTPATIENT)
Age: 64
End: 2024-11-27

## 2024-12-03 ENCOUNTER — HOSPITAL ENCOUNTER (OUTPATIENT)
Facility: HOSPITAL | Age: 64
Setting detail: RECURRING SERIES
Discharge: HOME | End: 2024-12-06
Attending: THORACIC SURGERY (CARDIOTHORACIC VASCULAR SURGERY)
Payer: COMMERCIAL

## 2024-12-03 VITALS — BODY MASS INDEX: 30.03 KG/M2 | HEIGHT: 70 IN | WEIGHT: 209.8 LBS

## 2024-12-03 PROCEDURE — 93798 PHYS/QHP OP CAR RHAB W/ECG: CPT

## 2024-12-03 PROCEDURE — 93797 PHYS/QHP OP CAR RHAB WO ECG: CPT

## 2024-12-03 ASSESSMENT — EXERCISE STRESS TEST
PEAK_RPE: 9
PEAK_BP: 130/61
PEAK_HR: 65
PEAK_METS: 2.2

## 2024-12-03 ASSESSMENT — PATIENT HEALTH QUESTIONNAIRE - PHQ9
9. THOUGHTS THAT YOU WOULD BE BETTER OFF DEAD, OR OF HURTING YOURSELF: NOT AT ALL
1. LITTLE INTEREST OR PLEASURE IN DOING THINGS: NOT AT ALL
SUM OF ALL RESPONSES TO PHQ QUESTIONS 1-9: 2
SUM OF ALL RESPONSES TO PHQ QUESTIONS 1-9: 2
8. MOVING OR SPEAKING SO SLOWLY THAT OTHER PEOPLE COULD HAVE NOTICED. OR THE OPPOSITE, BEING SO FIGETY OR RESTLESS THAT YOU HAVE BEEN MOVING AROUND A LOT MORE THAN USUAL: NOT AT ALL
10. IF YOU CHECKED OFF ANY PROBLEMS, HOW DIFFICULT HAVE THESE PROBLEMS MADE IT FOR YOU TO DO YOUR WORK, TAKE CARE OF THINGS AT HOME, OR GET ALONG WITH OTHER PEOPLE: NOT DIFFICULT AT ALL
SUM OF ALL RESPONSES TO PHQ9 QUESTIONS 1 & 2: 0
6. FEELING BAD ABOUT YOURSELF - OR THAT YOU ARE A FAILURE OR HAVE LET YOURSELF OR YOUR FAMILY DOWN: NOT AT ALL
7. TROUBLE CONCENTRATING ON THINGS, SUCH AS READING THE NEWSPAPER OR WATCHING TELEVISION: NOT AT ALL
SUM OF ALL RESPONSES TO PHQ QUESTIONS 1-9: 2
5. POOR APPETITE OR OVEREATING: SEVERAL DAYS
4. FEELING TIRED OR HAVING LITTLE ENERGY: SEVERAL DAYS
SUM OF ALL RESPONSES TO PHQ QUESTIONS 1-9: 2
3. TROUBLE FALLING OR STAYING ASLEEP: NOT AT ALL
2. FEELING DOWN, DEPRESSED OR HOPELESS: NOT AT ALL

## 2024-12-03 NOTE — CARDIO/PULMONARY
INTAKE APPOINTMENT NOTE  12/3/2024    NAME: Pedro Montes De Oca : 1960 AGE: 64 y.o.  GENDER: male    CARDIAC REHAB ADMITTING DIAGNOSIS: Postsurgical aortocoronary bypass status [Z95.1]    REFERRING PHYSICIAN: Gonzales Matson MD    MEDICAL HX:  Past Medical History:   Diagnosis Date    Chronic kidney disease 10/09/2024    creat 1.39-1.40    Coronary artery disease involving native coronary artery 10/02/2017    STEMI    Diabetes mellitus (HCC) 2024    hGB a1C 6.5    HTN (hypertension)     Hyperlipidemia     Sleep apnea treated with continuous positive airway pressure (CPAP)        LABS:     Hgb A1c       6.5     2024  Lab Results   Component Value Date/Time    CHOL 97 10/18/2024 09:44 AM    HDL 31 10/18/2024 09:44 AM    LDL 42 10/18/2024 09:44 AM    LDL 61 2023 02:51 PM    VLDL 24 10/18/2024 09:44 AM    VLDL 18 2023 02:51 PM         ANTHROPOMETRICS:      Ht Readings from Last 1 Encounters:   24 1.778 m (5' 10\")      Wt Readings from Last 1 Encounters:   24 95.2 kg (209 lb 12.8 oz)        WAIST: 46.5 in       VISIT SUMMARY:    Pedro Montes De Oca 64 y.o. presented to Cardiac Rehab for program orientation and 6 minute walk test today with a primary diagnosis of Postsurgical aortocoronary bypass status [Z95.1] on 24. EF is 55 % (24) Cardiac risk factors include: DM, HTN, HLD, previous MI/stent, obesity, age, gender & family hx. Pt reported he smokes a couple of cigars per year when socializing with old friends.    Pedro Montes De Oca resides in [place] with his wife. They have raised one son, who lives in [place], and 2 daughters, who live locally. Family is supportive. Patient was evaluated for depression using the PHQ-9 assessment tool with a result of 2 which is considered a low score.The result was discussed with patient, who affirmed results to be accurate. He would like to be able to return to work as a  in [place] soon.     Patient

## 2024-12-05 ENCOUNTER — HOSPITAL ENCOUNTER (OUTPATIENT)
Facility: HOSPITAL | Age: 64
Setting detail: RECURRING SERIES
Discharge: HOME | End: 2024-12-08
Attending: THORACIC SURGERY (CARDIOTHORACIC VASCULAR SURGERY)
Payer: COMMERCIAL

## 2024-12-05 VITALS — BODY MASS INDEX: 29.96 KG/M2 | WEIGHT: 208.8 LBS

## 2024-12-05 PROCEDURE — 93798 PHYS/QHP OP CAR RHAB W/ECG: CPT

## 2024-12-05 ASSESSMENT — EXERCISE STRESS TEST
PEAK_METS: 2.2
PEAK_HR: 69

## 2024-12-10 ENCOUNTER — HOSPITAL ENCOUNTER (OUTPATIENT)
Facility: HOSPITAL | Age: 64
Setting detail: RECURRING SERIES
Discharge: HOME | End: 2024-12-13
Attending: THORACIC SURGERY (CARDIOTHORACIC VASCULAR SURGERY)
Payer: COMMERCIAL

## 2024-12-10 VITALS — BODY MASS INDEX: 30.02 KG/M2 | WEIGHT: 209.2 LBS

## 2024-12-10 PROCEDURE — 93798 PHYS/QHP OP CAR RHAB W/ECG: CPT

## 2024-12-10 ASSESSMENT — EXERCISE STRESS TEST
PEAK_HR: 83
PEAK_RPE: 10
PEAK_METS: 2.2

## 2024-12-12 ENCOUNTER — HOSPITAL ENCOUNTER (OUTPATIENT)
Facility: HOSPITAL | Age: 64
Setting detail: RECURRING SERIES
Discharge: HOME | End: 2024-12-15
Attending: THORACIC SURGERY (CARDIOTHORACIC VASCULAR SURGERY)
Payer: COMMERCIAL

## 2024-12-12 VITALS — WEIGHT: 208 LBS | BODY MASS INDEX: 29.84 KG/M2

## 2024-12-12 PROCEDURE — 93798 PHYS/QHP OP CAR RHAB W/ECG: CPT

## 2024-12-12 ASSESSMENT — EXERCISE STRESS TEST
PEAK_METS: 2.6
PEAK_HR: 72
PEAK_RPE: 11

## 2024-12-16 ENCOUNTER — OFFICE VISIT (OUTPATIENT)
Age: 64
End: 2024-12-16

## 2024-12-16 ENCOUNTER — HOSPITAL ENCOUNTER (OUTPATIENT)
Facility: HOSPITAL | Age: 64
Setting detail: RECURRING SERIES
Discharge: HOME | End: 2024-12-19
Attending: THORACIC SURGERY (CARDIOTHORACIC VASCULAR SURGERY)
Payer: COMMERCIAL

## 2024-12-16 VITALS — WEIGHT: 208.8 LBS | BODY MASS INDEX: 29.96 KG/M2

## 2024-12-16 VITALS
TEMPERATURE: 97.8 F | WEIGHT: 209 LBS | DIASTOLIC BLOOD PRESSURE: 76 MMHG | SYSTOLIC BLOOD PRESSURE: 149 MMHG | HEART RATE: 60 BPM | OXYGEN SATURATION: 99 % | BODY MASS INDEX: 29.99 KG/M2

## 2024-12-16 DIAGNOSIS — Z95.1 S/P CABG (CORONARY ARTERY BYPASS GRAFT): Primary | ICD-10-CM

## 2024-12-16 PROCEDURE — 93798 PHYS/QHP OP CAR RHAB W/ECG: CPT

## 2024-12-16 PROCEDURE — 99024 POSTOP FOLLOW-UP VISIT: CPT | Performed by: THORACIC SURGERY (CARDIOTHORACIC VASCULAR SURGERY)

## 2024-12-16 RX ORDER — METOPROLOL SUCCINATE 25 MG/1
25 TABLET, EXTENDED RELEASE ORAL DAILY
Qty: 30 TABLET | Refills: 2 | Status: SHIPPED | OUTPATIENT
Start: 2024-12-16

## 2024-12-16 RX ORDER — TAMSULOSIN HYDROCHLORIDE 0.4 MG/1
0.4 CAPSULE ORAL NIGHTLY
Qty: 30 CAPSULE | Refills: 0 | Status: SHIPPED | OUTPATIENT
Start: 2024-12-16

## 2024-12-16 RX ORDER — AMLODIPINE BESYLATE 2.5 MG/1
2.5 TABLET ORAL DAILY
Qty: 30 TABLET | Refills: 4 | Status: SHIPPED | OUTPATIENT
Start: 2024-12-16

## 2024-12-16 ASSESSMENT — EXERCISE STRESS TEST
PEAK_METS: 2.6
PEAK_HR: 82
PEAK_RPE: 11

## 2024-12-16 NOTE — PROGRESS NOTES
Patient: Pedro Montes De Oca   Age: 64 y.o.     Patient Care Team:  Travis Montez MD as PCP - General  Travis Montez MD as PCP - Empaneled Provider  Porfirio Adan DO as Consulting Physician (Cardiology)  Gonzales Matson MD as Consulting Physician (Cardiothoracic Surgery)    Diagnosis: s/p CABG    Problem List:   Patient Active Problem List   Diagnosis    STEMI (ST elevation myocardial infarction) (HCC)    Coronary artery disease involving native coronary artery    Abnormal stress test    Disease of cardiovascular system    CAD in native artery    S/P CABG x 3    New onset type 2 diabetes mellitus (HCC)    Transient hyperglycemia post procedure    Type 2 diabetes mellitus with hyperglycemia, without long-term current use of insulin (HCC)        Date of Surgery: 2024      Surgery: CORONARY ARTERY BYPASS GRAFTING X 3, RESVGH, LEFT RADIAL ARTERY HARVEST, ECC, VANESSA AND EPIAORTIC U/S BY DR PAINTER     HPI:  Pt is here for post op follow up, seen with Dr. Matson. Pt has no complaints. The patient denies fever, sternal clicking/popping, and incisional drainage/openings. They deny weight loss/gain, angina, dizziness, SOB, palpitations, and LE edema. The patient reports they are moving around their house well and performing ADLs independently without issue. The patient is eating well, voiding without issue, and having regular bowel movements. The patient reports they are sleeping without issue. He has started cardiac rehab and would like to go back to work.    Reported home data:  Weight: 204 lb now, was 205 preop  Bs - 110s    Current Medications:   Current Outpatient Medications   Medication Sig Dispense Refill    amiodarone (CORDARONE) 200 MG tablet Take 2 tablets by mouth 2 times daily for 14 days, THEN 2 tablets daily for 14 days. 64 tablet 0    acetaminophen (TYLENOL) 325 MG tablet Take 3 tablets by mouth every 6 hours as needed for Pain      amLODIPine (NORVASC) 2.5 MG tablet Take 1

## 2024-12-19 ENCOUNTER — HOSPITAL ENCOUNTER (OUTPATIENT)
Facility: HOSPITAL | Age: 64
Setting detail: RECURRING SERIES
Discharge: HOME | End: 2024-12-22
Attending: THORACIC SURGERY (CARDIOTHORACIC VASCULAR SURGERY)
Payer: COMMERCIAL

## 2024-12-19 VITALS — WEIGHT: 207.4 LBS | BODY MASS INDEX: 29.76 KG/M2

## 2024-12-19 PROCEDURE — 93798 PHYS/QHP OP CAR RHAB W/ECG: CPT

## 2024-12-19 ASSESSMENT — EXERCISE STRESS TEST
PEAK_RPE: 11
PEAK_METS: 2.6
PEAK_HR: 76

## 2024-12-20 ENCOUNTER — HOSPITAL ENCOUNTER (OUTPATIENT)
Facility: HOSPITAL | Age: 64
Setting detail: RECURRING SERIES
Discharge: HOME | End: 2024-12-23
Attending: THORACIC SURGERY (CARDIOTHORACIC VASCULAR SURGERY)
Payer: COMMERCIAL

## 2024-12-20 VITALS — WEIGHT: 207.5 LBS | BODY MASS INDEX: 29.77 KG/M2

## 2024-12-20 PROCEDURE — 93798 PHYS/QHP OP CAR RHAB W/ECG: CPT

## 2024-12-20 ASSESSMENT — EXERCISE STRESS TEST
PEAK_RPE: 11
PEAK_METS: 2.6
PEAK_HR: 77

## 2024-12-27 ENCOUNTER — HOSPITAL ENCOUNTER (OUTPATIENT)
Facility: HOSPITAL | Age: 64
Setting detail: RECURRING SERIES
Discharge: HOME | End: 2024-12-30
Attending: THORACIC SURGERY (CARDIOTHORACIC VASCULAR SURGERY)
Payer: COMMERCIAL

## 2024-12-27 VITALS — BODY MASS INDEX: 29.47 KG/M2 | WEIGHT: 205.4 LBS

## 2024-12-27 PROCEDURE — 93798 PHYS/QHP OP CAR RHAB W/ECG: CPT

## 2024-12-27 ASSESSMENT — EXERCISE STRESS TEST
PEAK_HR: 74
PEAK_METS: 3
PEAK_RPE: 11

## 2024-12-30 ENCOUNTER — HOSPITAL ENCOUNTER (OUTPATIENT)
Facility: HOSPITAL | Age: 64
Setting detail: RECURRING SERIES
Discharge: HOME | End: 2025-01-02
Attending: THORACIC SURGERY (CARDIOTHORACIC VASCULAR SURGERY)
Payer: COMMERCIAL

## 2024-12-30 VITALS — WEIGHT: 205 LBS | BODY MASS INDEX: 29.41 KG/M2

## 2024-12-30 PROCEDURE — 93798 PHYS/QHP OP CAR RHAB W/ECG: CPT

## 2024-12-30 ASSESSMENT — EXERCISE STRESS TEST
PEAK_METS: 3
PEAK_HR: 87
PEAK_RPE: 11

## 2025-01-02 ENCOUNTER — HOSPITAL ENCOUNTER (OUTPATIENT)
Facility: HOSPITAL | Age: 65
Setting detail: RECURRING SERIES
Discharge: HOME | End: 2025-01-05
Attending: THORACIC SURGERY (CARDIOTHORACIC VASCULAR SURGERY)
Payer: COMMERCIAL

## 2025-01-02 VITALS — BODY MASS INDEX: 29.24 KG/M2 | WEIGHT: 203.8 LBS

## 2025-01-02 PROCEDURE — 93798 PHYS/QHP OP CAR RHAB W/ECG: CPT

## 2025-01-02 ASSESSMENT — EXERCISE STRESS TEST
PEAK_METS: 3
PEAK_HR: 87
PEAK_RPE: 11

## 2025-01-03 ENCOUNTER — HOSPITAL ENCOUNTER (OUTPATIENT)
Facility: HOSPITAL | Age: 65
Setting detail: RECURRING SERIES
Discharge: HOME | End: 2025-01-06
Attending: THORACIC SURGERY (CARDIOTHORACIC VASCULAR SURGERY)
Payer: COMMERCIAL

## 2025-01-03 VITALS — BODY MASS INDEX: 29.27 KG/M2 | WEIGHT: 204 LBS

## 2025-01-03 PROCEDURE — 93798 PHYS/QHP OP CAR RHAB W/ECG: CPT

## 2025-01-03 ASSESSMENT — EXERCISE STRESS TEST
PEAK_RPE: 11
PEAK_METS: 3.2
PEAK_HR: 94

## 2025-01-06 ENCOUNTER — APPOINTMENT (OUTPATIENT)
Facility: HOSPITAL | Age: 65
End: 2025-01-06
Attending: THORACIC SURGERY (CARDIOTHORACIC VASCULAR SURGERY)
Payer: COMMERCIAL

## 2025-01-07 ENCOUNTER — HOSPITAL ENCOUNTER (OUTPATIENT)
Facility: HOSPITAL | Age: 65
Setting detail: RECURRING SERIES
Discharge: HOME | End: 2025-01-10
Attending: THORACIC SURGERY (CARDIOTHORACIC VASCULAR SURGERY)
Payer: COMMERCIAL

## 2025-01-07 VITALS — BODY MASS INDEX: 29.41 KG/M2 | WEIGHT: 205 LBS

## 2025-01-07 PROCEDURE — 93798 PHYS/QHP OP CAR RHAB W/ECG: CPT

## 2025-01-07 ASSESSMENT — EXERCISE STRESS TEST
PEAK_HR: 96
PEAK_METS: 3
PEAK_RPE: 11

## 2025-01-09 ENCOUNTER — OFFICE VISIT (OUTPATIENT)
Age: 65
End: 2025-01-09
Payer: COMMERCIAL

## 2025-01-09 VITALS
BODY MASS INDEX: 29.63 KG/M2 | OXYGEN SATURATION: 98 % | SYSTOLIC BLOOD PRESSURE: 124 MMHG | DIASTOLIC BLOOD PRESSURE: 78 MMHG | WEIGHT: 207 LBS | HEIGHT: 70 IN | HEART RATE: 62 BPM

## 2025-01-09 DIAGNOSIS — R94.39 ABNORMAL STRESS TEST: ICD-10-CM

## 2025-01-09 DIAGNOSIS — I10 ESSENTIAL (PRIMARY) HYPERTENSION: ICD-10-CM

## 2025-01-09 DIAGNOSIS — I25.10 CAD IN NATIVE ARTERY: Primary | ICD-10-CM

## 2025-01-09 PROCEDURE — 3074F SYST BP LT 130 MM HG: CPT | Performed by: INTERNAL MEDICINE

## 2025-01-09 PROCEDURE — 3078F DIAST BP <80 MM HG: CPT | Performed by: INTERNAL MEDICINE

## 2025-01-09 PROCEDURE — 99214 OFFICE O/P EST MOD 30 MIN: CPT | Performed by: INTERNAL MEDICINE

## 2025-01-09 NOTE — PROGRESS NOTES
Office Follow-up    NAME: Pedro Montes De Oca   :  1960  MRM:  [id number]    Date:  25         Plan:     CAD status post ST elevation MI in the inferior walls, RCA stent (2017); LIMA to LAD, Radial graft to OM1, SVG to RCA 24. Undergoing cardiac rehab. No chest pain. Continue ASA, Statins, Zetia. Will check NMR lipids with Lp a and Apo B, CMP.   Hypertension: Continue dietary restriction of sodium to less than 2 g/day.  BP today is normal. Home morning BP at times is 160. Asked him to take BP 30-60min after waking. Continue metoprolol and Amlodipine. Will resume Lisinopril after CMP.   Dyslipidemia: Last labs from 2023 reviewed. Has low HDL. Continue Lipitor and Zetia. Will check NMR lipids with apo b and Lp a.   Bradycardia: Monitor. On Metoprolol   TIA- Uses CPAP   See me again in 3 months with Echo.    He is a  at [employer].     ATTENTION:   This medical record was transcribed using an electronic medical records/speech recognition system.  Although proofread, it may and can contain electronic, spelling and other errors.  Corrections may be executed at a later time.  Please feel free to contact us for any clarifications as needed.         Subjective:     No chest pain, SOB, palpitations, or dizziness. He is s/p CABG on 2024.     ----------------  Pedro Montes De Oca, a 63 y.o. year-old who presents for followup.  He has known history of CAD, stimulation MI, diffuse CAD, moderate disease of the LAD and left main.  Background history of hypertension dyslipidemia.  He is here for follow-up.  Denies any symptoms of chest pain, shortness of breath, lightheadedness or dizziness.  Remains active and exercises.  Has regained some of his weight that he had lost last year.      Exam:     Physical Exam:  Visit Vitals  /78 (Site: Left Upper Arm, Position: Sitting)   Pulse 62   Ht 1.778 m (5' 10\")   Wt 93.9 kg (207 lb)   SpO2 98%   BMI 29.70 kg/m²       General

## 2025-01-09 NOTE — PROGRESS NOTES
POWER received from Dr. COLLINS Phillips MD:  CMP, NMR lipids with Apo B and Lp(a)   pt given lab orders  See Dr. Phillips in 3 months with same day Echo for CAD, CABG.

## 2025-01-09 NOTE — PATIENT INSTRUCTIONS
CMP, NMR lipids with Apo B and Lp(a)    See Dr. Phillips in 3 months with same day Echo for CAD, CABG.

## 2025-01-09 NOTE — PROGRESS NOTES
Chief Complaint   Patient presents with    Chest Pain    Hypertension    Coronary Artery Disease     Vitals:    01/09/25 0853   BP: 124/78   Site: Left Upper Arm   Position: Sitting   Pulse: 62   SpO2: 98%   Weight: 93.9 kg (207 lb)   Height: 1.778 m (5' 10\")       Chest pain NO     ER, urgent care, or hospitalized outside of [facility] since your last visit?  NO     Refills NO

## 2025-01-10 ENCOUNTER — HOSPITAL ENCOUNTER (OUTPATIENT)
Facility: HOSPITAL | Age: 65
Setting detail: RECURRING SERIES
Discharge: HOME | End: 2025-01-13
Attending: THORACIC SURGERY (CARDIOTHORACIC VASCULAR SURGERY)
Payer: COMMERCIAL

## 2025-01-10 VITALS — WEIGHT: 203.6 LBS | BODY MASS INDEX: 29.21 KG/M2

## 2025-01-10 PROCEDURE — 93798 PHYS/QHP OP CAR RHAB W/ECG: CPT

## 2025-01-10 ASSESSMENT — EXERCISE STRESS TEST
PEAK_HR: 95
PEAK_METS: 3.4
PEAK_RPE: 11

## 2025-01-13 ENCOUNTER — HOSPITAL ENCOUNTER (OUTPATIENT)
Facility: HOSPITAL | Age: 65
Setting detail: RECURRING SERIES
Discharge: HOME | End: 2025-01-16
Attending: THORACIC SURGERY (CARDIOTHORACIC VASCULAR SURGERY)
Payer: COMMERCIAL

## 2025-01-13 VITALS — WEIGHT: 206.2 LBS | BODY MASS INDEX: 29.59 KG/M2

## 2025-01-13 PROCEDURE — 93798 PHYS/QHP OP CAR RHAB W/ECG: CPT

## 2025-01-13 RX ORDER — METOPROLOL TARTRATE 25 MG/1
TABLET, FILM COATED ORAL
Qty: 90 TABLET | Refills: 4 | OUTPATIENT
Start: 2025-01-13

## 2025-01-13 RX ORDER — ISOSORBIDE MONONITRATE 30 MG/1
30 TABLET, EXTENDED RELEASE ORAL DAILY
Qty: 90 TABLET | Refills: 4 | OUTPATIENT
Start: 2025-01-13

## 2025-01-13 RX ORDER — ATORVASTATIN CALCIUM 80 MG/1
80 TABLET, FILM COATED ORAL NIGHTLY
Qty: 90 TABLET | Refills: 1 | Status: SHIPPED | OUTPATIENT
Start: 2025-01-13

## 2025-01-13 ASSESSMENT — EXERCISE STRESS TEST
PEAK_HR: 94
PEAK_RPE: 11
PEAK_METS: 3.4

## 2025-01-14 ENCOUNTER — HOSPITAL ENCOUNTER (OUTPATIENT)
Facility: HOSPITAL | Age: 65
Setting detail: RECURRING SERIES
Discharge: HOME | End: 2025-01-17
Attending: THORACIC SURGERY (CARDIOTHORACIC VASCULAR SURGERY)
Payer: COMMERCIAL

## 2025-01-14 VITALS — WEIGHT: 206 LBS | BODY MASS INDEX: 29.56 KG/M2

## 2025-01-14 PROCEDURE — 93798 PHYS/QHP OP CAR RHAB W/ECG: CPT

## 2025-01-14 ASSESSMENT — EXERCISE STRESS TEST
PEAK_HR: 92
PEAK_RPE: 11
PEAK_METS: 3.4

## 2025-01-16 ENCOUNTER — HOSPITAL ENCOUNTER (OUTPATIENT)
Facility: HOSPITAL | Age: 65
Setting detail: RECURRING SERIES
Discharge: HOME | End: 2025-01-19
Attending: THORACIC SURGERY (CARDIOTHORACIC VASCULAR SURGERY)
Payer: COMMERCIAL

## 2025-01-16 VITALS — WEIGHT: 204 LBS | BODY MASS INDEX: 29.27 KG/M2

## 2025-01-16 PROCEDURE — 93798 PHYS/QHP OP CAR RHAB W/ECG: CPT

## 2025-01-16 ASSESSMENT — EXERCISE STRESS TEST
PEAK_HR: 114
PEAK_METS: 3.4
PEAK_RPE: 13

## 2025-01-21 ENCOUNTER — HOSPITAL ENCOUNTER (OUTPATIENT)
Facility: HOSPITAL | Age: 65
Setting detail: RECURRING SERIES
Discharge: HOME | End: 2025-01-24
Attending: THORACIC SURGERY (CARDIOTHORACIC VASCULAR SURGERY)
Payer: COMMERCIAL

## 2025-01-21 VITALS — BODY MASS INDEX: 29.56 KG/M2 | WEIGHT: 206 LBS

## 2025-01-21 PROCEDURE — 93798 PHYS/QHP OP CAR RHAB W/ECG: CPT

## 2025-01-21 ASSESSMENT — EXERCISE STRESS TEST
PEAK_RPE: 13
PEAK_HR: 101
PEAK_METS: 3.4

## 2025-01-23 ENCOUNTER — HOSPITAL ENCOUNTER (OUTPATIENT)
Facility: HOSPITAL | Age: 65
Setting detail: RECURRING SERIES
Discharge: HOME | End: 2025-01-26
Attending: THORACIC SURGERY (CARDIOTHORACIC VASCULAR SURGERY)
Payer: COMMERCIAL

## 2025-01-23 VITALS — WEIGHT: 203.4 LBS | BODY MASS INDEX: 29.18 KG/M2

## 2025-01-23 PROCEDURE — 93798 PHYS/QHP OP CAR RHAB W/ECG: CPT

## 2025-01-23 ASSESSMENT — EXERCISE STRESS TEST
PEAK_HR: 110
PEAK_RPE: 13
PEAK_METS: 3.4

## 2025-01-27 ENCOUNTER — HOSPITAL ENCOUNTER (OUTPATIENT)
Facility: HOSPITAL | Age: 65
Setting detail: RECURRING SERIES
Discharge: HOME | End: 2025-01-30
Attending: THORACIC SURGERY (CARDIOTHORACIC VASCULAR SURGERY)
Payer: COMMERCIAL

## 2025-01-27 VITALS — BODY MASS INDEX: 29.59 KG/M2 | WEIGHT: 206.2 LBS

## 2025-01-27 PROCEDURE — 93798 PHYS/QHP OP CAR RHAB W/ECG: CPT

## 2025-01-27 ASSESSMENT — EXERCISE STRESS TEST
PEAK_HR: 106
PEAK_RPE: 13
PEAK_METS: 3.4

## 2025-01-28 ENCOUNTER — HOSPITAL ENCOUNTER (OUTPATIENT)
Facility: HOSPITAL | Age: 65
Setting detail: RECURRING SERIES
Discharge: HOME | End: 2025-01-31
Attending: THORACIC SURGERY (CARDIOTHORACIC VASCULAR SURGERY)
Payer: COMMERCIAL

## 2025-01-28 VITALS — WEIGHT: 206.4 LBS | BODY MASS INDEX: 29.62 KG/M2

## 2025-01-28 PROCEDURE — 93798 PHYS/QHP OP CAR RHAB W/ECG: CPT

## 2025-01-28 ASSESSMENT — PATIENT HEALTH QUESTIONNAIRE - PHQ9
1. LITTLE INTEREST OR PLEASURE IN DOING THINGS: NOT AT ALL
10. IF YOU CHECKED OFF ANY PROBLEMS, HOW DIFFICULT HAVE THESE PROBLEMS MADE IT FOR YOU TO DO YOUR WORK, TAKE CARE OF THINGS AT HOME, OR GET ALONG WITH OTHER PEOPLE: NOT DIFFICULT AT ALL
3. TROUBLE FALLING OR STAYING ASLEEP: NOT AT ALL
SUM OF ALL RESPONSES TO PHQ QUESTIONS 1-9: 0
5. POOR APPETITE OR OVEREATING: NOT AT ALL
SUM OF ALL RESPONSES TO PHQ QUESTIONS 1-9: 0
8. MOVING OR SPEAKING SO SLOWLY THAT OTHER PEOPLE COULD HAVE NOTICED. OR THE OPPOSITE, BEING SO FIGETY OR RESTLESS THAT YOU HAVE BEEN MOVING AROUND A LOT MORE THAN USUAL: NOT AT ALL
SUM OF ALL RESPONSES TO PHQ9 QUESTIONS 1 & 2: 0
SUM OF ALL RESPONSES TO PHQ QUESTIONS 1-9: 0
6. FEELING BAD ABOUT YOURSELF - OR THAT YOU ARE A FAILURE OR HAVE LET YOURSELF OR YOUR FAMILY DOWN: NOT AT ALL
9. THOUGHTS THAT YOU WOULD BE BETTER OFF DEAD, OR OF HURTING YOURSELF: NOT AT ALL
4. FEELING TIRED OR HAVING LITTLE ENERGY: NOT AT ALL
2. FEELING DOWN, DEPRESSED OR HOPELESS: NOT AT ALL
7. TROUBLE CONCENTRATING ON THINGS, SUCH AS READING THE NEWSPAPER OR WATCHING TELEVISION: NOT AT ALL
SUM OF ALL RESPONSES TO PHQ QUESTIONS 1-9: 0

## 2025-01-28 ASSESSMENT — EXERCISE STRESS TEST
PEAK_HR: 111
PEAK_METS: 3.7
PEAK_RPE: 13

## 2025-01-30 ENCOUNTER — HOSPITAL ENCOUNTER (OUTPATIENT)
Facility: HOSPITAL | Age: 65
Setting detail: RECURRING SERIES
End: 2025-01-30
Attending: THORACIC SURGERY (CARDIOTHORACIC VASCULAR SURGERY)
Payer: COMMERCIAL

## 2025-01-30 VITALS — WEIGHT: 206.4 LBS | BODY MASS INDEX: 29.62 KG/M2

## 2025-01-30 PROCEDURE — 93798 PHYS/QHP OP CAR RHAB W/ECG: CPT

## 2025-01-30 ASSESSMENT — EXERCISE STRESS TEST
PEAK_RPE: 13
PEAK_HR: 109
PEAK_METS: 3.7

## 2025-01-30 NOTE — CARDIO/PULMONARY
DISCHARGE SUMMARY NOTE  2025      NAME: Pedro Montes De Oca : 1960 AGE: 64 y.o.  GENDER: male    CARDIAC REHAB ADMITTING DIAGNOSIS: Postsurgical aortocoronary bypass status [Z95.1]    REFERRING PHYSICIAN: Gonzales Matson MD    MEDICAL HX:  Past Medical History:   Diagnosis Date    Chronic kidney disease 10/09/2024    creat 1.39-1.40    Coronary artery disease involving native coronary artery 10/02/2017    STEMI    Diabetes mellitus (HCC) 2024    hGB a1C 6.5    HTN (hypertension)     Hyperlipidemia     Sleep apnea treated with continuous positive airway pressure (CPAP)        LABS:     No results found for: \"HBA1C\", \"[id number]\"  Lab Results   Component Value Date/Time    CHOL 97 10/18/2024 09:44 AM    HDL 31 10/18/2024 09:44 AM    LDL 42 10/18/2024 09:44 AM    LDL 61 2023 02:51 PM    VLDL 24 10/18/2024 09:44 AM    VLDL 18 2023 02:51 PM         ANTHROPOMETRICS:      Ht Readings from Last 1 Encounters:   25 1.778 m (5' 10\")      Wt Readings from Last 1 Encounters:   25 93.6 kg (206 lb 6.4 oz)        WAIST:           PROGRAM SUMMARY:    Pedro Montes De Oca completed 22/36 sessions in the Cardiac Rehab program. He will continue exercising 5-6 x week and focus on diet and nutrition at home. He attended 1 classes and is aware of his cardiac risk factors and cardiac medications. Weight loss was 3 lbs. MET level increase from 2.2 to 3.1.       Questions addressed. Discharge plans discussed. Pedro Montes De Oca verbalized understanding.      JUSTINO GUPTA RN

## 2025-02-12 RX ORDER — EZETIMIBE 10 MG/1
10 TABLET ORAL DAILY
Qty: 90 TABLET | Refills: 3 | Status: SHIPPED | OUTPATIENT
Start: 2025-02-12

## 2025-02-12 NOTE — TELEPHONE ENCOUNTER
Refill per VO of Dr. TYE Adan, OD:    Last appt:  1/9/2025    Future Appointments   Date Time Provider Department Center   5/5/2025  2:30 PM [facility] ECHO 3 [facility] BS AMB   5/5/2025  3:40 PM Stef Phillips MD [facility] BS AMB   6/4/2025  8:50 AM [facility]       Requested Prescriptions     Pending Prescriptions Disp Refills    ezetimibe (ZETIA) 10 MG tablet [Pharmacy Med Name: EZETIMIBE 10 MG TABLET] 30 tablet 3     Sig: TAKE 1 TABLET BY MOUTH EVERY DAY

## 2025-03-17 RX ORDER — METOPROLOL SUCCINATE 25 MG/1
25 TABLET, EXTENDED RELEASE ORAL DAILY
Qty: 30 TABLET | Refills: 2 | OUTPATIENT
Start: 2025-03-17

## 2025-04-04 ENCOUNTER — TELEPHONE (OUTPATIENT)
Age: 65
End: 2025-04-04

## 2025-04-04 RX ORDER — METOPROLOL SUCCINATE 25 MG/1
25 TABLET, EXTENDED RELEASE ORAL DAILY
Qty: 30 TABLET | Refills: 2 | OUTPATIENT
Start: 2025-04-04

## 2025-04-04 RX ORDER — METOPROLOL SUCCINATE 25 MG/1
25 TABLET, EXTENDED RELEASE ORAL DAILY
Qty: 90 TABLET | Refills: 3 | Status: SHIPPED | OUTPATIENT
Start: 2025-04-04

## 2025-04-04 NOTE — TELEPHONE ENCOUNTER
Patient is requesting a medication refill on medication Metoprolol 25 mg. Pharmacy confirmed.     [facility] ph# [phone]    Patient phone # [phone]

## 2025-04-04 NOTE — TELEPHONE ENCOUNTER
Refill per VO of Dr. Stef Phillips:    1/9/2025    Future Appointments   Date Time Provider Department Center   [facility]   5/5/2025  3:40 PM Stef Phillips MD [facility] BS AMB   6/4/2025  8:50 AM [facility]       Requested Prescriptions      No prescriptions requested or ordered in this encounter

## 2025-04-11 ENCOUNTER — TELEPHONE (OUTPATIENT)
Age: 65
End: 2025-04-11

## 2025-04-12 LAB
ALBUMIN SERPL-MCNC: 4.5 G/DL (ref 3.9–4.9)
ALP SERPL-CCNC: 139 IU/L (ref 44–121)
ALT SERPL-CCNC: 18 IU/L (ref 0–44)
APO B SERPL-MCNC: 48 MG/DL
AST SERPL-CCNC: 16 IU/L (ref 0–40)
BILIRUB SERPL-MCNC: 0.6 MG/DL (ref 0–1.2)
BUN SERPL-MCNC: 15 MG/DL (ref 8–27)
BUN/CREAT SERPL: 11 (ref 10–24)
CALCIUM SERPL-MCNC: 9.1 MG/DL (ref 8.6–10.2)
CHLORIDE SERPL-SCNC: 104 MMOL/L (ref 96–106)
CHOLEST SERPL-MCNC: 97 MG/DL (ref 100–199)
CO2 SERPL-SCNC: 22 MMOL/L (ref 20–29)
CREAT SERPL-MCNC: 1.33 MG/DL (ref 0.76–1.27)
EGFRCR SERPLBLD CKD-EPI 2021: 60 ML/MIN/1.73
GLOBULIN SER CALC-MCNC: 2 G/DL (ref 1.5–4.5)
GLUCOSE SERPL-MCNC: 89 MG/DL (ref 70–99)
HDL SERPL-SCNC: 25.1 UMOL/L
HDLC SERPL-MCNC: 41 MG/DL
LDL SERPL QN: 19.9 NM
LDL SERPL-SCNC: 538 NMOL/L
LDL SMALL SERPL-SCNC: 411 NMOL/L
LDLC SERPL CALC-MCNC: 39 MG/DL (ref 0–99)
POTASSIUM SERPL-SCNC: 4.1 MMOL/L (ref 3.5–5.2)
PROT SERPL-MCNC: 6.5 G/DL (ref 6–8.5)
SODIUM SERPL-SCNC: 143 MMOL/L (ref 134–144)
TRIGL SERPL-MCNC: 81 MG/DL (ref 0–149)

## 2025-04-15 ENCOUNTER — RESULTS FOLLOW-UP (OUTPATIENT)
Age: 65
End: 2025-04-15

## 2025-04-15 LAB — LPA SERPL-SCNC: 134.3 NMOL/L

## 2025-04-16 ENCOUNTER — TELEPHONE (OUTPATIENT)
Age: 65
End: 2025-04-16

## 2025-04-16 NOTE — RESULT ENCOUNTER NOTE
Pls notify>>   Despite high dose statins his Lpa is high at 134.     Would like to start him on Repatha if approved by insurance. If repatha started then will reduce the statin dose.

## 2025-04-16 NOTE — TELEPHONE ENCOUNTER
Patient is returning call back to the nurse to answer her questions that were sent by [employer].    [phone]

## 2025-04-16 NOTE — TELEPHONE ENCOUNTER
Patient is returning call back to the nurse to answer her questions that were sent by [employer].    [phone]    ##################################    Spoke with the pt, identified the pt with name and . Pt unsure about starting Repatha, he would like to wait until after his appointment with Alan in May to discuss it. I explained the process of self injection, but he's still not completely convinced. The pt is willing to have me send it to the pharmacy and start the process, but won't pick it up until he talks to Dr. Welch. I agreed.    Refill per VO of Dr. Elton Welch:    2025    Future Appointments   Date Time Provider Department Center   2025  2:30 PM [facility] ECHO 3 [facility] BS AMB   2025  3:40 PM Elton Welch MD [facility]   2025  8:50 AM Maria G Cruz, APRN - NP [facility]       Requested Prescriptions     Signed Prescriptions Disp Refills    Evolocumab 140 MG/ML SOAJ 2 Adjustable Dose Pre-filled Pen Syringe 12     Sig: Inject 1 mL into the skin every 14 days     Authorizing Provider: ELTON WELCH     Ordering User: MARTITA MA

## 2025-04-17 ENCOUNTER — CLINICAL DOCUMENTATION (OUTPATIENT)
Age: 65
End: 2025-04-17

## 2025-05-05 ENCOUNTER — OFFICE VISIT (OUTPATIENT)
Age: 65
End: 2025-05-05
Payer: COMMERCIAL

## 2025-05-05 VITALS
OXYGEN SATURATION: 97 % | WEIGHT: 197 LBS | BODY MASS INDEX: 28.2 KG/M2 | DIASTOLIC BLOOD PRESSURE: 80 MMHG | HEIGHT: 70 IN | HEART RATE: 60 BPM | SYSTOLIC BLOOD PRESSURE: 138 MMHG

## 2025-05-05 DIAGNOSIS — E78.5 HYPERLIPIDEMIA, UNSPECIFIED HYPERLIPIDEMIA TYPE: ICD-10-CM

## 2025-05-05 DIAGNOSIS — Z95.1 S/P CABG X 3: ICD-10-CM

## 2025-05-05 DIAGNOSIS — I10 ESSENTIAL (PRIMARY) HYPERTENSION: ICD-10-CM

## 2025-05-05 DIAGNOSIS — I25.83 CORONARY ARTERY DISEASE DUE TO LIPID RICH PLAQUE: ICD-10-CM

## 2025-05-05 DIAGNOSIS — I25.10 CAD IN NATIVE ARTERY: Primary | ICD-10-CM

## 2025-05-05 DIAGNOSIS — I25.10 CORONARY ARTERY DISEASE DUE TO LIPID RICH PLAQUE: ICD-10-CM

## 2025-05-05 PROCEDURE — 3075F SYST BP GE 130 - 139MM HG: CPT | Performed by: INTERNAL MEDICINE

## 2025-05-05 PROCEDURE — 99214 OFFICE O/P EST MOD 30 MIN: CPT | Performed by: INTERNAL MEDICINE

## 2025-05-05 PROCEDURE — 3079F DIAST BP 80-89 MM HG: CPT | Performed by: INTERNAL MEDICINE

## 2025-05-05 RX ORDER — ATORVASTATIN CALCIUM 80 MG/1
40 TABLET, FILM COATED ORAL NIGHTLY
Qty: 45 TABLET | Refills: 3 | Status: SHIPPED | OUTPATIENT
Start: 2025-05-05

## 2025-05-05 RX ORDER — LISINOPRIL 5 MG/1
5 TABLET ORAL DAILY
Qty: 90 TABLET | Refills: 3 | Status: SHIPPED | OUTPATIENT
Start: 2025-05-05

## 2025-05-05 NOTE — PATIENT INSTRUCTIONS
Lisinopril 5mg po daily.     Change Atorvastatin to HALF tablet of 80mg po daily.     Recheck NMR lipids with Lp(a) in 3 months.

## 2025-05-05 NOTE — PROGRESS NOTES
Office Follow-up    NAME: Pedro Montes De Oca   :  1960  MRM:  [id number]    Date:  25         Plan:     CAD status post ST elevation MI in the inferior walls, RCA stent (2017); LIMA to LAD, Radial graft to OM1, SVG to RCA 24. Asymptomatic. Good energy level. Continue ASA, Toprol XL, Start Repatha due to Lpa high. Reduce Atorvastatin to 40mg po daily.    Hypertension: Continue dietary restriction of sodium to less than 2 g/day.  Continue Toprol XL and Amlodipine. Resume Lisinopril at lower dose at 5mg for renoprotection.   Dyslipidemia: As above. Check NMR lipids with Lpa in 3 months.   Bradycardia: Monitor. On Metoprolol   TIA- Uses CPAP   See me again in 6 months.     He is a  at [employer].     ATTENTION:   This medical record was transcribed using an electronic medical records/speech recognition system.  Although proofread, it may and can contain electronic, spelling and other errors.  Corrections may be executed at a later time.  Please feel free to contact us for any clarifications as needed.         Subjective:     No chest pain, SOB, palpitations, or dizziness. He is s/p CABG on 2024.     ----------------  Pedro Montes De Oca, a 63 y.o. year-old who presents for followup.  He has known history of CAD, stimulation MI, diffuse CAD, moderate disease of the LAD and left main.  Background history of hypertension dyslipidemia.  He is here for follow-up.  Denies any symptoms of chest pain, shortness of breath, lightheadedness or dizziness.  Remains active and exercises.  Has regained some of his weight that he had lost last year.      Exam:     Physical Exam:  Visit Vitals  /80 (BP Site: Left Upper Arm, Patient Position: Sitting, BP Cuff Size: Medium Adult)   Pulse 60   Ht 1.778 m (5' 10\")   Wt 89.4 kg (197 lb)   SpO2 97%   BMI 28.27 kg/m²       General appearance - alert, well appearing, and in no distress  Mental status - affect appropriate to mood  Eyes -

## 2025-05-05 NOTE — PROGRESS NOTES
Pedro Montes De Oca is a 65 y.o. male    had concerns including Coronary Artery Disease and Hypertension.    Vitals:    05/05/25 1504   BP: 138/80   BP Site: Left Upper Arm   Patient Position: Sitting   BP Cuff Size: Medium Adult   Pulse: 60   SpO2: 97%   Weight: 89.4 kg (197 lb)   Height: 1.778 m (5' 10\")        Chest pain No        1. Have you been to the ER, urgent care clinic since your last visit?  Hospitalized since your last visit? no    2. Have you seen or consulted any other health care providers outside of the [facility] since your last visit?  Include any pap smears or colon screening. no

## 2025-05-05 NOTE — PROGRESS NOTES
Orders for Lisinopril 5mg po daily.     Change Atorvastatin to HALF tablet of 80mg po daily.     Recheck NMR lipids with Lp(a) in 3 months.   per Dr. Phillips's VO.

## 2025-05-14 RX ORDER — AMLODIPINE BESYLATE 2.5 MG/1
2.5 TABLET ORAL DAILY
Qty: 30 TABLET | Refills: 4 | OUTPATIENT
Start: 2025-05-14

## 2025-05-28 RX ORDER — AMLODIPINE BESYLATE 2.5 MG/1
2.5 TABLET ORAL DAILY
Qty: 30 TABLET | Refills: 4 | OUTPATIENT
Start: 2025-05-28

## 2025-06-03 ASSESSMENT — SLEEP AND FATIGUE QUESTIONNAIRES
HOW LIKELY ARE YOU TO NOD OFF OR FALL ASLEEP WHILE SITTING AND TALKING TO SOMEONE: WOULD NEVER DOZE
HOW LIKELY ARE YOU TO NOD OFF OR FALL ASLEEP WHILE SITTING INACTIVE IN A PUBLIC PLACE: SLIGHT CHANCE OF DOZING
DO YOU HAVE DIFFICULTY OPERATING A MOTOR VEHICLE FOR SHORT DISTANCES (LESS THAN 100 MILES) BECAUSE YOU BECOME SLEEPY: NO
HOW LIKELY ARE YOU TO NOD OFF OR FALL ASLEEP WHILE SITTING AND READING: SLIGHT CHANCE OF DOZING
ESS TOTAL SCORE: 4
HOW LIKELY ARE YOU TO NOD OFF OR FALL ASLEEP IN A CAR, WHILE STOPPED FOR A FEW MINUTES IN TRAFFIC: WOULD NEVER DOZE
FOSQ SCORE: 20
HOW LIKELY ARE YOU TO NOD OFF OR FALL ASLEEP WHILE SITTING INACTIVE IN A PUBLIC PLACE: SLIGHT CHANCE OF DOZING
HOW LIKELY ARE YOU TO NOD OFF OR FALL ASLEEP WHILE SITTING QUIETLY AFTER LUNCH WITHOUT ALCOHOL: WOULD NEVER DOZE
DO YOU HAVE DIFFICULTY WATCHING A MOVIE OR VIDEO BECAUSE YOU BECOME SLEEPY OR TIRED: NO
HOW LIKELY ARE YOU TO NOD OFF OR FALL ASLEEP WHILE SITTING AND TALKING TO SOMEONE: WOULD NEVER DOZE
DO YOU HAVE DIFFICULTY BEING AS ACTIVE AS YOU WANT TO BE IN THE EVENING BECAUSE YOU ARE SLEEPY OR TIRED: NO
HOW LIKELY ARE YOU TO NOD OFF OR FALL ASLEEP WHILE LYING DOWN TO REST IN THE AFTERNOON WHEN CIRCUMSTANCES PERMIT: SLIGHT CHANCE OF DOZING
HOW LIKELY ARE YOU TO NOD OFF OR FALL ASLEEP WHILE SITTING QUIETLY AFTER LUNCH WITHOUT ALCOHOL: WOULD NEVER DOZE
DO YOU HAVE DIFFICULTY VISITING YOUR FAMILY OR FRIENDS IN THEIR HOME BECAUSE YOU BECOME SLEEPY OR TIRED: NO
HOW LIKELY ARE YOU TO NOD OFF OR FALL ASLEEP IN A CAR, WHILE STOPPED FOR A FEW MINUTES IN TRAFFIC: WOULD NEVER DOZE
HOW LIKELY ARE YOU TO NOD OFF OR FALL ASLEEP WHEN YOU ARE A PASSENGER IN A CAR FOR AN HOUR WITHOUT A BREAK: WOULD NEVER DOZE
HOW LIKELY ARE YOU TO NOD OFF OR FALL ASLEEP WHILE SITTING AND READING: SLIGHT CHANCE OF DOZING
DO YOU HAVE DIFFICULTY OPERATING A MOTOR VEHICLE FOR LONG DISTANCES (GREATER THAN 100 MILES) BECAUSE YOU BECOME SLEEPY: NO
HAS YOUR RELATIONSHIP WITH FAMILY, FRIENDS OR WORK COLLEAGUES BEEN AFFECTED BECAUSE YOU ARE SLEEPY OR TIRED: NO
DO YOU HAVE DIFFICULTY CONCENTRATING ON THE THINGS YOU DO BECAUSE YOU ARE SLEEPY OR TIRED: NO
DO YOU GENERALLY HAVE DIFFICULTY REMEMBERING THINGS BECAUSE YOU ARE SLEEPY OR TIRED: NO
DO YOU HAVE DIFFICULTY BEING AS ACTIVE AS YOU WANT TO BE IN THE MORNING BECAUSE YOU ARE SLEEPY OR TIRED: NO
HOW LIKELY ARE YOU TO NOD OFF OR FALL ASLEEP WHILE LYING DOWN TO REST IN THE AFTERNOON WHEN CIRCUMSTANCES PERMIT: SLIGHT CHANCE OF DOZING
HOW LIKELY ARE YOU TO NOD OFF OR FALL ASLEEP WHILE WATCHING TV: SLIGHT CHANCE OF DOZING
HAS YOUR MOOD BEEN AFFECTED BECAUSE YOU ARE SLEEPY OR TIRED: NO
HOW LIKELY ARE YOU TO NOD OFF OR FALL ASLEEP WHEN YOU ARE A PASSENGER IN A CAR FOR AN HOUR WITHOUT A BREAK: WOULD NEVER DOZE
HOW LIKELY ARE YOU TO NOD OFF OR FALL ASLEEP WHILE WATCHING TV: SLIGHT CHANCE OF DOZING

## 2025-06-03 NOTE — PROGRESS NOTES
[address]   [place] [address]   Tel.  [phone]   Fax. [phone]  [address]   [place] [address]   Tel.  [phone]   Fax. [phone] [address] [place] [address]   Tel.  [phone]   Fax. [phone]     Pedro Montes De Oca (: 1960) is a 64 y.o. male, established patient, seen for positive airway pressure follow-up and evaluation.  He was last seen by Maria Guadalupe Soto NP on 2024, previously seen by Dr. Vijay Núñez on 3/27/2014, prior notes and sleep testing reviewed in detail.  Home sleep test 2014 showed AHI of 59/hr with a lowest SpO2 of 76%.  A  subsequent titration study 2014 showed events responding to CPAP 14 cmH2O. Weight at time of sleep testing 180 pounds.     ASSESSMENT/PLAN:   Diagnosis Orders   1. Obstructive sleep apnea (adult) (pediatric)  DME Order for (Specify) as OP      2. Essential (primary) hypertension        3. BMI 28.0-28.9,adult          AHI = 59(2014).  On CPAP, Resmed :  14 cmH2O. Set up 4/3/2023.     He is adherent with PAP therapy and PAP continues to benefit patient and remains necessary for control of his sleep apnea.     Sleep Apnea - Continue on current pressure settings. He had a triple bypass in 2024. He is doing well. He has issues with his supplier related to incorrect supplies. Updated prescription placed.     Orders Placed This Encounter   Procedures    DME Order for (Specify) as OP     Diagnosis: (G47.33) TIA (obstructive sleep apnea)  (primary encounter diagnosis)     Replacement Supplies for Positive Airway Pressure Therapy Device:   Duration of need: 99 months.      Nasal Pillows Combo Mask (Replace) 2 per month.   Nasal Pillows (Replace) 2 per month.    Nasal Cushion (Replace) 2 per month.   Nasal Interface Mask 1 every 3 months.    DREAMWEAR SIZE MEDIUM       Headgear 1 every 6 months.   Positive Airway Pressure chinstrap 1 every 6

## 2025-06-04 ENCOUNTER — OFFICE VISIT (OUTPATIENT)
Age: 65
End: 2025-06-04
Payer: COMMERCIAL

## 2025-06-04 VITALS
HEART RATE: 59 BPM | BODY MASS INDEX: 28.77 KG/M2 | DIASTOLIC BLOOD PRESSURE: 81 MMHG | OXYGEN SATURATION: 96 % | HEIGHT: 70 IN | SYSTOLIC BLOOD PRESSURE: 139 MMHG | TEMPERATURE: 97.3 F | WEIGHT: 201 LBS

## 2025-06-04 DIAGNOSIS — G47.33 OBSTRUCTIVE SLEEP APNEA (ADULT) (PEDIATRIC): Primary | ICD-10-CM

## 2025-06-04 DIAGNOSIS — I10 ESSENTIAL (PRIMARY) HYPERTENSION: ICD-10-CM

## 2025-06-04 PROCEDURE — 99214 OFFICE O/P EST MOD 30 MIN: CPT | Performed by: NURSE PRACTITIONER

## 2025-06-04 NOTE — PATIENT INSTRUCTIONS
[address]  [place] [address]  Tel.  [phone]  Fax. [phone] [address]  [place] [address]  Tel.  [phone]  Fax. [phone] [address] [place] [address]  Tel.  [phone]  Fax. [phone]     Learning About CPAP for Sleep Apnea  What is CPAP?              CPAP is a small machine that you use at home every night while you sleep. It increases air pressure in your throat to keep your airway open. When you have sleep apnea, this can help you sleep better so you feel much better. CPAP stands for \"continuous positive airway pressure.\"  The CPAP machine will have one of the following:  A mask that covers your nose and mouth  Prongs that fit into your nose  A mask that covers your nose only, the most common type. This type is called NCPAP. The N stands for \"nasal.\"  Why is it done?  CPAP is usually the best treatment for obstructive sleep apnea. It is the first treatment choice and the most widely used. Your doctor may suggest CPAP if you have:  Moderate to severe sleep apnea.  Sleep apnea and coronary artery disease (CAD) or heart failure.  How does it help?  CPAP can help you have more normal sleep, so you feel less sleepy and more alert during the daytime.  CPAP may help keep heart failure or other heart problems from getting worse.  NCPAP may help lower your blood pressure.  If you use CPAP, your bed partner may also sleep better because you are not snoring or restless.  What are the side effects?  Some people who use CPAP have:  A dry or stuffy nose and a sore throat.  Irritated skin on the face.  Sore eyes.  Bloating.  If you have any of these problems, work with your doctor to fix them. Here are some things you can try:  Be sure the mask or nasal prongs fit well.  See if your doctor can adjust the pressure of your CPAP.  If your nose is dry, try a humidifier.  If your nose is runny or stuffy, try decongestant medicine or a steroid

## 2025-06-09 ENCOUNTER — CLINICAL DOCUMENTATION (OUTPATIENT)
Age: 65
End: 2025-06-09

## 2025-06-16 RX ORDER — AMLODIPINE BESYLATE 2.5 MG/1
2.5 TABLET ORAL DAILY
Qty: 30 TABLET | Refills: 4 | OUTPATIENT
Start: 2025-06-16

## 2025-07-01 ENCOUNTER — TELEPHONE (OUTPATIENT)
Age: 65
End: 2025-07-01

## 2025-07-01 RX ORDER — AMLODIPINE BESYLATE 2.5 MG/1
2.5 TABLET ORAL DAILY
Qty: 60 TABLET | Refills: 3 | Status: SHIPPED | OUTPATIENT
Start: 2025-07-01

## 2025-07-01 NOTE — TELEPHONE ENCOUNTER
Patient called for authorization refill of amlodipine. Pharmacy needs this. Thanks         Valery with the [facility] called for cardiac clearance for a dental implant placement & to stop his plavix. She will also send a fax too. Thanks    Valery # [phone]  Fax # [phone]

## 2025-07-01 NOTE — TELEPHONE ENCOUNTER
Refill per VO of Dr. Stef Phillips:    5/5/2025    Future Appointments   Date Time Provider Department Center   11/5/2025  2:40 PM Stef Phillips MD [facility] BS AMB   6/3/2026  9:30 AM [facility]       Requested Prescriptions     Pending Prescriptions Disp Refills    amLODIPine (NORVASC) 2.5 MG tablet 60 tablet 3     Sig: Take 1 tablet by mouth daily

## 2025-07-02 NOTE — TELEPHONE ENCOUNTER
Patient returning your call. Can be reached at [phone]. States that he will be available after 12 today.     #################################    Spoke with the pt, identified the pt with name and . He reports he is not on Plavix at this time. Just the ASA.

## 2025-07-02 NOTE — TELEPHONE ENCOUNTER
Patient returning your call. Can be reached at [phone]. States that he will be available after 12 today.

## 2025-07-03 ENCOUNTER — CLINICAL DOCUMENTATION (OUTPATIENT)
Age: 65
End: 2025-07-03

## 2025-07-03 NOTE — PROGRESS NOTES
Received VO from Mahi Thao ANP:    Pt Low cardiac risk, may proceed with procedure.(Per Revised Cardiac Risk Index (Erich Criteria) )   re: holding anticoagulation for procedure.     May hold ASA for 7 days prior.   Restart anticoagulation per surgeon, as soon as possible.     Risk stratification and Medication hold faxed to [facility] @ [phone], dental implants

## 2025-08-02 LAB
ALBUMIN SERPL-MCNC: 4.4 G/DL (ref 3.9–4.9)
ALP SERPL-CCNC: 133 IU/L (ref 44–121)
ALT SERPL-CCNC: 22 IU/L (ref 0–44)
APO A-I SERPL-MCNC: 108 MG/DL (ref 101–178)
APO B SERPL-MCNC: 22 MG/DL
AST SERPL-CCNC: 18 IU/L (ref 0–40)
BILIRUB SERPL-MCNC: 0.6 MG/DL (ref 0–1.2)
BUN SERPL-MCNC: 20 MG/DL (ref 8–27)
BUN/CREAT SERPL: 16 (ref 10–24)
CALCIUM SERPL-MCNC: 9.6 MG/DL (ref 8.6–10.2)
CHLORIDE SERPL-SCNC: 103 MMOL/L (ref 96–106)
CO2 SERPL-SCNC: 23 MMOL/L (ref 20–29)
CREAT SERPL-MCNC: 1.23 MG/DL (ref 0.76–1.27)
EGFRCR SERPLBLD CKD-EPI 2021: 66 ML/MIN/1.73
GLOBULIN SER CALC-MCNC: 2.1 G/DL (ref 1.5–4.5)
GLUCOSE SERPL-MCNC: 122 MG/DL (ref 70–99)
POTASSIUM SERPL-SCNC: 4.6 MMOL/L (ref 3.5–5.2)
PROT SERPL-MCNC: 6.5 G/DL (ref 6–8.5)
SODIUM SERPL-SCNC: 141 MMOL/L (ref 134–144)

## 2025-08-31 ENCOUNTER — TELEPHONE (OUTPATIENT)
Facility: HOSPITAL | Age: 65
End: 2025-08-31

## (undated) DEVICE — PROVE COVER: Brand: UNBRANDED

## (undated) DEVICE — TEMP PACING WIRE: Brand: MYO/WIRE

## (undated) DEVICE — CONNECTOR DRNGE 3/8 1/2X3/16X3/16IN BASE L5MM ARM L10-13MM

## (undated) DEVICE — BLADE,CARBON-STEEL,15,STRL,DISPOSABLE,TB: Brand: MEDLINE

## (undated) DEVICE — RADIFOCUS OPTITORQUE ANGIOGRAPHIC CATHETER: Brand: OPTITORQUE

## (undated) DEVICE — ARGO BAGZ FLUID MANAGEMENT SYSTEM: Brand: ARGO BAGZ FLUID MANAGEMENT SYSTEM

## (undated) DEVICE — 6 FOOT DISPOSABLE EXTENSION CABLE WITH SAFE CONNECT / SCREW-DOWN

## (undated) DEVICE — GUIDEWIRE VASC L180CM DIA0.035IN 3MM PTFE J TIP EXCHG FIX

## (undated) DEVICE — FOGARTY - HYDRAGRIP SURGICAL - CLAMP INSERTS: Brand: FOGARTY SOFTJAW

## (undated) DEVICE — SEALANT TISS 10 ML FIBRIN VISTASEAL

## (undated) DEVICE — BLADE OPHTH W1.5MM 15DEG ORNG HNDL SHRP SHRP DEL FOR CATRCT

## (undated) DEVICE — CANNULA PERF L12IN DIA20FR GWIRE DIA0.038IN ART ELONG 1 PC

## (undated) DEVICE — DRAIN,WOUND,ROUND,24FR,5/16",FULL-FLUTED: Brand: MEDLINE

## (undated) DEVICE — CONTAINER,SPECIMEN,3OZ,OR STRL: Brand: MEDLINE

## (undated) DEVICE — DRAIN SURG SGL COLL PT TB FOR ATS BG OASIS

## (undated) DEVICE — BANDAGE,ELASTIC,ESMARK,STERILE,4"X9',LF: Brand: MEDLINE

## (undated) DEVICE — SUTURE PERMA-HAND 0 L18IN NONABSORBABLE BLK CT-1 L36MM 1/2 C021D

## (undated) DEVICE — VALVE IV REFLX W/ M/F LUER LCK UNIV CAP STRL DISP

## (undated) DEVICE — SUTURE PROL SZ 3-0 L30IN NONABSORBABLE BLU SH-1 L22MM 1/2 8762H

## (undated) DEVICE — WRAP SURG W1.31XL1.34M CARD FOR PT 165-172CM THERMOWRP

## (undated) DEVICE — SOLUTION IV 500ML 0.9% SOD CHL PH 5 INJ USP VIAFLX PLAS

## (undated) DEVICE — COPILOT BLEEDBACK CONTROL VALVE: Brand: COPILOT

## (undated) DEVICE — TIDISHIELD TRANSPORT, CONTAINMENT COVER FOR BACK TABLE 4'6" (1.37M) TO 8' (2.43M) IN LENGTH: Brand: TIDISHIELD

## (undated) DEVICE — 40418 TRENDELENBURG ONE-STEP ARM PROTECTORS LARGE (1 PAIR): Brand: 40418 TRENDELENBURG ONE-STEP ARM PROTECTORS LARGE (1 PAIR)

## (undated) DEVICE — DEVICE INFL 20ML 30ATM DGT FLD DISPNS SYR W ACCESSPLUS BLU

## (undated) DEVICE — VALVE ANGIO ID0.11IN HEMSTAT MTL GUID WIRE INSRT TOOL AND

## (undated) DEVICE — SUTURE NONABSORBABLE MONOFILAMENT 7-0 BV-1 1X24 IN PROLENE 8702H

## (undated) DEVICE — KENDALL DL ECG DUAL CONNECT RADIOLUCENT LEAD WIRES, 5-LEAD, SINGLE PATIENT USE: Brand: KENDALL

## (undated) DEVICE — AVID DUAL STAGE VENOUS DRAINAGE CANNULA: Brand: AVID DUAL STAGE VENOUS DRAINAGE CANNULA

## (undated) DEVICE — GLIDESHEATH SLENDER STAINLESS STEEL KIT: Brand: GLIDESHEATH SLENDER

## (undated) DEVICE — PRESSURE MONITORING SET: Brand: TRUWAVE

## (undated) DEVICE — CATHETER GUID 6FR L100CM DIA0.071IN NYL SHFT EBU3.5

## (undated) DEVICE — KIT BLWR MISTER 5P 15L W/ TBNG SET IRRIG MIST TO IMPROVE

## (undated) DEVICE — SUTURE VICRYL + SZ 0 L36IN ABSRB VLT L40MM CT 1/2 CIR TAPR VCP358H

## (undated) DEVICE — LIQUIBAND RAPID ADHESIVE 36/CS 0.8ML: Brand: MEDLINE

## (undated) DEVICE — X-RAY DETECTABLE SPONGES,16 PLY: Brand: VISTEC

## (undated) DEVICE — GLOVE SURG SZ 8 CRM LTX FREE POLYISOPRENE POLYMER BEAD ANTI

## (undated) DEVICE — TBG INSUFFLATION LUER LOCK: Brand: MEDLINE INDUSTRIES, INC.

## (undated) DEVICE — SYRINGE MEDICAL 3ML CLEAR PLASTIC STANDARD NON CONTROL LUERLOCK TIP DISPOSABLE

## (undated) DEVICE — TUBING PRSS MON L6IN PVC M FEM CONN

## (undated) DEVICE — ABSORBABLE COLLAGEN HEMOSTATIC SPONGE, 3IN X 4IN, 5MM THICK: Brand: HELISTAT ® ABSORBABLE COLLAGEN HEMOSTATIC SPONGE

## (undated) DEVICE — SUTURE PROL SZ 6-0 L24IN NONABSORBABLE BLU L9.3MM BV-1 3/8 8805H

## (undated) DEVICE — KIT,ANTI FOG,W/SPONGE & FLUID,SOFT PACK: Brand: MEDLINE

## (undated) DEVICE — TR BAND RADIAL ARTERY COMPRESSION DEVICE: Brand: TR BAND

## (undated) DEVICE — TRANSFER BAG 300 ML: Brand: HAEMONETICS

## (undated) DEVICE — GOWN,SIRUS,NONRNF,SETINSLV,XL,20/CS: Brand: MEDLINE

## (undated) DEVICE — SYSTEM ENDOSCP VES HARV W/ TOOL CANN SEAL SHT PRT BLNT TIP

## (undated) DEVICE — SYRINGE MED 10ML LUERLOCK TIP W/O SFTY DISP

## (undated) DEVICE — 1000ML,PRESSURE INFUSER W/STOPCOCK: Brand: MEDLINE

## (undated) DEVICE — BANDAGE COMPR M W6INXL10YD WHT BGE VELC E MTRX HK AND LOOP

## (undated) DEVICE — SUTURE PROL SZ 4-0 L30IN NONABSORBABLE BLU SH-1 L22MM 1/2 8526H

## (undated) DEVICE — 3M™ TEGADERM™ TRANSPARENT FILM DRESSING FRAME STYLE, 1626W, 4 IN X 4-3/4 IN (10 CM X 12 CM), 50/CT 4CT/CASE: Brand: 3M™ TEGADERM™

## (undated) DEVICE — COVER,LIGHT,CAMERA,HARD,1/PK,STRL: Brand: MEDLINE

## (undated) DEVICE — TUBING PRSS MON L12IN PVC RIG NONEXPANDING M TO FEM CONN

## (undated) DEVICE — SOLUTION IV 1000ML PH 7.4 INJ NRMSOL R

## (undated) DEVICE — SYRINGE MED 50ML LUERLOCK TIP

## (undated) DEVICE — BANDAGE COMPR ELASTIC 5 YDX6 IN

## (undated) DEVICE — CATHETER DIAG 5FR L100CM LUMN ID0.047IN JL3.5 CRV 0 SIDE H

## (undated) DEVICE — Device: Brand: EAGLE EYE PLATINUM RX DIGITAL IVUS CATHETER

## (undated) DEVICE — SUPPORT WRST AD W3.5XL9IN DIA14.5IN ART SFT ADJ HK AND LOOP

## (undated) DEVICE — Device: Brand: CLEANCUT ROTATING AORTIC PUNCH

## (undated) DEVICE — RUNTHROUGH NS EXTRA FLOPPY PTCA GUIDEWIRE: Brand: RUNTHROUGH

## (undated) DEVICE — LEAD PACE L475MM CHNL A OR V MYOCARDIAL STEROID ELUT SIL

## (undated) DEVICE — OPEN HEART A- RICHMOND: Brand: MEDLINE INDUSTRIES, INC.

## (undated) DEVICE — SUTURE S STL SZ 6 L18IN NONABSORBABLE SIL L48MM V-40 1/2 M649G

## (undated) DEVICE — Device: Brand: JELCO

## (undated) DEVICE — SUTURE PROL SZ 8-0 L24IN NONABSORBABLE BLU L6.5MM BV130-5 8732H

## (undated) DEVICE — SOLUTION IV 1000ML 140MEQ/L SOD 5MEQ/L K 3MEQ/L MG 27MEQ/L

## (undated) DEVICE — SOLUTION IV 1000ML 0.9% SOD CHL PH 5 INJ USP VIAFLX PLAS

## (undated) DEVICE — ADHESIVE SKIN CLOSURE XL 42 CM 2.7 CC MESH LIQUIBAND SECUR

## (undated) DEVICE — MEDI-TRACE CADENCE ADULT, DEFIBRILLATION ELECTRODE -RTS  (10 PR/PK) - PHYSIO-CONTROL: Brand: MEDI-TRACE CADENCE

## (undated) DEVICE — SUTURE MONOCRYL + SZ 3-0 L27IN ABSRB UD PS1 L24MM 3/8 CIR REV MCP936H

## (undated) DEVICE — PLEDGET SUT SFT OVL 3 8X5 16IN

## (undated) DEVICE — DRESSING FOAM W8.7XL9.1IN SAFETAC LAYR SELF ADH MEPILEX

## (undated) DEVICE — Device: Brand: ASAHI SION BLUE

## (undated) DEVICE — FOGARTY SPRING CLIPS 6MM: Brand: FOGARTY SOFTJAW

## (undated) DEVICE — HEART CATH-SFMC: Brand: MEDLINE INDUSTRIES, INC.

## (undated) DEVICE — OPEN HEART B-RICHMOND: Brand: MEDLINE INDUSTRIES, INC.

## (undated) DEVICE — SUTURE PROL SZ 7-0 L24IN NONABSORBABLE BLU L8MM BV175-6 3/8 8735H